# Patient Record
Sex: FEMALE | Race: BLACK OR AFRICAN AMERICAN | NOT HISPANIC OR LATINO | Employment: FULL TIME | URBAN - METROPOLITAN AREA
[De-identification: names, ages, dates, MRNs, and addresses within clinical notes are randomized per-mention and may not be internally consistent; named-entity substitution may affect disease eponyms.]

---

## 2024-03-03 ENCOUNTER — APPOINTMENT (OUTPATIENT)
Dept: RADIOLOGY | Facility: HOSPITAL | Age: 34
End: 2024-03-03
Payer: MEDICAID

## 2024-03-03 ENCOUNTER — HOSPITAL ENCOUNTER (INPATIENT)
Facility: HOSPITAL | Age: 34
LOS: 7 days | Discharge: HOME | End: 2024-03-11
Attending: EMERGENCY MEDICINE | Admitting: STUDENT IN AN ORGANIZED HEALTH CARE EDUCATION/TRAINING PROGRAM
Payer: MEDICAID

## 2024-03-03 DIAGNOSIS — K62.89 PROCTITIS: ICD-10-CM

## 2024-03-03 DIAGNOSIS — K44.0 HIATAL HERNIA WITH OBSTRUCTION BUT NO GANGRENE: Primary | ICD-10-CM

## 2024-03-03 LAB
ALBUMIN SERPL BCP-MCNC: 4.8 G/DL (ref 3.4–5)
ALP SERPL-CCNC: 66 U/L (ref 33–110)
ALT SERPL W P-5'-P-CCNC: 16 U/L (ref 7–45)
ANION GAP BLDV CALCULATED.4IONS-SCNC: 16 MMOL/L (ref 10–25)
ANION GAP SERPL CALC-SCNC: 22 MMOL/L (ref 10–20)
AST SERPL W P-5'-P-CCNC: 28 U/L (ref 9–39)
BASE EXCESS BLDV CALC-SCNC: -5.6 MMOL/L (ref -2–3)
BASOPHILS # BLD AUTO: 0.02 X10*3/UL (ref 0–0.1)
BASOPHILS NFR BLD AUTO: 0.2 %
BILIRUB SERPL-MCNC: 0.4 MG/DL (ref 0–1.2)
BODY TEMPERATURE: 37 DEGREES CELSIUS
BUN SERPL-MCNC: 8 MG/DL (ref 6–23)
CA-I BLDV-SCNC: 1.17 MMOL/L (ref 1.1–1.33)
CALCIUM SERPL-MCNC: 9.7 MG/DL (ref 8.6–10.6)
CHLORIDE BLDV-SCNC: 104 MMOL/L (ref 98–107)
CHLORIDE SERPL-SCNC: 103 MMOL/L (ref 98–107)
CO2 SERPL-SCNC: 16 MMOL/L (ref 21–32)
CREAT SERPL-MCNC: 0.75 MG/DL (ref 0.5–1.05)
EGFRCR SERPLBLD CKD-EPI 2021: >90 ML/MIN/1.73M*2
EOSINOPHIL # BLD AUTO: 0 X10*3/UL (ref 0–0.7)
EOSINOPHIL NFR BLD AUTO: 0 %
ERYTHROCYTE [DISTWIDTH] IN BLOOD BY AUTOMATED COUNT: 14.5 % (ref 11.5–14.5)
GLUCOSE BLDV-MCNC: 153 MG/DL (ref 74–99)
GLUCOSE SERPL-MCNC: 140 MG/DL (ref 74–99)
HCO3 BLDV-SCNC: 19.3 MMOL/L (ref 22–26)
HCT VFR BLD AUTO: 37.8 % (ref 36–46)
HCT VFR BLD EST: 39 % (ref 36–46)
HGB BLD-MCNC: 12.5 G/DL (ref 12–16)
HGB BLDV-MCNC: 13 G/DL (ref 12–16)
IMM GRANULOCYTES # BLD AUTO: 0.04 X10*3/UL (ref 0–0.7)
IMM GRANULOCYTES NFR BLD AUTO: 0.4 % (ref 0–0.9)
LACTATE BLDV-SCNC: 3.8 MMOL/L (ref 0.4–2)
LIPASE SERPL-CCNC: 58 U/L (ref 9–82)
LYMPHOCYTES # BLD AUTO: 0.88 X10*3/UL (ref 1.2–4.8)
LYMPHOCYTES NFR BLD AUTO: 8.1 %
MCH RBC QN AUTO: 27.1 PG (ref 26–34)
MCHC RBC AUTO-ENTMCNC: 33.1 G/DL (ref 32–36)
MCV RBC AUTO: 82 FL (ref 80–100)
MONOCYTES # BLD AUTO: 0.31 X10*3/UL (ref 0.1–1)
MONOCYTES NFR BLD AUTO: 2.8 %
NEUTROPHILS # BLD AUTO: 9.67 X10*3/UL (ref 1.2–7.7)
NEUTROPHILS NFR BLD AUTO: 88.5 %
NRBC BLD-RTO: 0 /100 WBCS (ref 0–0)
OXYHGB MFR BLDV: 74 % (ref 45–75)
PCO2 BLDV: 35 MM HG (ref 41–51)
PH BLDV: 7.35 PH (ref 7.33–7.43)
PLATELET # BLD AUTO: 310 X10*3/UL (ref 150–450)
PO2 BLDV: 49 MM HG (ref 35–45)
POTASSIUM BLDV-SCNC: 3.4 MMOL/L (ref 3.5–5.3)
POTASSIUM SERPL-SCNC: 3.6 MMOL/L (ref 3.5–5.3)
PROT SERPL-MCNC: 8.5 G/DL (ref 6.4–8.2)
RBC # BLD AUTO: 4.62 X10*6/UL (ref 4–5.2)
SAO2 % BLDV: 75 % (ref 45–75)
SODIUM BLDV-SCNC: 136 MMOL/L (ref 136–145)
SODIUM SERPL-SCNC: 137 MMOL/L (ref 136–145)
WBC # BLD AUTO: 10.9 X10*3/UL (ref 4.4–11.3)

## 2024-03-03 PROCEDURE — 74177 CT ABD & PELVIS W/CONTRAST: CPT

## 2024-03-03 PROCEDURE — 84132 ASSAY OF SERUM POTASSIUM: CPT

## 2024-03-03 PROCEDURE — 36415 COLL VENOUS BLD VENIPUNCTURE: CPT

## 2024-03-03 PROCEDURE — 99285 EMERGENCY DEPT VISIT HI MDM: CPT | Performed by: EMERGENCY MEDICINE

## 2024-03-03 PROCEDURE — 74022 RADEX COMPL AQT ABD SERIES: CPT

## 2024-03-03 PROCEDURE — 71260 CT THORAX DX C+: CPT | Mod: FOREIGN READ | Performed by: RADIOLOGY

## 2024-03-03 PROCEDURE — 96375 TX/PRO/DX INJ NEW DRUG ADDON: CPT

## 2024-03-03 PROCEDURE — 83690 ASSAY OF LIPASE: CPT

## 2024-03-03 PROCEDURE — 74177 CT ABD & PELVIS W/CONTRAST: CPT | Mod: FOREIGN READ | Performed by: RADIOLOGY

## 2024-03-03 PROCEDURE — 96374 THER/PROPH/DIAG INJ IV PUSH: CPT

## 2024-03-03 PROCEDURE — 93010 ELECTROCARDIOGRAM REPORT: CPT

## 2024-03-03 PROCEDURE — 96361 HYDRATE IV INFUSION ADD-ON: CPT

## 2024-03-03 PROCEDURE — 99285 EMERGENCY DEPT VISIT HI MDM: CPT | Mod: 25

## 2024-03-03 PROCEDURE — 96376 TX/PRO/DX INJ SAME DRUG ADON: CPT

## 2024-03-03 PROCEDURE — 2500000004 HC RX 250 GENERAL PHARMACY W/ HCPCS (ALT 636 FOR OP/ED): Mod: SE

## 2024-03-03 PROCEDURE — 85025 COMPLETE CBC W/AUTO DIFF WBC: CPT

## 2024-03-03 PROCEDURE — 74022 RADEX COMPL AQT ABD SERIES: CPT | Mod: FOREIGN READ | Performed by: RADIOLOGY

## 2024-03-03 PROCEDURE — 80053 COMPREHEN METABOLIC PANEL: CPT

## 2024-03-03 PROCEDURE — 2550000001 HC RX 255 CONTRASTS: Mod: SE | Performed by: EMERGENCY MEDICINE

## 2024-03-03 RX ORDER — ONDANSETRON HYDROCHLORIDE 2 MG/ML
4 INJECTION, SOLUTION INTRAVENOUS ONCE
Status: COMPLETED | OUTPATIENT
Start: 2024-03-03 | End: 2024-03-03

## 2024-03-03 RX ORDER — HYDROMORPHONE HYDROCHLORIDE 1 MG/ML
1 INJECTION, SOLUTION INTRAMUSCULAR; INTRAVENOUS; SUBCUTANEOUS ONCE
Status: COMPLETED | OUTPATIENT
Start: 2024-03-03 | End: 2024-03-03

## 2024-03-03 RX ORDER — MORPHINE SULFATE 4 MG/ML
2 INJECTION INTRAVENOUS ONCE
Status: COMPLETED | OUTPATIENT
Start: 2024-03-03 | End: 2024-03-03

## 2024-03-03 RX ADMIN — HYDROMORPHONE HYDROCHLORIDE 1 MG: 1 INJECTION, SOLUTION INTRAMUSCULAR; INTRAVENOUS; SUBCUTANEOUS at 21:20

## 2024-03-03 RX ADMIN — MORPHINE SULFATE 2 MG: 4 INJECTION, SOLUTION INTRAMUSCULAR; INTRAVENOUS at 19:02

## 2024-03-03 RX ADMIN — IOHEXOL 75 ML: 350 INJECTION, SOLUTION INTRAVENOUS at 22:19

## 2024-03-03 RX ADMIN — MORPHINE SULFATE 2 MG: 4 INJECTION, SOLUTION INTRAMUSCULAR; INTRAVENOUS at 20:14

## 2024-03-03 RX ADMIN — ONDANSETRON 4 MG: 2 INJECTION INTRAMUSCULAR; INTRAVENOUS at 19:02

## 2024-03-03 RX ADMIN — HYDROMORPHONE HYDROCHLORIDE 1 MG: 1 INJECTION, SOLUTION INTRAMUSCULAR; INTRAVENOUS; SUBCUTANEOUS at 22:50

## 2024-03-03 RX ADMIN — SODIUM CHLORIDE, POTASSIUM CHLORIDE, SODIUM LACTATE AND CALCIUM CHLORIDE 1000 ML: 600; 310; 30; 20 INJECTION, SOLUTION INTRAVENOUS at 18:56

## 2024-03-03 RX ADMIN — ONDANSETRON 4 MG: 2 INJECTION INTRAMUSCULAR; INTRAVENOUS at 21:20

## 2024-03-03 ASSESSMENT — COLUMBIA-SUICIDE SEVERITY RATING SCALE - C-SSRS
5. HAVE YOU STARTED TO WORK OUT OR WORKED OUT THE DETAILS OF HOW TO KILL YOURSELF? DO YOU INTEND TO CARRY OUT THIS PLAN?: NO
2. HAVE YOU ACTUALLY HAD ANY THOUGHTS OF KILLING YOURSELF?: YES
6. HAVE YOU EVER DONE ANYTHING, STARTED TO DO ANYTHING, OR PREPARED TO DO ANYTHING TO END YOUR LIFE?: NO
1. IN THE PAST MONTH, HAVE YOU WISHED YOU WERE DEAD OR WISHED YOU COULD GO TO SLEEP AND NOT WAKE UP?: NO
4. HAVE YOU HAD THESE THOUGHTS AND HAD SOME INTENTION OF ACTING ON THEM?: NO
6. HAVE YOU EVER DONE ANYTHING, STARTED TO DO ANYTHING, OR PREPARED TO DO ANYTHING TO END YOUR LIFE?: YES

## 2024-03-03 ASSESSMENT — PAIN SCALES - GENERAL
PAINLEVEL_OUTOF10: 10 - WORST POSSIBLE PAIN
PAINLEVEL_OUTOF10: 10 - WORST POSSIBLE PAIN

## 2024-03-03 ASSESSMENT — PAIN - FUNCTIONAL ASSESSMENT
PAIN_FUNCTIONAL_ASSESSMENT: 0-10
PAIN_FUNCTIONAL_ASSESSMENT: 0-10

## 2024-03-03 NOTE — PROGRESS NOTES
I received Jewel Rodriguez in signout from Dr. Mendes.  Please see the previous note for all HPI, PE and MDM up to the time of signout at 1900.  This is in addition to the primary documentation.    In brief Jewel Rodriguez is an 34 y.o. female presenting for abdominal pain.  The patient's symptoms are most consistent with small bowel obstruction which has been a concern in the past.  The patient does have a previous medical history of achalasia with an esophagectomy completed with Dr. Sexton in 2017.  At the time of signout we are awaiting labs, imaging and final disposition.    ED Course as of 03/04/24 0528   Sun Mar 03, 2024   1900 Blood Gas Venous Full Panel Unsolicited(!)  VBG reviewed showing lactic acidosis of 3.8 with no other significant abnormalities.  Repeat lactic ordered and is pending [RS]   2000 Patient with increasing pain.  2 mg morphine ordered [RS]   2055 Lipase  Lipase within normal limits [RS]   2055 CBC and Auto Differential(!)  CBC with no significant abnormalities [RS]   2055 Comprehensive metabolic panel(!)  CMP with bicarb of 16, no other significant abnormalities. [RS]   2056 XR abdomen 2 views w chest 1 view  Acute abdomen series personally interpreted by me showing significantly elevated left hemidiaphragm with large gastric bubble.  No other significant abnormalities appreciated. [RS]   2233 CT chest abdomen pelvis w IV contrast  CT chest abdomen and pelvis personally interpreted by me showing concern for possible diaphragmatic disruption with dilated small bowel loops with air-fluid level present in the left chest.  Compressive atelectasis of the left lower lobe with no obvious pneumonia. [RS]   2327 CT chest abdomen pelvis w IV contrast  Received a call from the radiologist with the CT showing high-grade small bowel obstruction secondary to large hiatal hernia.  Also evidence of rectal wall thickening consistent with proctitis. [RS]   2354 Thoracic surgery consulted and will come  evaluate the patient here in the emergency department. [RS]      ED Course User Index  [RS] Jameel Travis DO         Diagnoses as of 03/04/24 0528   Hiatal hernia with obstruction but no gangrene   Proctitis        MDM:  Jewel Rodriguez is a 34-year-old female with past medical history of achalasia s/p esophagectomy in 2017 that presents to the emergency department today with abdominal pain.  Symptoms are most consistent with small bowel obstruction.  The patient is in mild distress but nontoxic-appearing on exam.  Mild suprapubic tenderness to palpation otherwise no significant findings.  The patient was treated with multiple doses of IV pain medications and antiemetics throughout the ED course.  Labs, acute abdominal series x-rays and CT chest/abdomen/pelvis were ordered, see ED course above for personal interpretation of this.  X-ray series showed dilated bowel overlying the left lower chest and follow-up CT scan showed high-grade bowel obstruction secondary to large hiatal hernia as well as evidence of proctitis.  Thoracic surgery was called and planned to take the patient to the operating room.  Preop labs and EKG were obtained.  Thoracic surgery also recommended consulting ACS as there is possibility that the patient requires bowel resection.  This plan was discussed with the patient and she was agreeable with this and taken to the OR in stable condition.    Assessment and plan discussed with Dr. Arturo Ro and Grady Joyner    Pt Disposition: OR    Jameel Travis DO   Emergency Medicine, PGY-1     Procedures

## 2024-03-03 NOTE — ED TRIAGE NOTES
Pt presents to ED with chief complaint of abdominal pain, N/V. Pt has a Hx of akalashia and had surgery for this aprox 7 years ago. Pt hasn't had a bowel movement in 1 week, pt is actively vomiting upon arrival. Pt is A&O x3/4, daughter at the bedside.

## 2024-03-04 ENCOUNTER — PREP FOR PROCEDURE (OUTPATIENT)
Dept: SURGERY | Facility: HOSPITAL | Age: 34
End: 2024-03-04

## 2024-03-04 ENCOUNTER — HOSPITAL ENCOUNTER (OUTPATIENT)
Facility: HOSPITAL | Age: 34
Setting detail: SURGERY ADMIT
End: 2024-03-04
Attending: STUDENT IN AN ORGANIZED HEALTH CARE EDUCATION/TRAINING PROGRAM | Admitting: STUDENT IN AN ORGANIZED HEALTH CARE EDUCATION/TRAINING PROGRAM
Payer: MEDICAID

## 2024-03-04 ENCOUNTER — ANESTHESIA (OUTPATIENT)
Dept: OPERATING ROOM | Facility: HOSPITAL | Age: 34
End: 2024-03-04
Payer: MEDICAID

## 2024-03-04 ENCOUNTER — ANESTHESIA EVENT (OUTPATIENT)
Dept: OPERATING ROOM | Facility: HOSPITAL | Age: 34
End: 2024-03-04
Payer: MEDICAID

## 2024-03-04 ENCOUNTER — APPOINTMENT (OUTPATIENT)
Dept: RADIOLOGY | Facility: HOSPITAL | Age: 34
End: 2024-03-04
Payer: MEDICAID

## 2024-03-04 ENCOUNTER — CLINICAL SUPPORT (OUTPATIENT)
Dept: EMERGENCY MEDICINE | Facility: HOSPITAL | Age: 34
End: 2024-03-04
Payer: MEDICAID

## 2024-03-04 PROBLEM — K44.0 HIATAL HERNIA WITH OBSTRUCTION BUT NO GANGRENE: Status: ACTIVE | Noted: 2024-03-03

## 2024-03-04 LAB
ABO GROUP (TYPE) IN BLOOD: NORMAL
ANION GAP SERPL CALC-SCNC: 14 MMOL/L (ref 10–20)
ANTIBODY SCREEN: NORMAL
BUN SERPL-MCNC: 9 MG/DL (ref 6–23)
CALCIUM SERPL-MCNC: 8.5 MG/DL (ref 8.6–10.6)
CHLORIDE SERPL-SCNC: 103 MMOL/L (ref 98–107)
CO2 SERPL-SCNC: 24 MMOL/L (ref 21–32)
CREAT SERPL-MCNC: 0.66 MG/DL (ref 0.5–1.05)
EGFRCR SERPLBLD CKD-EPI 2021: >90 ML/MIN/1.73M*2
ERYTHROCYTE [DISTWIDTH] IN BLOOD BY AUTOMATED COUNT: 14.8 % (ref 11.5–14.5)
GLUCOSE SERPL-MCNC: 170 MG/DL (ref 74–99)
HCT VFR BLD AUTO: 37.9 % (ref 36–46)
HGB BLD-MCNC: 12.4 G/DL (ref 12–16)
MAGNESIUM SERPL-MCNC: 1.86 MG/DL (ref 1.6–2.4)
MCH RBC QN AUTO: 27.4 PG (ref 26–34)
MCHC RBC AUTO-ENTMCNC: 32.7 G/DL (ref 32–36)
MCV RBC AUTO: 84 FL (ref 80–100)
NRBC BLD-RTO: 0 /100 WBCS (ref 0–0)
PLATELET # BLD AUTO: 209 X10*3/UL (ref 150–450)
POTASSIUM SERPL-SCNC: 4.4 MMOL/L (ref 3.5–5.3)
RBC # BLD AUTO: 4.52 X10*6/UL (ref 4–5.2)
RH FACTOR (ANTIGEN D): NORMAL
SODIUM SERPL-SCNC: 137 MMOL/L (ref 136–145)
WBC # BLD AUTO: 19.8 X10*3/UL (ref 4.4–11.3)

## 2024-03-04 PROCEDURE — 43235 EGD DIAGNOSTIC BRUSH WASH: CPT | Performed by: STUDENT IN AN ORGANIZED HEALTH CARE EDUCATION/TRAINING PROGRAM

## 2024-03-04 PROCEDURE — 88302 TISSUE EXAM BY PATHOLOGIST: CPT | Performed by: PATHOLOGY

## 2024-03-04 PROCEDURE — 2500000005 HC RX 250 GENERAL PHARMACY W/O HCPCS: Mod: SE | Performed by: STUDENT IN AN ORGANIZED HEALTH CARE EDUCATION/TRAINING PROGRAM

## 2024-03-04 PROCEDURE — 36415 COLL VENOUS BLD VENIPUNCTURE: CPT | Performed by: STUDENT IN AN ORGANIZED HEALTH CARE EDUCATION/TRAINING PROGRAM

## 2024-03-04 PROCEDURE — 99221 1ST HOSP IP/OBS SF/LOW 40: CPT | Performed by: SURGERY

## 2024-03-04 PROCEDURE — 2780000003 HC OR 278 NO HCPCS: Performed by: STUDENT IN AN ORGANIZED HEALTH CARE EDUCATION/TRAINING PROGRAM

## 2024-03-04 PROCEDURE — A4217 STERILE WATER/SALINE, 500 ML: HCPCS | Mod: SE | Performed by: STUDENT IN AN ORGANIZED HEALTH CARE EDUCATION/TRAINING PROGRAM

## 2024-03-04 PROCEDURE — 2500000004 HC RX 250 GENERAL PHARMACY W/ HCPCS (ALT 636 FOR OP/ED): Mod: SE | Performed by: STUDENT IN AN ORGANIZED HEALTH CARE EDUCATION/TRAINING PROGRAM

## 2024-03-04 PROCEDURE — 0751T DGTZ GLS MCRSCP SLD LEVEL II: CPT | Mod: TC,SUR | Performed by: STUDENT IN AN ORGANIZED HEALTH CARE EDUCATION/TRAINING PROGRAM

## 2024-03-04 PROCEDURE — 2500000004 HC RX 250 GENERAL PHARMACY W/ HCPCS (ALT 636 FOR OP/ED): Performed by: STUDENT IN AN ORGANIZED HEALTH CARE EDUCATION/TRAINING PROGRAM

## 2024-03-04 PROCEDURE — C9113 INJ PANTOPRAZOLE SODIUM, VIA: HCPCS | Performed by: NURSE PRACTITIONER

## 2024-03-04 PROCEDURE — A43281 PR LAP, REPAIR PARAESOPHAGEAL HERNIA, INCL FUNDOPLASTY W/O MESH: Performed by: ANESTHESIOLOGY

## 2024-03-04 PROCEDURE — 81001 URINALYSIS AUTO W/SCOPE: CPT

## 2024-03-04 PROCEDURE — 86901 BLOOD TYPING SEROLOGIC RH(D): CPT

## 2024-03-04 PROCEDURE — 99140 ANES COMP EMERGENCY COND: CPT | Performed by: ANESTHESIOLOGY

## 2024-03-04 PROCEDURE — 3700000001 HC GENERAL ANESTHESIA TIME - INITIAL BASE CHARGE: Performed by: STUDENT IN AN ORGANIZED HEALTH CARE EDUCATION/TRAINING PROGRAM

## 2024-03-04 PROCEDURE — 3600000009 HC OR TIME - EACH INCREMENTAL 1 MINUTE - PROCEDURE LEVEL FOUR: Performed by: STUDENT IN AN ORGANIZED HEALTH CARE EDUCATION/TRAINING PROGRAM

## 2024-03-04 PROCEDURE — 3600000004 HC OR TIME - INITIAL BASE CHARGE - PROCEDURE LEVEL FOUR: Performed by: STUDENT IN AN ORGANIZED HEALTH CARE EDUCATION/TRAINING PROGRAM

## 2024-03-04 PROCEDURE — 85027 COMPLETE CBC AUTOMATED: CPT | Performed by: STUDENT IN AN ORGANIZED HEALTH CARE EDUCATION/TRAINING PROGRAM

## 2024-03-04 PROCEDURE — 1200000002 HC GENERAL ROOM WITH TELEMETRY DAILY

## 2024-03-04 PROCEDURE — 83735 ASSAY OF MAGNESIUM: CPT | Performed by: STUDENT IN AN ORGANIZED HEALTH CARE EDUCATION/TRAINING PROGRAM

## 2024-03-04 PROCEDURE — 3700000002 HC GENERAL ANESTHESIA TIME - EACH INCREMENTAL 1 MINUTE: Performed by: STUDENT IN AN ORGANIZED HEALTH CARE EDUCATION/TRAINING PROGRAM

## 2024-03-04 PROCEDURE — 7100000002 HC RECOVERY ROOM TIME - EACH INCREMENTAL 1 MINUTE: Performed by: STUDENT IN AN ORGANIZED HEALTH CARE EDUCATION/TRAINING PROGRAM

## 2024-03-04 PROCEDURE — 2720000007 HC OR 272 NO HCPCS: Performed by: STUDENT IN AN ORGANIZED HEALTH CARE EDUCATION/TRAINING PROGRAM

## 2024-03-04 PROCEDURE — 43332 TRANSAB ESOPH HIAT HERN RPR: CPT | Performed by: STUDENT IN AN ORGANIZED HEALTH CARE EDUCATION/TRAINING PROGRAM

## 2024-03-04 PROCEDURE — 99222 1ST HOSP IP/OBS MODERATE 55: CPT | Performed by: NURSE PRACTITIONER

## 2024-03-04 PROCEDURE — 51702 INSERT TEMP BLADDER CATH: CPT

## 2024-03-04 PROCEDURE — 7100000001 HC RECOVERY ROOM TIME - INITIAL BASE CHARGE: Performed by: STUDENT IN AN ORGANIZED HEALTH CARE EDUCATION/TRAINING PROGRAM

## 2024-03-04 PROCEDURE — 71045 X-RAY EXAM CHEST 1 VIEW: CPT | Performed by: RADIOLOGY

## 2024-03-04 PROCEDURE — 2500000004 HC RX 250 GENERAL PHARMACY W/ HCPCS (ALT 636 FOR OP/ED): Performed by: NURSE PRACTITIONER

## 2024-03-04 PROCEDURE — C1768 GRAFT, VASCULAR: HCPCS | Performed by: STUDENT IN AN ORGANIZED HEALTH CARE EDUCATION/TRAINING PROGRAM

## 2024-03-04 PROCEDURE — 2500000004 HC RX 250 GENERAL PHARMACY W/ HCPCS (ALT 636 FOR OP/ED): Mod: SE

## 2024-03-04 PROCEDURE — P9045 ALBUMIN (HUMAN), 5%, 250 ML: HCPCS | Mod: JZ,SE | Performed by: STUDENT IN AN ORGANIZED HEALTH CARE EDUCATION/TRAINING PROGRAM

## 2024-03-04 PROCEDURE — 80048 BASIC METABOLIC PNL TOTAL CA: CPT | Performed by: STUDENT IN AN ORGANIZED HEALTH CARE EDUCATION/TRAINING PROGRAM

## 2024-03-04 PROCEDURE — 71045 X-RAY EXAM CHEST 1 VIEW: CPT

## 2024-03-04 PROCEDURE — 93005 ELECTROCARDIOGRAM TRACING: CPT

## 2024-03-04 DEVICE — PATCH, FELT, 1 X 6 IN, EPTFE: Type: IMPLANTABLE DEVICE | Site: DIAPHRAGM | Status: FUNCTIONAL

## 2024-03-04 RX ORDER — MAGNESIUM SULFATE HEPTAHYDRATE 40 MG/ML
2 INJECTION, SOLUTION INTRAVENOUS ONCE
Status: COMPLETED | OUTPATIENT
Start: 2024-03-04 | End: 2024-03-04

## 2024-03-04 RX ORDER — PANTOPRAZOLE SODIUM 40 MG/1
40 TABLET, DELAYED RELEASE ORAL
Status: DISCONTINUED | OUTPATIENT
Start: 2024-03-04 | End: 2024-03-04

## 2024-03-04 RX ORDER — KETOROLAC TROMETHAMINE 15 MG/ML
15 INJECTION, SOLUTION INTRAMUSCULAR; INTRAVENOUS EVERY 6 HOURS PRN
Status: DISCONTINUED | OUTPATIENT
Start: 2024-03-04 | End: 2024-03-05

## 2024-03-04 RX ORDER — PHENYLEPHRINE HCL IN 0.9% NACL 0.4MG/10ML
SYRINGE (ML) INTRAVENOUS AS NEEDED
Status: DISCONTINUED | OUTPATIENT
Start: 2024-03-04 | End: 2024-03-04

## 2024-03-04 RX ORDER — ONDANSETRON HYDROCHLORIDE 2 MG/ML
4 INJECTION, SOLUTION INTRAVENOUS EVERY 8 HOURS PRN
Status: DISCONTINUED | OUTPATIENT
Start: 2024-03-04 | End: 2024-03-11 | Stop reason: HOSPADM

## 2024-03-04 RX ORDER — HYDROMORPHONE HYDROCHLORIDE 1 MG/ML
0.2 INJECTION, SOLUTION INTRAMUSCULAR; INTRAVENOUS; SUBCUTANEOUS EVERY 5 MIN PRN
Status: DISCONTINUED | OUTPATIENT
Start: 2024-03-04 | End: 2024-03-04 | Stop reason: HOSPADM

## 2024-03-04 RX ORDER — PANTOPRAZOLE SODIUM 40 MG/10ML
40 INJECTION, POWDER, LYOPHILIZED, FOR SOLUTION INTRAVENOUS DAILY
Status: DISCONTINUED | OUTPATIENT
Start: 2024-03-04 | End: 2024-03-11 | Stop reason: HOSPADM

## 2024-03-04 RX ORDER — ACETAMINOPHEN 10 MG/ML
1000 INJECTION, SOLUTION INTRAVENOUS EVERY 6 HOURS SCHEDULED
Status: DISCONTINUED | OUTPATIENT
Start: 2024-03-04 | End: 2024-03-04

## 2024-03-04 RX ORDER — HYDROMORPHONE HYDROCHLORIDE 1 MG/ML
0.5 INJECTION, SOLUTION INTRAMUSCULAR; INTRAVENOUS; SUBCUTANEOUS EVERY 5 MIN PRN
Status: DISCONTINUED | OUTPATIENT
Start: 2024-03-04 | End: 2024-03-04 | Stop reason: HOSPADM

## 2024-03-04 RX ORDER — HYDROMORPHONE HYDROCHLORIDE 1 MG/ML
1 INJECTION, SOLUTION INTRAMUSCULAR; INTRAVENOUS; SUBCUTANEOUS ONCE
Status: COMPLETED | OUTPATIENT
Start: 2024-03-04 | End: 2024-03-04

## 2024-03-04 RX ORDER — SODIUM CHLORIDE 0.9 G/100ML
IRRIGANT IRRIGATION AS NEEDED
Status: DISCONTINUED | OUTPATIENT
Start: 2024-03-04 | End: 2024-03-04 | Stop reason: HOSPADM

## 2024-03-04 RX ORDER — ONDANSETRON HYDROCHLORIDE 2 MG/ML
INJECTION, SOLUTION INTRAVENOUS AS NEEDED
Status: DISCONTINUED | OUTPATIENT
Start: 2024-03-04 | End: 2024-03-04

## 2024-03-04 RX ORDER — NALOXONE HYDROCHLORIDE 0.4 MG/ML
0.2 INJECTION, SOLUTION INTRAMUSCULAR; INTRAVENOUS; SUBCUTANEOUS AS NEEDED
Status: DISCONTINUED | OUTPATIENT
Start: 2024-03-04 | End: 2024-03-10

## 2024-03-04 RX ORDER — METRONIDAZOLE 500 MG/100ML
INJECTION, SOLUTION INTRAVENOUS AS NEEDED
Status: DISCONTINUED | OUTPATIENT
Start: 2024-03-04 | End: 2024-03-04

## 2024-03-04 RX ORDER — CEFAZOLIN 1 G/1
INJECTION, POWDER, FOR SOLUTION INTRAVENOUS AS NEEDED
Status: DISCONTINUED | OUTPATIENT
Start: 2024-03-04 | End: 2024-03-04

## 2024-03-04 RX ORDER — PROPOFOL 10 MG/ML
INJECTION, EMULSION INTRAVENOUS AS NEEDED
Status: DISCONTINUED | OUTPATIENT
Start: 2024-03-04 | End: 2024-03-04

## 2024-03-04 RX ORDER — FENTANYL CITRATE 50 UG/ML
INJECTION, SOLUTION INTRAMUSCULAR; INTRAVENOUS AS NEEDED
Status: DISCONTINUED | OUTPATIENT
Start: 2024-03-04 | End: 2024-03-04

## 2024-03-04 RX ORDER — ALBUMIN HUMAN 50 G/1000ML
SOLUTION INTRAVENOUS AS NEEDED
Status: DISCONTINUED | OUTPATIENT
Start: 2024-03-04 | End: 2024-03-04

## 2024-03-04 RX ORDER — ROCURONIUM BROMIDE 10 MG/ML
INJECTION, SOLUTION INTRAVENOUS AS NEEDED
Status: DISCONTINUED | OUTPATIENT
Start: 2024-03-04 | End: 2024-03-04

## 2024-03-04 RX ORDER — DROPERIDOL 2.5 MG/ML
0.62 INJECTION, SOLUTION INTRAMUSCULAR; INTRAVENOUS ONCE AS NEEDED
Status: DISCONTINUED | OUTPATIENT
Start: 2024-03-04 | End: 2024-03-04 | Stop reason: HOSPADM

## 2024-03-04 RX ORDER — ALBUTEROL SULFATE 0.83 MG/ML
2.5 SOLUTION RESPIRATORY (INHALATION) EVERY 6 HOURS PRN
Status: DISCONTINUED | OUTPATIENT
Start: 2024-03-04 | End: 2024-03-11 | Stop reason: HOSPADM

## 2024-03-04 RX ORDER — LIDOCAINE HYDROCHLORIDE 10 MG/ML
0.1 INJECTION INFILTRATION; PERINEURAL ONCE
Status: DISCONTINUED | OUTPATIENT
Start: 2024-03-04 | End: 2024-03-04 | Stop reason: HOSPADM

## 2024-03-04 RX ORDER — HEPARIN SODIUM 5000 [USP'U]/ML
5000 INJECTION, SOLUTION INTRAVENOUS; SUBCUTANEOUS EVERY 8 HOURS
Status: DISCONTINUED | OUTPATIENT
Start: 2024-03-04 | End: 2024-03-11 | Stop reason: HOSPADM

## 2024-03-04 RX ORDER — SUCCINYLCHOLINE CHLORIDE 100 MG/5ML
SYRINGE (ML) INTRAVENOUS AS NEEDED
Status: DISCONTINUED | OUTPATIENT
Start: 2024-03-04 | End: 2024-03-04

## 2024-03-04 RX ORDER — ONDANSETRON 4 MG/1
4 TABLET, ORALLY DISINTEGRATING ORAL EVERY 8 HOURS PRN
Status: DISCONTINUED | OUTPATIENT
Start: 2024-03-04 | End: 2024-03-11 | Stop reason: HOSPADM

## 2024-03-04 RX ORDER — SODIUM CHLORIDE, SODIUM LACTATE, POTASSIUM CHLORIDE, CALCIUM CHLORIDE 600; 310; 30; 20 MG/100ML; MG/100ML; MG/100ML; MG/100ML
INJECTION, SOLUTION INTRAVENOUS CONTINUOUS PRN
Status: DISCONTINUED | OUTPATIENT
Start: 2024-03-04 | End: 2024-03-04

## 2024-03-04 RX ORDER — DEXAMETHASONE SODIUM PHOSPHATE 4 MG/ML
INJECTION, SOLUTION INTRA-ARTICULAR; INTRALESIONAL; INTRAMUSCULAR; INTRAVENOUS; SOFT TISSUE AS NEEDED
Status: DISCONTINUED | OUTPATIENT
Start: 2024-03-04 | End: 2024-03-04

## 2024-03-04 RX ORDER — NALOXONE HYDROCHLORIDE 0.4 MG/ML
0.2 INJECTION, SOLUTION INTRAMUSCULAR; INTRAVENOUS; SUBCUTANEOUS EVERY 5 MIN PRN
Status: DISCONTINUED | OUTPATIENT
Start: 2024-03-04 | End: 2024-03-11 | Stop reason: HOSPADM

## 2024-03-04 RX ORDER — SODIUM CHLORIDE, SODIUM LACTATE, POTASSIUM CHLORIDE, CALCIUM CHLORIDE 600; 310; 30; 20 MG/100ML; MG/100ML; MG/100ML; MG/100ML
100 INJECTION, SOLUTION INTRAVENOUS CONTINUOUS
Status: DISCONTINUED | OUTPATIENT
Start: 2024-03-04 | End: 2024-03-04 | Stop reason: HOSPADM

## 2024-03-04 RX ORDER — HYDROMORPHONE HYDROCHLORIDE 1 MG/ML
INJECTION, SOLUTION INTRAMUSCULAR; INTRAVENOUS; SUBCUTANEOUS CONTINUOUS PRN
Status: DISCONTINUED | OUTPATIENT
Start: 2024-03-04 | End: 2024-03-04

## 2024-03-04 RX ORDER — HEPARIN SODIUM 5000 [USP'U]/ML
INJECTION, SOLUTION INTRAVENOUS; SUBCUTANEOUS AS NEEDED
Status: DISCONTINUED | OUTPATIENT
Start: 2024-03-04 | End: 2024-03-04

## 2024-03-04 RX ORDER — SODIUM CHLORIDE, SODIUM LACTATE, POTASSIUM CHLORIDE, CALCIUM CHLORIDE 600; 310; 30; 20 MG/100ML; MG/100ML; MG/100ML; MG/100ML
100 INJECTION, SOLUTION INTRAVENOUS CONTINUOUS
Status: DISCONTINUED | OUTPATIENT
Start: 2024-03-04 | End: 2024-03-05

## 2024-03-04 RX ORDER — LIDOCAINE HYDROCHLORIDE 20 MG/ML
INJECTION, SOLUTION INFILTRATION; PERINEURAL AS NEEDED
Status: DISCONTINUED | OUTPATIENT
Start: 2024-03-04 | End: 2024-03-04

## 2024-03-04 RX ORDER — ACETAMINOPHEN 10 MG/ML
1000 INJECTION, SOLUTION INTRAVENOUS EVERY 6 HOURS SCHEDULED
Status: DISCONTINUED | OUTPATIENT
Start: 2024-03-04 | End: 2024-03-05

## 2024-03-04 RX ORDER — OXYCODONE HYDROCHLORIDE 5 MG/1
5 TABLET ORAL EVERY 4 HOURS PRN
Status: DISCONTINUED | OUTPATIENT
Start: 2024-03-04 | End: 2024-03-04 | Stop reason: HOSPADM

## 2024-03-04 RX ORDER — ESMOLOL HYDROCHLORIDE 10 MG/ML
INJECTION INTRAVENOUS AS NEEDED
Status: DISCONTINUED | OUTPATIENT
Start: 2024-03-04 | End: 2024-03-04

## 2024-03-04 RX ORDER — MIDAZOLAM HYDROCHLORIDE 1 MG/ML
INJECTION, SOLUTION INTRAMUSCULAR; INTRAVENOUS AS NEEDED
Status: DISCONTINUED | OUTPATIENT
Start: 2024-03-04 | End: 2024-03-04

## 2024-03-04 RX ORDER — HYDROMORPHONE HCL/0.9% NACL/PF 15 MG/30ML
PATIENT CONTROLLED ANALGESIA SYRINGE INTRAVENOUS CONTINUOUS
Status: DISCONTINUED | OUTPATIENT
Start: 2024-03-04 | End: 2024-03-10

## 2024-03-04 RX ADMIN — DEXAMETHASONE SODIUM PHOSPHATE 10 MG: 4 INJECTION, SOLUTION INTRAMUSCULAR; INTRAVENOUS at 02:00

## 2024-03-04 RX ADMIN — ESMOLOL HYDROCHLORIDE 30 MG: 10 INJECTION, SOLUTION INTRAVENOUS at 02:46

## 2024-03-04 RX ADMIN — SODIUM CHLORIDE, POTASSIUM CHLORIDE, SODIUM LACTATE AND CALCIUM CHLORIDE: 600; 310; 30; 20 INJECTION, SOLUTION INTRAVENOUS at 01:43

## 2024-03-04 RX ADMIN — PANTOPRAZOLE SODIUM 40 MG: 40 INJECTION, POWDER, FOR SOLUTION INTRAVENOUS at 10:44

## 2024-03-04 RX ADMIN — ESMOLOL HYDROCHLORIDE 40 MG: 10 INJECTION, SOLUTION INTRAVENOUS at 02:04

## 2024-03-04 RX ADMIN — KETOROLAC TROMETHAMINE 15 MG: 15 INJECTION, SOLUTION INTRAMUSCULAR; INTRAVENOUS at 09:10

## 2024-03-04 RX ADMIN — ESMOLOL HYDROCHLORIDE 30 MG: 10 INJECTION, SOLUTION INTRAVENOUS at 01:55

## 2024-03-04 RX ADMIN — ALBUMIN (HUMAN) 250 ML: 12.5 SOLUTION INTRAVENOUS at 02:45

## 2024-03-04 RX ADMIN — Medication: at 06:04

## 2024-03-04 RX ADMIN — ONDANSETRON 4 MG: 2 INJECTION, SOLUTION INTRAMUSCULAR; INTRAVENOUS at 05:04

## 2024-03-04 RX ADMIN — HEPARIN SODIUM 5000 UNITS: 5000 INJECTION INTRAVENOUS; SUBCUTANEOUS at 17:08

## 2024-03-04 RX ADMIN — Medication 6 L/MIN: at 05:29

## 2024-03-04 RX ADMIN — HYDROMORPHONE HYDROCHLORIDE 1 MG: 1 INJECTION, SOLUTION INTRAMUSCULAR; INTRAVENOUS; SUBCUTANEOUS at 00:50

## 2024-03-04 RX ADMIN — ESMOLOL HYDROCHLORIDE 40 MG: 10 INJECTION, SOLUTION INTRAVENOUS at 02:35

## 2024-03-04 RX ADMIN — SODIUM CHLORIDE, POTASSIUM CHLORIDE, SODIUM LACTATE AND CALCIUM CHLORIDE 100 ML/HR: 600; 310; 30; 20 INJECTION, SOLUTION INTRAVENOUS at 09:11

## 2024-03-04 RX ADMIN — TAZOBACTAM SODIUM AND PIPERACILLIN SODIUM 3.38 G: 375; 3 INJECTION, SOLUTION INTRAVENOUS at 03:22

## 2024-03-04 RX ADMIN — CEFAZOLIN 2 G: 330 INJECTION, POWDER, FOR SOLUTION INTRAMUSCULAR; INTRAVENOUS at 01:51

## 2024-03-04 RX ADMIN — MIDAZOLAM 2 MG: 1 INJECTION INTRAMUSCULAR; INTRAVENOUS at 01:41

## 2024-03-04 RX ADMIN — MAGNESIUM SULFATE HEPTAHYDRATE 2 G: 40 INJECTION, SOLUTION INTRAVENOUS at 12:10

## 2024-03-04 RX ADMIN — ACETAMINOPHEN 1000 MG: 10 INJECTION INTRAVENOUS at 11:15

## 2024-03-04 RX ADMIN — HEPARIN SODIUM 5000 UNITS: 5000 INJECTION INTRAVENOUS; SUBCUTANEOUS at 01:51

## 2024-03-04 RX ADMIN — ONDANSETRON 4 MG: 2 INJECTION, SOLUTION INTRAMUSCULAR; INTRAVENOUS at 01:40

## 2024-03-04 RX ADMIN — HYDROMORPHONE HYDROCHLORIDE 0.5 MG: 1 INJECTION, SOLUTION INTRAMUSCULAR; INTRAVENOUS; SUBCUTANEOUS at 05:43

## 2024-03-04 RX ADMIN — KETOROLAC TROMETHAMINE 15 MG: 15 INJECTION, SOLUTION INTRAMUSCULAR; INTRAVENOUS at 14:34

## 2024-03-04 RX ADMIN — FENTANYL CITRATE 50 MCG: 50 INJECTION, SOLUTION INTRAMUSCULAR; INTRAVENOUS at 02:00

## 2024-03-04 RX ADMIN — ROCURONIUM BROMIDE 40 MG: 10 INJECTION, SOLUTION INTRAVENOUS at 01:55

## 2024-03-04 RX ADMIN — FENTANYL CITRATE 50 MCG: 50 INJECTION, SOLUTION INTRAMUSCULAR; INTRAVENOUS at 01:43

## 2024-03-04 RX ADMIN — Medication 120 MCG: at 01:49

## 2024-03-04 RX ADMIN — SUGAMMADEX 200 MG: 100 INJECTION, SOLUTION INTRAVENOUS at 05:07

## 2024-03-04 RX ADMIN — HYDROMORPHONE HYDROCHLORIDE 0.5 MG: 1 INJECTION, SOLUTION INTRAMUSCULAR; INTRAVENOUS; SUBCUTANEOUS at 06:14

## 2024-03-04 RX ADMIN — Medication 200 MG: at 01:43

## 2024-03-04 RX ADMIN — ESMOLOL HYDROCHLORIDE 30 MG: 10 INJECTION, SOLUTION INTRAVENOUS at 02:12

## 2024-03-04 RX ADMIN — ALBUMIN (HUMAN) 250 ML: 12.5 SOLUTION INTRAVENOUS at 02:03

## 2024-03-04 RX ADMIN — SODIUM CHLORIDE, SODIUM LACTATE, POTASSIUM CHLORIDE, CALCIUM CHLORIDE: 600; 310; 30; 20 INJECTION, SOLUTION INTRAVENOUS at 01:34

## 2024-03-04 RX ADMIN — ESMOLOL HYDROCHLORIDE 30 MG: 10 INJECTION, SOLUTION INTRAVENOUS at 01:58

## 2024-03-04 RX ADMIN — ONDANSETRON 4 MG: 2 INJECTION INTRAMUSCULAR; INTRAVENOUS at 12:10

## 2024-03-04 RX ADMIN — ESMOLOL HYDROCHLORIDE 30 MG: 10 INJECTION, SOLUTION INTRAVENOUS at 02:25

## 2024-03-04 RX ADMIN — ESMOLOL HYDROCHLORIDE 30 MG: 10 INJECTION, SOLUTION INTRAVENOUS at 03:05

## 2024-03-04 RX ADMIN — PROPOFOL 150 MG: 10 INJECTION, EMULSION INTRAVENOUS at 01:43

## 2024-03-04 RX ADMIN — PROPOFOL 50 MG: 10 INJECTION, EMULSION INTRAVENOUS at 04:41

## 2024-03-04 RX ADMIN — HEPARIN SODIUM 5000 UNITS: 5000 INJECTION INTRAVENOUS; SUBCUTANEOUS at 09:10

## 2024-03-04 RX ADMIN — ACETAMINOPHEN 1000 MG: 10 INJECTION INTRAVENOUS at 17:09

## 2024-03-04 RX ADMIN — KETOROLAC TROMETHAMINE 15 MG: 15 INJECTION, SOLUTION INTRAMUSCULAR; INTRAVENOUS at 21:40

## 2024-03-04 RX ADMIN — PIPERACILLIN SODIUM AND TAZOBACTAM SODIUM 3.38 G: 3; .375 INJECTION, SOLUTION INTRAVENOUS at 09:10

## 2024-03-04 RX ADMIN — PIPERACILLIN SODIUM AND TAZOBACTAM SODIUM 3.38 G: 3; .375 INJECTION, SOLUTION INTRAVENOUS at 17:08

## 2024-03-04 RX ADMIN — ROCURONIUM BROMIDE 20 MG: 10 INJECTION, SOLUTION INTRAVENOUS at 03:02

## 2024-03-04 RX ADMIN — LIDOCAINE HYDROCHLORIDE 60 MG: 20 INJECTION, SOLUTION INFILTRATION; PERINEURAL at 01:43

## 2024-03-04 RX ADMIN — METRONIDAZOLE 500 MG: 500 INJECTION, SOLUTION INTRAVENOUS at 03:23

## 2024-03-04 RX ADMIN — PIPERACILLIN SODIUM AND TAZOBACTAM SODIUM 3.38 G: 3; .375 INJECTION, SOLUTION INTRAVENOUS at 21:40

## 2024-03-04 SDOH — SOCIAL STABILITY: SOCIAL INSECURITY: ARE YOU OR HAVE YOU BEEN THREATENED OR ABUSED PHYSICALLY, EMOTIONALLY, OR SEXUALLY BY ANYONE?: NO

## 2024-03-04 SDOH — SOCIAL STABILITY: SOCIAL INSECURITY: ABUSE: ADULT

## 2024-03-04 SDOH — SOCIAL STABILITY: SOCIAL INSECURITY: DOES ANYONE TRY TO KEEP YOU FROM HAVING/CONTACTING OTHER FRIENDS OR DOING THINGS OUTSIDE YOUR HOME?: NO

## 2024-03-04 SDOH — SOCIAL STABILITY: SOCIAL INSECURITY: WERE YOU ABLE TO COMPLETE ALL THE BEHAVIORAL HEALTH SCREENINGS?: YES

## 2024-03-04 SDOH — SOCIAL STABILITY: SOCIAL INSECURITY: ARE THERE ANY APPARENT SIGNS OF INJURIES/BEHAVIORS THAT COULD BE RELATED TO ABUSE/NEGLECT?: NO

## 2024-03-04 SDOH — SOCIAL STABILITY: SOCIAL INSECURITY: DO YOU FEEL UNSAFE GOING BACK TO THE PLACE WHERE YOU ARE LIVING?: NO

## 2024-03-04 SDOH — SOCIAL STABILITY: SOCIAL INSECURITY: HAVE YOU HAD THOUGHTS OF HARMING ANYONE ELSE?: NO

## 2024-03-04 SDOH — SOCIAL STABILITY: SOCIAL INSECURITY: DO YOU FEEL ANYONE HAS EXPLOITED OR TAKEN ADVANTAGE OF YOU FINANCIALLY OR OF YOUR PERSONAL PROPERTY?: NO

## 2024-03-04 SDOH — SOCIAL STABILITY: SOCIAL INSECURITY: HAS ANYONE EVER THREATENED TO HURT YOUR FAMILY OR YOUR PETS?: NO

## 2024-03-04 ASSESSMENT — COLUMBIA-SUICIDE SEVERITY RATING SCALE - C-SSRS
4. HAVE YOU HAD THESE THOUGHTS AND HAD SOME INTENTION OF ACTING ON THEM?: NO
5. HAVE YOU STARTED TO WORK OUT OR WORKED OUT THE DETAILS OF HOW TO KILL YOURSELF? DO YOU INTEND TO CARRY OUT THIS PLAN?: NO
6. HAVE YOU EVER DONE ANYTHING, STARTED TO DO ANYTHING, OR PREPARED TO DO ANYTHING TO END YOUR LIFE?: NO
2. HAVE YOU ACTUALLY HAD ANY THOUGHTS OF KILLING YOURSELF?: NO
6. HAVE YOU EVER DONE ANYTHING, STARTED TO DO ANYTHING, OR PREPARED TO DO ANYTHING TO END YOUR LIFE?: NO
1. IN THE PAST MONTH, HAVE YOU WISHED YOU WERE DEAD OR WISHED YOU COULD GO TO SLEEP AND NOT WAKE UP?: NO

## 2024-03-04 ASSESSMENT — PAIN DESCRIPTION - LOCATION
LOCATION: ABDOMEN
LOCATION: ABDOMEN

## 2024-03-04 ASSESSMENT — COGNITIVE AND FUNCTIONAL STATUS - GENERAL
DAILY ACTIVITIY SCORE: 24
DAILY ACTIVITIY SCORE: 24
MOBILITY SCORE: 24
MOBILITY SCORE: 24
PATIENT BASELINE BEDBOUND: NO

## 2024-03-04 ASSESSMENT — PATIENT HEALTH QUESTIONNAIRE - PHQ9
1. LITTLE INTEREST OR PLEASURE IN DOING THINGS: NOT AT ALL
2. FEELING DOWN, DEPRESSED OR HOPELESS: NOT AT ALL
SUM OF ALL RESPONSES TO PHQ9 QUESTIONS 1 & 2: 0

## 2024-03-04 ASSESSMENT — PAIN SCALES - GENERAL
PAINLEVEL_OUTOF10: 7
PAINLEVEL_OUTOF10: 10 - WORST POSSIBLE PAIN
PAINLEVEL_OUTOF10: 8

## 2024-03-04 ASSESSMENT — ACTIVITIES OF DAILY LIVING (ADL)
PATIENT'S MEMORY ADEQUATE TO SAFELY COMPLETE DAILY ACTIVITIES?: YES
BATHING: INDEPENDENT
FEEDING YOURSELF: INDEPENDENT
WALKS IN HOME: INDEPENDENT
GROOMING: INDEPENDENT
JUDGMENT_ADEQUATE_SAFELY_COMPLETE_DAILY_ACTIVITIES: YES
TOILETING: INDEPENDENT
ADEQUATE_TO_COMPLETE_ADL: YES
DRESSING YOURSELF: INDEPENDENT
HEARING - RIGHT EAR: FUNCTIONAL
HEARING - LEFT EAR: FUNCTIONAL

## 2024-03-04 ASSESSMENT — PAIN DESCRIPTION - DESCRIPTORS: DESCRIPTORS: ACHING;DISCOMFORT

## 2024-03-04 ASSESSMENT — PAIN - FUNCTIONAL ASSESSMENT: PAIN_FUNCTIONAL_ASSESSMENT: 0-10

## 2024-03-04 NOTE — PROGRESS NOTES
"Jewel Rodriguez is a 34 y.o. female on day 0 of admission presenting with Hiatal hernia with obstruction but no gangrene.  Met with patient to introduce myself,r omar and discuss discharge planning.  Patient lives with 4 minor children.  Independent in all adl's.  Requires no assist devices for mobility.  Patient denies active  or home care needs.  Denies any issues with making follow up appointments or getting her medications.,   Patient expressed no questions and no social work needs.    PCP:  ?  Pharmacy:  [facility]  Home Care:  N/A  DME: N/.A        Physical Exam    Last Recorded Vitals  Blood pressure 137/87, pulse 91, temperature 36.2 °C (97.2 °F), temperature source Temporal, resp. rate 16, height 1.676 m (5' 6\"), weight 61.2 kg (135 lb), SpO2 95 %.  Intake/Output last 3 Shifts:  I/O last 3 completed shifts:  In: 2000 (32.7 mL/kg) [I.V.:2000 (32.7 mL/kg)]  Out: 1025 (16.7 mL/kg) [Urine:500 (0.2 mL/kg/hr); Emesis/NG output:300; Drains:85; Blood:50; Chest Tube:90]  Weight: 61.2 kg                Assessment/Plan   Principal Problem:    Hiatal hernia with obstruction but no gangrene            Delia Segura RN      "

## 2024-03-04 NOTE — ANESTHESIA POSTPROCEDURE EVALUATION
Patient: Jewel Rodriguez    Procedure Summary       Date: 03/04/24 Room / Location: [facility] OR 20 / Virtual [facility]    Anesthesia Start: 0134 Anesthesia Stop: 0539    Procedures:       Transabdominal Repair of Diaphragmatic Hernia (Abdomen)      Esophagogastroduodenoscopy (Esophagus) Diagnosis:       Hiatal hernia with obstruction but no gangrene      (Hiatal hernia with obstruction but no gangrene [K44.0])    Surgeons: David Bansal MD Responsible Provider: Lenny Davis DO    Anesthesia Type: general ASA Status: 3 - Emergent            Anesthesia Type: general    Vitals Value Taken Time   /85 03/04/24 0529   Temp 36.1 03/04/24 0545   Pulse 77 03/04/24 0542   Resp 15 03/04/24 0542   SpO2 100 % 03/04/24 0542   Vitals shown include unvalidated device data.    Anesthesia Post Evaluation    Patient participation: complete - patient participated  Level of consciousness: awake and sedated  Pain management: adequate  Airway patency: patent  Cardiovascular status: acceptable  Respiratory status: acceptable  Hydration status: acceptable  Postoperative Nausea and Vomiting: none        No notable events documented.

## 2024-03-04 NOTE — ANESTHESIA PROCEDURE NOTES
Peripheral IV  Date/Time: 3/4/2024 1:48 AM      Placement  Needle size: 18 G  Laterality: left  Location: hand  Local anesthetic: none  Site prep: chlorhexidine  Technique: anatomical landmarks  Attempts: 1

## 2024-03-04 NOTE — OP NOTE
Date: 3/3/2024 - 3/4/2024  OR Location: [facility] OR    Name: Jewel Rodriguez, : 1990, Age: 34 y.o., MRN: [id number], Sex: female    Preop Diagnosis: Para-Conduit hiatal hernia with close loop obstruction   Postop Diagnosis: Para-Conduit hiatal hernia with close loop obstruction    Procedures:  1 Para-Conduit hiatal hernia repair without mesh (modifier 22)  2 Redo laparotomy and lysis of adhesions   3 EGD    Surgeons:  David Bansal MD    Resident/Fellow/Other Assistant:  Surgeon(s) and Role:     * Bailey Hassan MD - Fellow    Anesthesia: General  ASA: III  Anesthesia Staff: Anesthesiologist: Lenny Davis DO  Anesthesia Resident: Casie Hair MD  Estimated Blood Loss: 200mL  Intra-op Medications:   Administrations occurring from 0115 to 0435 on 24:   Medication Name Total Dose   sodium chloride 0.9 % irrigation solution 2,000 mL            Anesthesia Record               Intraprocedure I/O Totals          Intake    LR infusion 2000.00 mL    Total Intake 2000 mL          Specimen:   ID Type Source Tests Collected by Time   1 : hernia sac Tissue HERNIA SAC SURGICAL PATHOLOGY EXAM David Bansal MD 3/4/2024 0336        Staff:   Circulator: Teofilo Gilliam RN  Scrub Person: Bridgette Gregory; Renetta Massey RN    Findings: Para conduit hernia with transverse colon and small bowel in the left chest with closed-loop obstruction.  Small bowel and colon reduced back into the abdomen without requiring bowel resection.  More than 90 minutes of lysis of adhesions was performed due to prior surgery.  Conduit was healthy in end of the case.  Due to complexity and emergency nature of this case, modifier 22 was added to the case.    Indication:  This is a 34-year-old female with history of end-stage achalasia requiring esophagectomy in 2017 who presented with a 1 day history of nausea vomiting and abdominal pain.  Workup showed large pericardial hernia with small bowel and colon in the left chest.  The  bowel was dilated in the chest concerning for close loop obstruction and ischemia.  She is hemodynamic stable without overt signs of sepsis.  Therefore I decided to bring the patient to the OR emergently.  I talked to my general surgery colleague Dr. Gomez for the possibility of bowel resection if I find dead bowel in the chest.  I consented the patient for redo laparotomy repair hiatal hernia and possible bowel resection.  There is no alternative given the emergent nature of the case.  Patient present to proceed with surgery.    Operation Details:  Patient was brought to operation room. A time-out was performed. Patient name, MRN and procedure were confirmed and all staff were in agreement. Patient received subcutaneous heparin and SCDs were placed and working. Ancef was given prior to incision. Patient was induced and intubated without issue. Then a arenas was placed with clean urine.  Nasogastric tube was placed with bilious output.  The abdomen was prepped and drapped in standard fashion. A pre-incision timeout was performed. All staff were in agreement to proceed.     I used the patient's prior upper midline incision for redo laparotomy. I dissect down to the anterior fascia.  I incised the fascia sharply and encountered adhesions as expected.  I meticulously took down adhesions and entered the abdomen safely.  Patient had prior jejunostomy tube.  The bowel proximal to that was herniated into the chest along with transverse colon.  I took down the jejunostomy site sharply.  I closed the site with imbricating 3-0 Vicryl.  I continued my dissection towards the hiatus.  There were many adhesions which I took with cautery and Metzenbaum scissor.  The conduit was identified under the left lobe of the liver.  The right gastroepiploic artery was identified and protected.  The reached the hiatal hernia.  I palpated the hiatus defect which is actually very small.  The colon and the small bowel were tightly stuck in the  left chest and did not reduce back to the abdomen with simple traction.  Therefore, I decided to enlarge the hiatus defect.  I used a right angle clamp to hook under the left crura of the diaphragm.  I opened up the left crura laterally to enlarge the defect.  I opened this defect about 2 cm.  As expected, the left pleura was entered.  There was a gushing of gray fluid draining out from the left chest.  Then I was able to reduce the small bowel and colon back into the abdomen.  I examined the small bowel.  The herniated small bowel starting from the ligament of Treitz down to the prior jejunostomy site.  There were multiple hemorrhagic spots and the bowel was edematous.  There was no valeria perforation or necrosis.  The transverse colon was pretty healthy and decompressed.  I packed the bowel back into the pelvis and plan to reexamine them.  I continued dissecting on hiatus to resect as much hernia sac as possible.  I decided to leave a left chest tube since I have entered the left pleural.    I turned my attention back to the defect.  I used interrupted 0 Ethibond with pledgets to close the defect up to the conduit.  I placed 3 separate 0 Ethibond sutures and closed the diaphragm and the hiatal defect.  I was able to put 1 finger between the dipika-hiatus alongside the conduit.  I left a AAKASH drain at the hiatus to continue to drain.    At this point I have my general surgery colleague Dr. Gomez come to the room.  She examined the small bowel and colon.  The small bowel has pinked up after reduced into the abdomen.  She thought the small bowel and colon healthy and did not require resection.  And then I performed EGD using adult gastroscope.  The conduit was healthy with bilious content.  I sucked out the bilious content examining the mucosa of the conduit.  The mucosa was healthy and pink.  I left a NG tube to continue decompress conduit.    Then I washed out the abdomen with warm saline.  I ensured hemostasis.  I  closed the midline laparotomy with looped 0 PDS sutures and the skin was closed with staplers.  All count was correct.  The patient was allowed to wake up with anesthesia and brought to the recovery room in stable addition.    I was present for the entire procedure.    David Bansal MD  Thoracic Surgeon  [facility]  [facility]  Office phone: [phone]  Fax: [phone]  Pager: [phone]

## 2024-03-04 NOTE — CONSULTS
"Thoracic Surgery   Reason For Consult  Hx esophagectomy, presents with N&V, SBO with     History Of Present Illness  Jewel Rodriguez is a 34 y.o. female with a history of esophagectomy in 2017 by Dr. Sexton presenting with nausea and vomiting and pelvic discomfort. Her symptoms started at 2:00am last evening. She has not been able to keep anything down since that time.  CT imaging performed this evening demonstrating expected post op changes following esophagectomy and high-grade closely loop small bowel obstruction secondary to a large hiatal hernia involving a significant loop of small bowel which is markedly dilated. There is also evidence of mild concentric wall thickening and inflammation of the rectum, correlate with clinical presentation for possibility of proctitis. Thoracic Surgery has been consulted for evaluation and possible management.      Past Medical History  malignant neoplasm of vagina  Mild hyperemesis gravidarum  Personal history of other infectious and parasitic diseases  Personal history of other mental and behavioral disorders    Surgical History  Esophagectomy for achalasia (08/10/2017)  Colposcopy (04/15/2014)   section, classic (04/15/2014)     Social History  ETOH use yesterday     Family History  No family history on file.     Allergies  Patient has no known allergies.    Review of Systems  + nausea, + vomiting, +hiccuping, + pelvic pain  10 pt ROS otherwise negative.      Physical Exam  Well-nourished, well-developed, ill-appearing young female in [facility] [place]   Blood pressure 141/87, pulse 93, temperature 37 °C (98.6 °F), temperature source Tympanic, resp. rate 14, height 1.676 m (5' 6\"), weight 61.2 kg (135 lb), SpO2 97 %.  Breathing comfortably on room air.   HRR. NSR 80s per bedside tele  Abdomen soft, non-distended. + tender over pelvic area. Healed midline.  WILLETT x 4, No LE edema  Neuro grossly intact    Relevant Results  CT chest/abdomen " 3/3/2024:  High-grade closely loop small bowel obstruction secondary to a large  hiatal hernia involving a significant loop of small bowel which is  markedly dilated.  Postoperative changes of esophagectomy and gastric pull-through.  Mild concentric wall thickening and inflammation of the rectum,  correlate with clinical presentation for possibility of proctitis.     Assessment/Plan   34 year old female with a history of esophagectomy in 2017 for achalasia presents with n/v and pelvic pain with radiographic findings of High-grade closely loop small bowel obstruction secondary to a large  hiatal hernia involving a significant loop of small bowel which is  markedly dilated and mild concentric wall thickening and inflammation of the rectum, concerning for possible of proctitis.    H&P and imaging presented to Dr. Bansal     ~Emergent OR  ~Type and screen, pregnancy test now  ~OR request placed, OR notified, Dr. Davis/anesthesia aware  ~consult to ACS for OR support  ~informed consent obtained by ACS chief.     Barbie Tapia, APRN-CNP

## 2024-03-04 NOTE — ANESTHESIA PROCEDURE NOTES
Airway  Date/Time: 3/4/2024 1:46 AM  Urgency: elective    Airway not difficult    Staffing  Performed: resident   Authorized by: La Nena Wolf MD    Performed by: Casie Hair MD  Patient location during procedure: OR    Indications and Patient Condition  Indications for airway management: anesthesia  Sedation level: deep  Preoxygenated: yes  Patient position: sniffing  Mask difficulty assessment: 0 - not attempted    Final Airway Details  Final airway type: endotracheal airway      Successful airway: ETT  Cuffed: yes   Successful intubation technique: direct laryngoscopy  Facilitating devices/methods: intubating stylet  Endotracheal tube insertion site: oral  Blade: Rfay  Blade size: #3  ETT size (mm): 7.0  Cormack-Lehane Classification: grade I - full view of glottis  Placement verified by: chest auscultation and capnometry   Measured from: gums  ETT to gums (cm): 21  Number of attempts at approach: 1

## 2024-03-04 NOTE — ANESTHESIA PREPROCEDURE EVALUATION
Patient: Jewel Rodriguez    Procedure Information       Date/Time: 03/04/24 0115    Procedure: Repair Diaphragmatic Hernia Laparoscopy, possible open (Abdomen)    Location: [facility] OR 20 / Virtual [facility] OR    Surgeons: David Bansal MD            Relevant Problems   GI   (+) Hiatal hernia with obstruction but no gangrene       Clinical information reviewed:   Tobacco  Allergies    Med Hx  Surg Hx   Fam Hx  Soc Hx        NPO Detail:  No data recorded     Physical Exam    Airway  Mallampati: II  TM distance: >3 FB  Neck ROM: full     Cardiovascular - normal exam  Rhythm: regular  Rate: normal     Dental    Pulmonary - normal exam  Breath sounds clear to auscultation     Abdominal            Anesthesia Plan    History of general anesthesia?: yes  History of complications of general anesthesia?: no    ASA 3 - emergent     general     Patient did not smoke on day of procedure.    intravenous induction   Postoperative administration of opioids is intended.  Anesthetic plan and risks discussed with patient.  Use of blood products discussed with patient who consented to blood products.    Plan discussed with attending.

## 2024-03-04 NOTE — H&P (VIEW-ONLY)
"Thoracic Surgery   Reason For Consult  Hx esophagectomy, presents with N&V, SBO with     History Of Present Illness  Jewel Rodriguez is a 34 y.o. female with a history of esophagectomy in 2017 by Dr. Sexton presenting with nausea and vomiting and pelvic discomfort. Her symptoms started at 2:00am last evening. She has not been able to keep anything down since that time.  CT imaging performed this evening demonstrating expected post op changes following esophagectomy and high-grade closely loop small bowel obstruction secondary to a large hiatal hernia involving a significant loop of small bowel which is markedly dilated. There is also evidence of mild concentric wall thickening and inflammation of the rectum, correlate with clinical presentation for possibility of proctitis. Thoracic Surgery has been consulted for evaluation and possible management.      Past Medical History  malignant neoplasm of vagina  Mild hyperemesis gravidarum  Personal history of other infectious and parasitic diseases  Personal history of other mental and behavioral disorders  Breast CA   Lung nodules     Surgical History  Esophagectomy for achalasia (08/10/2017)  Colposcopy (04/15/2014)   section, classic (04/15/2014)     Social History  ETOH use yesterday     Family History  No family history on file.     Allergies  Patient has no known allergies.    Review of Systems  + nausea, + vomiting, +hiccuping, + pelvic pain  10 pt ROS otherwise negative.      Physical Exam  Well-nourished, well-developed, ill-appearing young female in [facility] seen [place].   Blood pressure 141/87, pulse 93, temperature 37 °C (98.6 °F), temperature source Tympanic, resp. rate 14, height 1.676 m (5' 6\"), weight 61.2 kg (135 lb), SpO2 97 %.  Breathing comfortably on room air.   HRR. NSR 80s per bedside tele  Abdomen soft, non-distended. + tender over pelvic area. Healed midline.  WILLETT x 4, No LE edema  Neuro grossly intact    Relevant Results  CT " chest/abdomen 3/3/2024:  High-grade closely loop small bowel obstruction secondary to a large  hiatal hernia involving a significant loop of small bowel which is  markedly dilated.  Postoperative changes of esophagectomy and gastric pull-through.  Mild concentric wall thickening and inflammation of the rectum,  correlate with clinical presentation for possibility of proctitis.     Assessment/Plan   34 year old female with a history of esophagectomy in 2017 for achalasia presents with n/v and pelvic pain with radiographic findings of High-grade closely loop small bowel obstruction secondary to a large  hiatal hernia involving a significant loop of small bowel which is  markedly dilated and mild concentric wall thickening and inflammation of the rectum, concerning for possible of proctitis.    H&P and imaging presented to Dr. Bansal     ~Emergent OR  ~Type and screen, pregnancy test now  ~OR request placed, OR notified, Dr. Davis/anesthesia aware  ~consult to ACS for OR support  ~informed consent obtained by ACS chief.     Barbie Tapia, APRN-CNP

## 2024-03-04 NOTE — ED PROVIDER NOTES
CC: Abdominal Pain     HPI:  Patient is a 34-year-old female with a past medical history of achalasia s/p surgery 7 years ago, who presents emergency department today with sharp lower abdominal pain in addition to nausea and vomiting that started around 2 AM this morning.  Patient states she was drinking significant amounts of alcohol prior to the onset of pain. Reports she's had similar pain in the past with dehydration. Also endorse constipation. Last BM was 1 week ago. Took a suppository this AM with very loose stools. Patient denies any fever, chills, shortness of breath or chest pain. Denies any concern for pregnancy. LNMP was early February. No abnormal vaginal discharge, no urinary symptoms. No other associated signs or symptoms reported at this time.    Limitations to History: none  Additional History provided by:  N/A    External Records Reviewed:  Recent available ED and inpatient notes reviewed in EMR.  Reviewed records from 02/15/2021     PMHx/PSHx:  Per HPI.   - has a past medical history of Encounter for screening for malignant neoplasm of vagina, Mild hyperemesis gravidarum, Other conditions influencing health status, Other conditions influencing health status, Other specified noninflammatory disorders of vagina, Other specified noninflammatory disorders of vagina, Other specified noninflammatory disorders of vagina, Personal history of other diseases of the female genital tract, Personal history of other diseases of the female genital tract, Personal history of other infectious and parasitic diseases, Personal history of other mental and behavioral disorders, Personal history of other specified conditions, and Unspecified abdominal pain (2013).  - has a past surgical history that includes Other surgical history (08/10/2017); Other surgical history (2017); Colposcopy (04/15/2014);  section, classic (04/15/2014); and Other surgical history (04/15/2014).    Medications:  Reviewed in  EMR. See EMR for complete list of medications and doses.    Allergies:  Patient has no allergy information on record.    Social History:  - Tobacco:  reports that she has never smoked. She has never used smokeless tobacco.   - Alcohol:  has no history on file for alcohol use. Drank alcohol heavily last night into this morning prior to onset of symptoms.  - Illicit Drugs:  reports current drug use. Drug: Marijuana.     ROS:  Per HPI.     ???????????????????????????????????????????????????????????????  Triage Vitals:  T 37 °C (98.6 °F)  HR 88  /80  RR 18  O2 98 %      Physical Exam  Constitutional:       Appearance: Normal appearance. She is not toxic-appearing.   HENT:      Head: Normocephalic and atraumatic.      Mouth/Throat:      Mouth: Mucous membranes are moist.   Eyes:      General: No scleral icterus.     Conjunctiva/sclera: Conjunctivae normal.   Cardiovascular:      Rate and Rhythm: Normal rate and regular rhythm.      Pulses: Normal pulses.   Pulmonary:      Effort: Pulmonary effort is normal.      Breath sounds: Normal breath sounds.   Abdominal:      General: Bowel sounds are normal. There is no distension.      Palpations: Abdomen is soft.      Tenderness: There is abdominal tenderness in the right lower quadrant, suprapubic area and left lower quadrant. There is no right CVA tenderness or left CVA tenderness.      Hernia: No hernia is present.      Comments: Patient initially in fetal position due to pain, able to lay supine for examination. No involuntary or voluntary guarding   Genitourinary:     Rectum: No tenderness.      Comments: Chaperoned rectal exam performed without any stool  Musculoskeletal:         General: Normal range of motion.      Cervical back: Normal range of motion and neck supple.   Skin:     General: Skin is warm and dry.   Neurological:      General: No focal deficit present.      Mental Status: She is alert and oriented to person, place, and time.  "      ???????????????????????????????????????????????????????????????  ED Course:       EKG & Images:  Independently reviewed, See ED Course      MDM:  -Patient is a 34-year-old female with a past medical history of achalasia status post surgery approximately 7 years ago who presents emergency department today with nausea, vomiting, constipation and abdominal pain.  Onset of symptoms with cough approximately 2 AM this morning, after the patient had a significant amount of alcohol.  Upon reviewing the patient's records from February 2021 seems that the patient presented to an outside emergency department for similar symptoms with nausea, vomiting, lower abdominal pain after she had some heavy alcohol with tequila.  Due to the the patient's presenting symptoms and tenderness on examination an acute abdominal series was ordered.  In addition to this CMP, CBC, UA, UA pregnancy, lipase, VBG.  On examination the patient has suprapubic/pelvic pain diffusely right greater than left.  At time of signout to oncoming provider follow-up on labs, imaging and further workup and disposition pending.    Final diagnoses:   None         Social Determinants Limiting Care:  None identified    Disposition:  Handoff    Umm \"Justin\" Nena  Emergency Medicine Resident, PGY1  [facility]   This patient was seen and staffed with Dr. Ro    Disclaimer: This note was dictated by speech recognition. Minor errors in transcription may be present    Procedures ? MOGs last updated 3/3/2024 7:57 PM        Umm Barrett,   Resident  03/03/24 2009       Umm Barrett,   Resident  03/03/24 2014    "

## 2024-03-04 NOTE — BRIEF OP NOTE
Date: 3/3/2024 - 3/4/2024  OR Location: [facility] OR    Name: Jewel Rodriguez, : 1990, Age: 34 y.o., MRN: [id number], Sex: female    Diagnosis  Pre-op Diagnosis     * Hiatal hernia with obstruction but no gangrene [K44.0] Post-op Diagnosis     * Hiatal hernia with obstruction but no gangrene [K44.0]     Procedures  Transabdominal Repair of Diaphragmatic Hernia  00247 - CT LAPS RPR PARAESPHGL HRNA INCL FUNDPLSTY W/O MESH    Esophagogastroduodenoscopy  82924 - CT ESOPHAGOGASTRODUODENOSCOPY TRANSORAL DIAGNOSTIC      Surgeons      * David Bansal - Primary    Resident/Fellow/Other Assistant:  Surgeon(s) and Role:     * Bailey Hassan MD - Fellow    Procedure Summary  Anesthesia: General  ASA: III  Anesthesia Staff: Anesthesiologist: Lenny Davis DO  Anesthesia Resident: Casie Hair MD  Estimated Blood Loss: 50 mL  Intra-op Medications:   Administrations occurring from 0115 to 0435 on 24:   Medication Name Total Dose   sodium chloride 0.9 % irrigation solution 2,000 mL              Anesthesia Record               Intraprocedure I/O Totals          Intake    LR infusion 2000.00 mL    Total Intake 2000 mL       Output    Urine 275 mL    Est. Blood Loss 50 mL    Total Output 325 mL       Net    Net Volume 1675 mL          Specimen:   ID Type Source Tests Collected by Time   1 : hernia sac Tissue HERNIA SAC SURGICAL PATHOLOGY EXAM David Bansal MD 3/4/2024 0336        Staff:   Circulator: Teofilo Gilliam RN  Scrub Person: Bridgette Gregory; Renetta Massey RN          Findings: Paraesophageal hernia with incarcerated small bowel and transverse colon, confirmed viable bowel along with ACS intra-op consult, reapproximated crura, EGD end of case conduit viable.  Chest tube left pleura and Blank drain in abdomen placed near hiatus    IVF crystalloid 2L, albumin 500 ml, NG tube 300 ml bilious,  ml.  3.5 hour case    Complications:  None; patient tolerated the procedure well.     Disposition: PACU  - hemodynamically stable.  Condition: stable  Specimens Collected:   ID Type Source Tests Collected by Time   1 : hernia sac Tissue HERNIA SAC SURGICAL PATHOLOGY EXAM David Bansal MD 3/4/2024 3670     Attending Attestation: I was present and scrubbed for the entire procedure.    David Bansal  Phone Number: [phone]

## 2024-03-04 NOTE — INTERVAL H&P NOTE
This is a 34-year-old female with history of achalasia status post three-field esophagectomy by Dr Sexton in 2017 who presented with 24 hours nausea vomiting and abdominal pain.   She had history of constipation but this time pain does not improve.  Patient is hemodynamic stable and abdominal exam is benign.  Chest x-ray and CAT scan personally reviewed showed para-conduit hernia with loop of small bowel in the left chest concerning for high-grade closed-loop small bowel obstruction.  Given this finding, I decided to bring the patient to the operating room emergently to repair the hernia and reduce the bowel.  I will examine the bowel carefully in the operating room to ensure no ischemia or necrotic change.  If that is the case, I will have my general surgery colleague Dr. Gomez to perform bowel resection.    I have a candid discussion with the patient.  I outlined the treatment plan and the emergency of the operation.  The risk of surgery includes bleeding, infection, injury to nearby organs and postop pain.  Patient consent to proceed with surgery.    David Bansal MD  Thoracic Surgeon  [facility]   of Medicine  [facility]  Office phone: [phone]  Fax: [phone]  Pager: [phone]

## 2024-03-04 NOTE — CONSULTS
[facility]  ACUTE CARE SURGERY - HISTORY AND PHYSICAL    Patient Name: Jewel Rodriguez MRN: [id number]  Admit Date: 303  : 1990  AGE: 34 y.o.   GENDER: female    ==============================================================================  ASSESSMENT AND PLAN:  33 yo F with achalasia s/p esophagectomy in 2017 presenting with one day of abdominal pain, nausea, vomiting, and chest discomfort, found on CT to have a hiatal hernia containing a closed loop small bowel obstruction. Taken to OR with Thoracic Surgery for emergent reduction.    - ACS available for assistance intra-operatively  - Additional recs depending on findings in OR    D/w Dr. Patricia Oates MD  General Surgery PGY4  ACS [id number]    ==============================================================================    CHIEF COMPLAINT:  Hiatal hernia with closed loop small bowel obstruction    HISTORY OF PRESENT ILLNESS:  33 yo F with achalasia s/p esophagectomy in 2017 presents with one day of abdominal discomfort, nausea, and vomiting, and a few hours of chest discomfort. Here in ED found on CT C/A/P to have a hiatal hernia with a closed loop small bowel obstruction with mild degree of bowel wall thickening. Gastric conduit markedly dilated. Thoracic Surgery taking patient to OR emergently for reduction and repair. ACS consulted for possible bowel resection.    PAST MEDICAL HISTORY:  Past Medical History:   Diagnosis Date    Encounter for screening for malignant neoplasm of vagina     Vaginal Pap smear    Mild hyperemesis gravidarum     Hyperemesis gravidarum    Other conditions influencing health status     Menstruation    Other conditions influencing health status     H/O pregnancy    Other specified noninflammatory disorders of vagina     Vaginal odor    Other specified noninflammatory disorders of vagina     Vaginal irritation    Other specified noninflammatory disorders of vagina     Vaginal  itching    Personal history of other diseases of the female genital tract     Personal history of amenorrhea    Personal history of other diseases of the female genital tract     History of vaginal discharge    Personal history of other infectious and parasitic diseases     History of gonorrhea    Personal history of other mental and behavioral disorders     History of depression    Personal history of other specified conditions     History of abnormal Pap smear    Unspecified abdominal pain 2013    Abdominal pain       PAST SURGICAL HISTORY:  Past Surgical History:   Procedure Laterality Date     SECTION, CLASSIC  04/15/2014     Section    COLPOSCOPY  04/15/2014    Colposcopy    OTHER SURGICAL HISTORY  08/10/2017    Esophagectomy    OTHER SURGICAL HISTORY  2017    Esophageal Surgery Heller Myotomy    OTHER SURGICAL HISTORY  04/15/2014    Upper Gastrointestinal Endoscopy (Therapeutic)       PAST SOCIAL HISTORY:  Denies tobacco use  + EtOH use  + Marijuana  Denies use of illicit or recreational drugs    PAST FAMILY HISTORY:  No family history on file.    HOME MEDICATIONS:  Prior to Admission medications    Not on File     ALLERGIES  No Known Allergies    REVIEW OF SYSTEMS:  14-point ROS performed; negative unless otherwise indicated in HPI.    ==============================================================================    VITALS:  Vitals:    24 0005   BP: 141/87   Pulse: 93   Resp: 14   Temp:    SpO2: 97%       PHYSICAL EXAM:  Gen:  NAD, non-toxic appearing  HEENT:  Atraumatic, normocephalic  Eyes:  No scleral icterus  Card:  RRR  Resp:  Non-labored breathing on RA  Abd:  Soft, moderately tender to palpation in low abdomen; non-distended; well healed prior surgical incision  Ext:  WWP, no swelling of BLE  MSK:  WILLETT  Neuro:  AOx3, no gross neurological deficits  Psych:  Appropriate mood and affect    IMAGING SUMMARY:  CT C/A/P 3/3/24: hiatal hernia with a closed loop small bowel  obstruction with mild degree of bowel wall thickening; gastric conduit markedly dilated    LABS:  Results for orders placed or performed during the hospital encounter of 03/03/24 (from the past 24 hour(s))   Blood Gas Venous Full Panel Unsolicited   Result Value Ref Range    POCT pH, Venous 7.35 7.33 - 7.43 pH    POCT pCO2, Venous 35 (L) 41 - 51 mm Hg    POCT pO2, Venous 49 (H) 35 - 45 mm Hg    POCT SO2, Venous 75 45 - 75 %    POCT Oxy Hemoglobin, Venous 74.0 45.0 - 75.0 %    POCT Hematocrit Calculated, Venous 39.0 36.0 - 46.0 %    POCT Sodium, Venous 136 136 - 145 mmol/L    POCT Potassium, Venous 3.4 (L) 3.5 - 5.3 mmol/L    POCT Chloride, Venous 104 98 - 107 mmol/L    POCT Ionized Calicum, Venous 1.17 1.10 - 1.33 mmol/L    POCT Glucose, Venous 153 (H) 74 - 99 mg/dL    POCT Lactate, Venous 3.8 (H) 0.4 - 2.0 mmol/L    POCT Base Excess, Venous -5.6 (L) -2.0 - 3.0 mmol/L    POCT HCO3 Calculated, Venous 19.3 (L) 22.0 - 26.0 mmol/L    POCT Hemoglobin, Venous 13.0 12.0 - 16.0 g/dL    POCT Anion Gap, Venous 16.0 10.0 - 25.0 mmol/L    Patient Temperature 37.0 degrees Celsius   CBC and Auto Differential   Result Value Ref Range    WBC 10.9 4.4 - 11.3 x10*3/uL    nRBC 0.0 0.0 - 0.0 /100 WBCs    RBC 4.62 4.00 - 5.20 x10*6/uL    Hemoglobin 12.5 12.0 - 16.0 g/dL    Hematocrit 37.8 36.0 - 46.0 %    MCV 82 80 - 100 fL    MCH 27.1 26.0 - 34.0 pg    MCHC 33.1 32.0 - 36.0 g/dL    RDW 14.5 11.5 - 14.5 %    Platelets 310 150 - 450 x10*3/uL    Neutrophils % 88.5 40.0 - 80.0 %    Immature Granulocytes %, Automated 0.4 0.0 - 0.9 %    Lymphocytes % 8.1 13.0 - 44.0 %    Monocytes % 2.8 2.0 - 10.0 %    Eosinophils % 0.0 0.0 - 6.0 %    Basophils % 0.2 0.0 - 2.0 %    Neutrophils Absolute 9.67 (H) 1.20 - 7.70 x10*3/uL    Immature Granulocytes Absolute, Automated 0.04 0.00 - 0.70 x10*3/uL    Lymphocytes Absolute 0.88 (L) 1.20 - 4.80 x10*3/uL    Monocytes Absolute 0.31 0.10 - 1.00 x10*3/uL    Eosinophils Absolute 0.00 0.00 - 0.70 x10*3/uL     Basophils Absolute 0.02 0.00 - 0.10 x10*3/uL   Comprehensive metabolic panel   Result Value Ref Range    Glucose 140 (H) 74 - 99 mg/dL    Sodium 137 136 - 145 mmol/L    Potassium 3.6 3.5 - 5.3 mmol/L    Chloride 103 98 - 107 mmol/L    Bicarbonate 16 (L) 21 - 32 mmol/L    Anion Gap 22 (H) 10 - 20 mmol/L    Urea Nitrogen 8 6 - 23 mg/dL    Creatinine 0.75 0.50 - 1.05 mg/dL    eGFR >90 >60 mL/min/1.73m*2    Calcium 9.7 8.6 - 10.6 mg/dL    Albumin 4.8 3.4 - 5.0 g/dL    Alkaline Phosphatase 66 33 - 110 U/L    Total Protein 8.5 (H) 6.4 - 8.2 g/dL    AST 28 9 - 39 U/L    Bilirubin, Total 0.4 0.0 - 1.2 mg/dL    ALT 16 7 - 45 U/L   Lipase   Result Value Ref Range    Lipase 58 9 - 82 U/L       I have reviewed all laboratory and imaging results ordered/pertinent for this encounter.

## 2024-03-04 NOTE — PROGRESS NOTES
"[facility]  ACUTE CARE SURGERY - PROGRESS NOTE    Patient Name: Jewel Rodriguez  MRN: [id number]  Admit Date: 303  : 1990  AGE: 34 y.o.   GENDER: female  ==============================================================================  TODAY'S ASSESSMENT AND PLAN OF CARE:  35 yo F with achalasia s/p esophagectomy in 2017 presenting with one day of abdominal pain, nausea, vomiting, and chest discomfort, found on CT to have a hiatal hernia containing a closed loop small bowel obstruction. Taken to OR with Thoracic Surgery for emergent reduction.  ACS consulted intra-op to examine small bowel and colon for which appeared healthy and no resection required.     Recs:  - no indication for general surgery intervention  - Continue care per Thoracic team  - Please call ACS with questions or concerns.     [facility]   [facility] Surgery  Pager [phone]    ==============================================================================  CHIEF COMPLAINT / EVENTS LAST 24HRS / HPI:  No acute events since surgery. Patient endorsing abdominal pain/discomfort. Denies nausea. NGT in place with no output     MEDICAL HISTORY / ROS:   Admission history and ROS reviewed. Pertinent changes as follows:  No new imaging     PHYSICAL EXAM:  Heart Rate:  []   Temp:  [35.8 °C (96.4 °F)-37 °C (98.6 °F)]   Resp:  [10-20]   BP: (117-164)/()   Height:  [167.6 cm (5' 6\")]   Weight:  [61.2 kg (135 lb)]   SpO2:  [93 %-100 %]   Physical Exam  HENT:      Nose:      Comments: NGT in place to LIWS no output  Eyes:      Extraocular Movements: Extraocular movements intact.   Pulmonary:      Comments: Non labored, good chest expansion, Left sided chest tube x2 in place with serous sang output  Abdominal:      Palpations: Abdomen is soft.      Comments: Non distended appropriately tender to palpitation. Midline incision with original dressing in " place   Genitourinary:     Comments: Pleitez in place  Musculoskeletal:         General: Normal range of motion.   Skin:     General: Skin is warm and dry.   Neurological:      Mental Status: She is alert and oriented to person, place, and time.   Psychiatric:         Behavior: Behavior normal.       LABS:  Results for orders placed or performed during the hospital encounter of 03/03/24 (from the past 24 hour(s))   Blood Gas Venous Full Panel Unsolicited   Result Value Ref Range    POCT pH, Venous 7.35 7.33 - 7.43 pH    POCT pCO2, Venous 35 (L) 41 - 51 mm Hg    POCT pO2, Venous 49 (H) 35 - 45 mm Hg    POCT SO2, Venous 75 45 - 75 %    POCT Oxy Hemoglobin, Venous 74.0 45.0 - 75.0 %    POCT Hematocrit Calculated, Venous 39.0 36.0 - 46.0 %    POCT Sodium, Venous 136 136 - 145 mmol/L    POCT Potassium, Venous 3.4 (L) 3.5 - 5.3 mmol/L    POCT Chloride, Venous 104 98 - 107 mmol/L    POCT Ionized Calicum, Venous 1.17 1.10 - 1.33 mmol/L    POCT Glucose, Venous 153 (H) 74 - 99 mg/dL    POCT Lactate, Venous 3.8 (H) 0.4 - 2.0 mmol/L    POCT Base Excess, Venous -5.6 (L) -2.0 - 3.0 mmol/L    POCT HCO3 Calculated, Venous 19.3 (L) 22.0 - 26.0 mmol/L    POCT Hemoglobin, Venous 13.0 12.0 - 16.0 g/dL    POCT Anion Gap, Venous 16.0 10.0 - 25.0 mmol/L    Patient Temperature 37.0 degrees Celsius   CBC and Auto Differential   Result Value Ref Range    WBC 10.9 4.4 - 11.3 x10*3/uL    nRBC 0.0 0.0 - 0.0 /100 WBCs    RBC 4.62 4.00 - 5.20 x10*6/uL    Hemoglobin 12.5 12.0 - 16.0 g/dL    Hematocrit 37.8 36.0 - 46.0 %    MCV 82 80 - 100 fL    MCH 27.1 26.0 - 34.0 pg    MCHC 33.1 32.0 - 36.0 g/dL    RDW 14.5 11.5 - 14.5 %    Platelets 310 150 - 450 x10*3/uL    Neutrophils % 88.5 40.0 - 80.0 %    Immature Granulocytes %, Automated 0.4 0.0 - 0.9 %    Lymphocytes % 8.1 13.0 - 44.0 %    Monocytes % 2.8 2.0 - 10.0 %    Eosinophils % 0.0 0.0 - 6.0 %    Basophils % 0.2 0.0 - 2.0 %    Neutrophils Absolute 9.67 (H) 1.20 - 7.70 x10*3/uL    Immature  Granulocytes Absolute, Automated 0.04 0.00 - 0.70 x10*3/uL    Lymphocytes Absolute 0.88 (L) 1.20 - 4.80 x10*3/uL    Monocytes Absolute 0.31 0.10 - 1.00 x10*3/uL    Eosinophils Absolute 0.00 0.00 - 0.70 x10*3/uL    Basophils Absolute 0.02 0.00 - 0.10 x10*3/uL   Comprehensive metabolic panel   Result Value Ref Range    Glucose 140 (H) 74 - 99 mg/dL    Sodium 137 136 - 145 mmol/L    Potassium 3.6 3.5 - 5.3 mmol/L    Chloride 103 98 - 107 mmol/L    Bicarbonate 16 (L) 21 - 32 mmol/L    Anion Gap 22 (H) 10 - 20 mmol/L    Urea Nitrogen 8 6 - 23 mg/dL    Creatinine 0.75 0.50 - 1.05 mg/dL    eGFR >90 >60 mL/min/1.73m*2    Calcium 9.7 8.6 - 10.6 mg/dL    Albumin 4.8 3.4 - 5.0 g/dL    Alkaline Phosphatase 66 33 - 110 U/L    Total Protein 8.5 (H) 6.4 - 8.2 g/dL    AST 28 9 - 39 U/L    Bilirubin, Total 0.4 0.0 - 1.2 mg/dL    ALT 16 7 - 45 U/L   Lipase   Result Value Ref Range    Lipase 58 9 - 82 U/L   Type and Screen   Result Value Ref Range    ABO TYPE O     Rh TYPE POS     ANTIBODY SCREEN NEG    CBC   Result Value Ref Range    WBC 19.8 (H) 4.4 - 11.3 x10*3/uL    nRBC 0.0 0.0 - 0.0 /100 WBCs    RBC 4.52 4.00 - 5.20 x10*6/uL    Hemoglobin 12.4 12.0 - 16.0 g/dL    Hematocrit 37.9 36.0 - 46.0 %    MCV 84 80 - 100 fL    MCH 27.4 26.0 - 34.0 pg    MCHC 32.7 32.0 - 36.0 g/dL    RDW 14.8 (H) 11.5 - 14.5 %    Platelets 209 150 - 450 x10*3/uL   Basic metabolic panel   Result Value Ref Range    Glucose 170 (H) 74 - 99 mg/dL    Sodium 137 136 - 145 mmol/L    Potassium 4.4 3.5 - 5.3 mmol/L    Chloride 103 98 - 107 mmol/L    Bicarbonate 24 21 - 32 mmol/L    Anion Gap 14 10 - 20 mmol/L    Urea Nitrogen 9 6 - 23 mg/dL    Creatinine 0.66 0.50 - 1.05 mg/dL    eGFR >90 >60 mL/min/1.73m*2    Calcium 8.5 (L) 8.6 - 10.6 mg/dL   Magnesium   Result Value Ref Range    Magnesium 1.86 1.60 - 2.40 mg/dL     MEDICATIONS:  Current Facility-Administered Medications   Medication Dose Route Frequency Provider Last Rate Last Admin    acetaminophen (Ofirmev)  injection 1,000 mg  1,000 mg intravenous q6h [facility] SHERICE Meyers-CNP   1,000 mg at 03/04/24 1115    albuterol 2.5 mg /3 mL (0.083 %) nebulizer solution 2.5 mg  2.5 mg nebulization q6h PRN Bailey Hassan MD        heparin (porcine) injection 5,000 Units  5,000 Units subcutaneous q8h Bailey Hassan MD   5,000 Units at 03/04/24 0910    hydromorphone PCA 0.5 mg/mL in NS opioid naive   intravenous Continuous Bailey Hassan MD   New Syringe/Cartridge at 03/04/24 0604    ketorolac (Toradol) injection 15 mg  15 mg intravenous q6h PRN Bailey Hassan MD   15 mg at 03/04/24 1434    lactated Ringer's infusion  100 mL/hr intravenous Continuous Bailey Hassan  mL/hr at 03/04/24 0911 100 mL/hr at 03/04/24 0911    naloxone (Narcan) injection 0.2 mg  0.2 mg intravenous q5 min PRN Bailey Hassan MD        naloxone (Narcan) injection 0.2 mg  0.2 mg intravenous PRN Bailey Hassan MD        ondansetron ODT (Zofran-ODT) disintegrating tablet 4 mg  4 mg oral q8h PRN Bailey Hassan MD        Or    ondansetron (Zofran) injection 4 mg  4 mg intravenous q8h PRN Bailey Hassan MD   4 mg at 03/04/24 1210    oxygen (O2) therapy   inhalation Continuous - 02/gases Bailey Hassan MD        pantoprazole (ProtoNix) injection 40 mg  40 mg intravenous Daily SHERICE Meyers-CNP   40 mg at 03/04/24 1044    piperacillin-tazobactam-dextrose (Zosyn) IV 3.375 g  3.375 g intravenous q6h Bailey Hassan MD   Stopped at 03/04/24 0940       IMAGING SUMMARY:  (summary of new imaging findings, not a copy of dictation)  No new imaging     I have reviewed all laboratory and imaging results ordered/pertinent for today's encounter.

## 2024-03-05 ENCOUNTER — APPOINTMENT (OUTPATIENT)
Dept: RADIOLOGY | Facility: HOSPITAL | Age: 34
End: 2024-03-05
Payer: MEDICAID

## 2024-03-05 LAB
ANION GAP SERPL CALC-SCNC: 15 MMOL/L (ref 10–20)
APPEARANCE UR: CLEAR
BILIRUB UR STRIP.AUTO-MCNC: NEGATIVE MG/DL
BUN SERPL-MCNC: 8 MG/DL (ref 6–23)
CALCIUM SERPL-MCNC: 8.2 MG/DL (ref 8.6–10.6)
CHLORIDE SERPL-SCNC: 101 MMOL/L (ref 98–107)
CO2 SERPL-SCNC: 25 MMOL/L (ref 21–32)
COLOR UR: ABNORMAL
CREAT SERPL-MCNC: 0.75 MG/DL (ref 0.5–1.05)
EGFRCR SERPLBLD CKD-EPI 2021: >90 ML/MIN/1.73M*2
ERYTHROCYTE [DISTWIDTH] IN BLOOD BY AUTOMATED COUNT: 15.1 % (ref 11.5–14.5)
GLUCOSE SERPL-MCNC: 124 MG/DL (ref 74–99)
GLUCOSE UR STRIP.AUTO-MCNC: NORMAL MG/DL
HCT VFR BLD AUTO: 33 % (ref 36–46)
HGB BLD-MCNC: 10.8 G/DL (ref 12–16)
HOLD SPECIMEN: NORMAL
KETONES UR STRIP.AUTO-MCNC: ABNORMAL MG/DL
LEUKOCYTE ESTERASE UR QL STRIP.AUTO: NEGATIVE
MAGNESIUM SERPL-MCNC: 2.3 MG/DL (ref 1.6–2.4)
MCH RBC QN AUTO: 28.2 PG (ref 26–34)
MCHC RBC AUTO-ENTMCNC: 32.7 G/DL (ref 32–36)
MCV RBC AUTO: 86 FL (ref 80–100)
MUCOUS THREADS #/AREA URNS AUTO: ABNORMAL /LPF
NITRITE UR QL STRIP.AUTO: NEGATIVE
NRBC BLD-RTO: 0 /100 WBCS (ref 0–0)
PH UR STRIP.AUTO: 8 [PH]
PLATELET # BLD AUTO: 335 X10*3/UL (ref 150–450)
POTASSIUM SERPL-SCNC: 3.6 MMOL/L (ref 3.5–5.3)
PROT UR STRIP.AUTO-MCNC: ABNORMAL MG/DL
RBC # BLD AUTO: 3.83 X10*6/UL (ref 4–5.2)
RBC # UR STRIP.AUTO: ABNORMAL /UL
RBC #/AREA URNS AUTO: ABNORMAL /HPF
SODIUM SERPL-SCNC: 137 MMOL/L (ref 136–145)
SP GR UR STRIP.AUTO: 1.03
UROBILINOGEN UR STRIP.AUTO-MCNC: NORMAL MG/DL
WBC # BLD AUTO: 13 X10*3/UL (ref 4.4–11.3)
WBC #/AREA URNS AUTO: ABNORMAL /HPF

## 2024-03-05 PROCEDURE — 2500000004 HC RX 250 GENERAL PHARMACY W/ HCPCS (ALT 636 FOR OP/ED): Performed by: NURSE PRACTITIONER

## 2024-03-05 PROCEDURE — 85027 COMPLETE CBC AUTOMATED: CPT | Performed by: STUDENT IN AN ORGANIZED HEALTH CARE EDUCATION/TRAINING PROGRAM

## 2024-03-05 PROCEDURE — 71045 X-RAY EXAM CHEST 1 VIEW: CPT

## 2024-03-05 PROCEDURE — 2500000004 HC RX 250 GENERAL PHARMACY W/ HCPCS (ALT 636 FOR OP/ED): Performed by: STUDENT IN AN ORGANIZED HEALTH CARE EDUCATION/TRAINING PROGRAM

## 2024-03-05 PROCEDURE — 2500000004 HC RX 250 GENERAL PHARMACY W/ HCPCS (ALT 636 FOR OP/ED): Performed by: PHYSICIAN ASSISTANT

## 2024-03-05 PROCEDURE — 83735 ASSAY OF MAGNESIUM: CPT | Performed by: STUDENT IN AN ORGANIZED HEALTH CARE EDUCATION/TRAINING PROGRAM

## 2024-03-05 PROCEDURE — 80048 BASIC METABOLIC PNL TOTAL CA: CPT | Performed by: STUDENT IN AN ORGANIZED HEALTH CARE EDUCATION/TRAINING PROGRAM

## 2024-03-05 PROCEDURE — 2500000001 HC RX 250 WO HCPCS SELF ADMINISTERED DRUGS (ALT 637 FOR MEDICARE OP): Performed by: STUDENT IN AN ORGANIZED HEALTH CARE EDUCATION/TRAINING PROGRAM

## 2024-03-05 PROCEDURE — C9113 INJ PANTOPRAZOLE SODIUM, VIA: HCPCS | Performed by: NURSE PRACTITIONER

## 2024-03-05 PROCEDURE — 1200000002 HC GENERAL ROOM WITH TELEMETRY DAILY

## 2024-03-05 PROCEDURE — 36415 COLL VENOUS BLD VENIPUNCTURE: CPT | Performed by: STUDENT IN AN ORGANIZED HEALTH CARE EDUCATION/TRAINING PROGRAM

## 2024-03-05 PROCEDURE — 71045 X-RAY EXAM CHEST 1 VIEW: CPT | Performed by: RADIOLOGY

## 2024-03-05 RX ORDER — METHOCARBAMOL 100 MG/ML
1000 INJECTION, SOLUTION INTRAMUSCULAR; INTRAVENOUS EVERY 8 HOURS PRN
Status: DISCONTINUED | OUTPATIENT
Start: 2024-03-05 | End: 2024-03-11 | Stop reason: HOSPADM

## 2024-03-05 RX ORDER — ACETAMINOPHEN 10 MG/ML
1000 INJECTION, SOLUTION INTRAVENOUS EVERY 6 HOURS SCHEDULED
Status: COMPLETED | OUTPATIENT
Start: 2024-03-05 | End: 2024-03-07

## 2024-03-05 RX ORDER — KETOROLAC TROMETHAMINE 15 MG/ML
15 INJECTION, SOLUTION INTRAMUSCULAR; INTRAVENOUS EVERY 6 HOURS SCHEDULED
Status: COMPLETED | OUTPATIENT
Start: 2024-03-05 | End: 2024-03-08

## 2024-03-05 RX ORDER — DEXTROSE MONOHYDRATE, SODIUM CHLORIDE, AND POTASSIUM CHLORIDE 50; 1.49; 4.5 G/1000ML; G/1000ML; G/1000ML
50 INJECTION, SOLUTION INTRAVENOUS CONTINUOUS
Status: DISCONTINUED | OUTPATIENT
Start: 2024-03-05 | End: 2024-03-10

## 2024-03-05 RX ORDER — METHOCARBAMOL 100 MG/ML
1000 INJECTION, SOLUTION INTRAMUSCULAR; INTRAVENOUS EVERY 6 HOURS PRN
OUTPATIENT
Start: 2024-03-05

## 2024-03-05 RX ORDER — ACETAMINOPHEN 10 MG/ML
1000 INJECTION, SOLUTION INTRAVENOUS EVERY 6 HOURS SCHEDULED
Status: DISCONTINUED | OUTPATIENT
Start: 2024-03-05 | End: 2024-03-05

## 2024-03-05 RX ADMIN — KETOROLAC TROMETHAMINE 15 MG: 15 INJECTION, SOLUTION INTRAMUSCULAR; INTRAVENOUS at 07:46

## 2024-03-05 RX ADMIN — HEPARIN SODIUM 5000 UNITS: 5000 INJECTION INTRAVENOUS; SUBCUTANEOUS at 01:12

## 2024-03-05 RX ADMIN — KETOROLAC TROMETHAMINE 15 MG: 15 INJECTION, SOLUTION INTRAMUSCULAR; INTRAVENOUS at 12:37

## 2024-03-05 RX ADMIN — ACETAMINOPHEN 1000 MG: 10 INJECTION INTRAVENOUS at 22:12

## 2024-03-05 RX ADMIN — ACETAMINOPHEN 1000 MG: 10 INJECTION INTRAVENOUS at 08:44

## 2024-03-05 RX ADMIN — Medication 1 SPRAY: at 15:11

## 2024-03-05 RX ADMIN — PIPERACILLIN SODIUM AND TAZOBACTAM SODIUM 3.38 G: 3; .375 INJECTION, SOLUTION INTRAVENOUS at 03:12

## 2024-03-05 RX ADMIN — PANTOPRAZOLE SODIUM 40 MG: 40 INJECTION, POWDER, FOR SOLUTION INTRAVENOUS at 08:46

## 2024-03-05 RX ADMIN — ACETAMINOPHEN 1000 MG: 10 INJECTION INTRAVENOUS at 15:07

## 2024-03-05 RX ADMIN — POTASSIUM CHLORIDE, DEXTROSE MONOHYDRATE AND SODIUM CHLORIDE 100 ML/HR: 150; 5; 450 INJECTION, SOLUTION INTRAVENOUS at 07:00

## 2024-03-05 RX ADMIN — HEPARIN SODIUM 5000 UNITS: 5000 INJECTION INTRAVENOUS; SUBCUTANEOUS at 08:47

## 2024-03-05 RX ADMIN — KETOROLAC TROMETHAMINE 15 MG: 15 INJECTION, SOLUTION INTRAMUSCULAR; INTRAVENOUS at 18:27

## 2024-03-05 RX ADMIN — ONDANSETRON 4 MG: 2 INJECTION INTRAMUSCULAR; INTRAVENOUS at 19:43

## 2024-03-05 RX ADMIN — POTASSIUM CHLORIDE, DEXTROSE MONOHYDRATE AND SODIUM CHLORIDE 100 ML/HR: 150; 5; 450 INJECTION, SOLUTION INTRAVENOUS at 20:00

## 2024-03-05 RX ADMIN — POTASSIUM CHLORIDE, DEXTROSE MONOHYDRATE AND SODIUM CHLORIDE 100 ML/HR: 150; 5; 450 INJECTION, SOLUTION INTRAVENOUS at 07:47

## 2024-03-05 RX ADMIN — PIPERACILLIN SODIUM AND TAZOBACTAM SODIUM 3.38 G: 3; .375 INJECTION, SOLUTION INTRAVENOUS at 22:46

## 2024-03-05 RX ADMIN — PIPERACILLIN SODIUM AND TAZOBACTAM SODIUM 3.38 G: 3; .375 INJECTION, SOLUTION INTRAVENOUS at 17:19

## 2024-03-05 RX ADMIN — HEPARIN SODIUM 5000 UNITS: 5000 INJECTION INTRAVENOUS; SUBCUTANEOUS at 17:19

## 2024-03-05 RX ADMIN — PIPERACILLIN SODIUM AND TAZOBACTAM SODIUM 3.38 G: 3; .375 INJECTION, SOLUTION INTRAVENOUS at 10:34

## 2024-03-05 RX ADMIN — METHOCARBAMOL 1000 MG: 100 INJECTION, SOLUTION INTRAMUSCULAR; INTRAVENOUS at 03:12

## 2024-03-05 RX ADMIN — ACETAMINOPHEN 1000 MG: 10 INJECTION INTRAVENOUS at 01:12

## 2024-03-05 RX ADMIN — METHOCARBAMOL 1000 MG: 100 INJECTION, SOLUTION INTRAMUSCULAR; INTRAVENOUS at 12:41

## 2024-03-05 ASSESSMENT — PAIN - FUNCTIONAL ASSESSMENT
PAIN_FUNCTIONAL_ASSESSMENT: 0-10

## 2024-03-05 ASSESSMENT — COGNITIVE AND FUNCTIONAL STATUS - GENERAL
MOBILITY SCORE: 24
DAILY ACTIVITIY SCORE: 24

## 2024-03-05 ASSESSMENT — PAIN SCALES - GENERAL
PAINLEVEL_OUTOF10: 6
PAINLEVEL_OUTOF10: 3
PAINLEVEL_OUTOF10: 5 - MODERATE PAIN
PAINLEVEL_OUTOF10: 4
PAINLEVEL_OUTOF10: 3
PAINLEVEL_OUTOF10: 6
PAINLEVEL_OUTOF10: 8
PAINLEVEL_OUTOF10: 6

## 2024-03-05 ASSESSMENT — PAIN DESCRIPTION - DESCRIPTORS
DESCRIPTORS: ACHING;DISCOMFORT
DESCRIPTORS: ACHING;DISCOMFORT
DESCRIPTORS: CRAMPING;SPASM

## 2024-03-05 NOTE — PROGRESS NOTES
"Thoracic Surgery Progress Note  3/5/2024    Jewel Rodriguez is a 34 y.o. female with history of VATs esophagectomy for achalasia who presented acutely with high grade closed loop small bowel obstruction secondary to large hiatal hernia now 1 Day Post-Op status post exploratory laparotomy, lysis of adhesions, para-conduit hiatal hernia repair without mesh, and EGD.    Overnight issues: No acute events overnight. Had some reports of increased pain that has improved this AM. Has not been out of bed yet. Denies nausea. Has not passed flatus.     Physical Exam:  General: She is a pleasant female currently in no distress.  /70 (BP Location: Left arm, Patient Position: Lying)   Pulse 81   Temp 36.5 °C (97.7 °F) (Temporal)   Resp 17   Ht 1.676 m (5' 6\")   Wt 66.5 kg (146 lb 9.7 oz)   SpO2 96%   BMI 23.66 kg/m²    Body mass index is 23.66 kg/m².   HEENT: Normocephalic and atraumatic.   NECK: Supple. Trach midline. No JVD.   CHEST: Breathing comfortably on room air, Lungs clear bilaterally, Left Chest tube intact, not kinked, with serosanguinous output  HEART: Regular rate and rhythm.   ABDOMEN: Soft, flat, nontender. Midline dressing intact with scant saturation. Abdominal drain intact with serosanguinous output  : arenas in place with clear yellow urine  NEUROLOGIC: Alert and oriented. Grossly intact.   EXTREMITIES: Moves all extremities equally.  Pedal pulses are palpable. No lower extremity edema. No calf tenderness.       Diagnostics:   Visit Vitals  /70 (BP Location: Left arm, Patient Position: Lying)   Pulse 81   Temp 36.5 °C (97.7 °F) (Temporal)   Resp 17   Ht 1.676 m (5' 6\")   Wt 66.5 kg (146 lb 9.7 oz)   SpO2 96%   BMI 23.66 kg/m²   Smoking Status Never   BSA 1.76 m²       Intake/Output Summary (Last 24 hours) at 3/5/2024 0651  Last data filed at 3/5/2024 0600  Gross per 24 hour   Intake 1100 ml   Output 775 ml   Net 325 ml     @  Results from last 7 days   Lab Units 03/04/24  0647 " 03/03/24  1852   WBC AUTO x10*3/uL 19.8* 10.9   HEMOGLOBIN g/dL 12.4 12.5   HEMATOCRIT % 37.9 37.8   PLATELETS AUTO x10*3/uL 209 310     Results from last 7 days   Lab Units 03/04/24  0705 03/03/24  1852   SODIUM mmol/L 137 137   POTASSIUM mmol/L 4.4 3.6   CHLORIDE mmol/L 103 103   CO2 mmol/L 24 16*   BUN mg/dL 9 8   CREATININE mg/dL 0.66 0.75   GLUCOSE mg/dL 170* 140*   CALCIUM mg/dL 8.5* 9.7     Results from last 7 days   Lab Units 03/04/24  0705 03/03/24  1852   SODIUM mmol/L 137 137   POTASSIUM mmol/L 4.4 3.6   CHLORIDE mmol/L 103 103   CO2 mmol/L 24 16*   BUN mg/dL 9 8   CREATININE mg/dL 0.66 0.75   GLUCOSE mg/dL 170* 140*   CALCIUM mg/dL 8.5* 9.7     Scheduled medications  acetaminophen, 1,000 mg, intravenous, q6h ROSE MARIE  heparin, 5,000 Units, subcutaneous, q8h  oxygen, , inhalation, Continuous - 02/gases  pantoprazole, 40 mg, intravenous, Daily  piperacillin-tazobactam, 3.375 g, intravenous, q6h      Continuous medications  potassium vyrxvsy-O2-0.45%NaCl, 100 mL/hr  HYDROmorphone,       PRN medications  PRN medications: albuterol, ketorolac, methocarbamol, naloxone, naloxone, ondansetron ODT **OR** ondansetron, phenoL    Imaging:   AM CXR reviewed. Expected post op changes and chest tube placement. No clinically significant pneumothorax. No formal report at this time.       Assessment:  Jewel Rodriguez is a 34 y.o. female status post exploratory laparotomy, lysis of adhesions, para-conduit hiatal hernia repair without mesh, and EGD on 3/4/24    Plan:  Neurology: post operative pain  -Continue PCA pump  -Continue oral regimen of pain control with oxycodone and Tylenol   -Out of bed to the chair throughout the day  -Encourage ambulation as tolerated    Cardiovascular:   -Continue telemetry  -Vital signs every four hours  -Replace electrolytes as needed for K >4, Mg >2    Pulmonology: chest tube management  -Encourage incentive spirometer use every hour  -Continue pulmonary hygiene  -Place chest tube to  waterseal  -Obtain daily CXR  -Monitor and record chest tube drainage every shift    Gastrointestinal:   - NPO with NGT in place  - Monitor NG output  - Zofran available for nausea  - Await return of bowel function  - Continue abdominal drain, monitor output    Genitourinary:   -Remove arenas  -Await spontaneous void trial  -Continue to monitor daily electrolytes with routine BMPs   -Adequate urine output    Infectious Disease:   -Continue to trend daily temperatures and WBC count to monitor for signs of post-operative infection   -Monitor surgical incisions for signs of infection   -Continue IV Zosyn    Hematology:   -Monitor for signs of acute blood loss  -Trend CBCs     Endocrine:   - Blood glucose stable, no changes at this time    DVT Prophylaxis:   -Continue subcutaneous heparin and SCDs, early ambulation    Disposition:  -Plan for discharge to home when medically ready  -Likely no home-going needs    Patient seen and examined by this provider. Plan of care discussed with Dr. Rolf Horn, DO - PGY3  General Surgery

## 2024-03-06 ENCOUNTER — APPOINTMENT (OUTPATIENT)
Dept: RADIOLOGY | Facility: HOSPITAL | Age: 34
End: 2024-03-06
Payer: MEDICAID

## 2024-03-06 LAB
ANION GAP SERPL CALC-SCNC: 12 MMOL/L (ref 10–20)
ATRIAL RATE: 84 BPM
BUN SERPL-MCNC: 7 MG/DL (ref 6–23)
CALCIUM SERPL-MCNC: 7.9 MG/DL (ref 8.6–10.6)
CHLORIDE SERPL-SCNC: 101 MMOL/L (ref 98–107)
CO2 SERPL-SCNC: 27 MMOL/L (ref 21–32)
CREAT SERPL-MCNC: 0.66 MG/DL (ref 0.5–1.05)
EGFRCR SERPLBLD CKD-EPI 2021: >90 ML/MIN/1.73M*2
ERYTHROCYTE [DISTWIDTH] IN BLOOD BY AUTOMATED COUNT: 14.6 % (ref 11.5–14.5)
GLUCOSE SERPL-MCNC: 103 MG/DL (ref 74–99)
HCT VFR BLD AUTO: 30.9 % (ref 36–46)
HGB BLD-MCNC: 9.3 G/DL (ref 12–16)
MAGNESIUM SERPL-MCNC: 1.99 MG/DL (ref 1.6–2.4)
MCH RBC QN AUTO: 26.8 PG (ref 26–34)
MCHC RBC AUTO-ENTMCNC: 30.1 G/DL (ref 32–36)
MCV RBC AUTO: 89 FL (ref 80–100)
NRBC BLD-RTO: 0 /100 WBCS (ref 0–0)
P AXIS: 67 DEGREES
P OFFSET: 208 MS
P ONSET: 155 MS
PLATELET # BLD AUTO: 256 X10*3/UL (ref 150–450)
POTASSIUM SERPL-SCNC: 3.3 MMOL/L (ref 3.5–5.3)
PR INTERVAL: 134 MS
Q ONSET: 222 MS
QRS COUNT: 14 BEATS
QRS DURATION: 82 MS
QT INTERVAL: 384 MS
QTC CALCULATION(BAZETT): 453 MS
QTC FREDERICIA: 429 MS
R AXIS: 29 DEGREES
RBC # BLD AUTO: 3.47 X10*6/UL (ref 4–5.2)
SODIUM SERPL-SCNC: 137 MMOL/L (ref 136–145)
T AXIS: 36 DEGREES
T OFFSET: 414 MS
VENTRICULAR RATE: 84 BPM
WBC # BLD AUTO: 9.5 X10*3/UL (ref 4.4–11.3)

## 2024-03-06 PROCEDURE — 85027 COMPLETE CBC AUTOMATED: CPT | Performed by: STUDENT IN AN ORGANIZED HEALTH CARE EDUCATION/TRAINING PROGRAM

## 2024-03-06 PROCEDURE — 2500000004 HC RX 250 GENERAL PHARMACY W/ HCPCS (ALT 636 FOR OP/ED): Performed by: PHYSICIAN ASSISTANT

## 2024-03-06 PROCEDURE — 71045 X-RAY EXAM CHEST 1 VIEW: CPT

## 2024-03-06 PROCEDURE — 99232 SBSQ HOSP IP/OBS MODERATE 35: CPT | Performed by: PHYSICIAN ASSISTANT

## 2024-03-06 PROCEDURE — 36415 COLL VENOUS BLD VENIPUNCTURE: CPT | Performed by: STUDENT IN AN ORGANIZED HEALTH CARE EDUCATION/TRAINING PROGRAM

## 2024-03-06 PROCEDURE — 71045 X-RAY EXAM CHEST 1 VIEW: CPT | Performed by: RADIOLOGY

## 2024-03-06 PROCEDURE — 2500000004 HC RX 250 GENERAL PHARMACY W/ HCPCS (ALT 636 FOR OP/ED): Performed by: STUDENT IN AN ORGANIZED HEALTH CARE EDUCATION/TRAINING PROGRAM

## 2024-03-06 PROCEDURE — 82374 ASSAY BLOOD CARBON DIOXIDE: CPT | Performed by: STUDENT IN AN ORGANIZED HEALTH CARE EDUCATION/TRAINING PROGRAM

## 2024-03-06 PROCEDURE — 83735 ASSAY OF MAGNESIUM: CPT | Performed by: STUDENT IN AN ORGANIZED HEALTH CARE EDUCATION/TRAINING PROGRAM

## 2024-03-06 PROCEDURE — 1200000002 HC GENERAL ROOM WITH TELEMETRY DAILY

## 2024-03-06 PROCEDURE — 2500000004 HC RX 250 GENERAL PHARMACY W/ HCPCS (ALT 636 FOR OP/ED): Performed by: NURSE PRACTITIONER

## 2024-03-06 PROCEDURE — C9113 INJ PANTOPRAZOLE SODIUM, VIA: HCPCS | Performed by: NURSE PRACTITIONER

## 2024-03-06 RX ORDER — POTASSIUM CHLORIDE 14.9 MG/ML
20 INJECTION INTRAVENOUS ONCE
Status: COMPLETED | OUTPATIENT
Start: 2024-03-06 | End: 2024-03-06

## 2024-03-06 RX ADMIN — PIPERACILLIN SODIUM AND TAZOBACTAM SODIUM 3.38 G: 3; .375 INJECTION, SOLUTION INTRAVENOUS at 12:04

## 2024-03-06 RX ADMIN — ONDANSETRON 4 MG: 2 INJECTION INTRAMUSCULAR; INTRAVENOUS at 17:57

## 2024-03-06 RX ADMIN — METHOCARBAMOL 1000 MG: 100 INJECTION, SOLUTION INTRAMUSCULAR; INTRAVENOUS at 11:16

## 2024-03-06 RX ADMIN — HEPARIN SODIUM 5000 UNITS: 5000 INJECTION INTRAVENOUS; SUBCUTANEOUS at 00:54

## 2024-03-06 RX ADMIN — KETOROLAC TROMETHAMINE 15 MG: 15 INJECTION, SOLUTION INTRAMUSCULAR; INTRAVENOUS at 12:05

## 2024-03-06 RX ADMIN — PANTOPRAZOLE SODIUM 40 MG: 40 INJECTION, POWDER, FOR SOLUTION INTRAVENOUS at 11:16

## 2024-03-06 RX ADMIN — ACETAMINOPHEN 1000 MG: 10 INJECTION INTRAVENOUS at 17:51

## 2024-03-06 RX ADMIN — PIPERACILLIN SODIUM AND TAZOBACTAM SODIUM 3.38 G: 3; .375 INJECTION, SOLUTION INTRAVENOUS at 18:47

## 2024-03-06 RX ADMIN — KETOROLAC TROMETHAMINE 15 MG: 15 INJECTION, SOLUTION INTRAMUSCULAR; INTRAVENOUS at 17:58

## 2024-03-06 RX ADMIN — POTASSIUM CHLORIDE 20 MEQ: 14.9 INJECTION, SOLUTION INTRAVENOUS at 13:52

## 2024-03-06 RX ADMIN — HEPARIN SODIUM 5000 UNITS: 5000 INJECTION INTRAVENOUS; SUBCUTANEOUS at 17:58

## 2024-03-06 RX ADMIN — Medication: at 09:16

## 2024-03-06 RX ADMIN — HEPARIN SODIUM 5000 UNITS: 5000 INJECTION INTRAVENOUS; SUBCUTANEOUS at 10:00

## 2024-03-06 RX ADMIN — POTASSIUM CHLORIDE, DEXTROSE MONOHYDRATE AND SODIUM CHLORIDE 100 ML/HR: 150; 5; 450 INJECTION, SOLUTION INTRAVENOUS at 17:46

## 2024-03-06 RX ADMIN — KETOROLAC TROMETHAMINE 15 MG: 15 INJECTION, SOLUTION INTRAMUSCULAR; INTRAVENOUS at 00:56

## 2024-03-06 RX ADMIN — KETOROLAC TROMETHAMINE 15 MG: 15 INJECTION, SOLUTION INTRAMUSCULAR; INTRAVENOUS at 06:44

## 2024-03-06 RX ADMIN — PIPERACILLIN SODIUM AND TAZOBACTAM SODIUM 3.38 G: 3; .375 INJECTION, SOLUTION INTRAVENOUS at 04:35

## 2024-03-06 RX ADMIN — ACETAMINOPHEN 1000 MG: 10 INJECTION INTRAVENOUS at 23:50

## 2024-03-06 RX ADMIN — ACETAMINOPHEN 1000 MG: 10 INJECTION INTRAVENOUS at 03:55

## 2024-03-06 RX ADMIN — ACETAMINOPHEN 1000 MG: 10 INJECTION INTRAVENOUS at 10:00

## 2024-03-06 ASSESSMENT — COGNITIVE AND FUNCTIONAL STATUS - GENERAL
DAILY ACTIVITIY SCORE: 24
MOBILITY SCORE: 24
DAILY ACTIVITIY SCORE: 24
MOBILITY SCORE: 24

## 2024-03-06 ASSESSMENT — PAIN - FUNCTIONAL ASSESSMENT
PAIN_FUNCTIONAL_ASSESSMENT: 0-10

## 2024-03-06 ASSESSMENT — ACTIVITIES OF DAILY LIVING (ADL): LACK_OF_TRANSPORTATION: NO

## 2024-03-06 ASSESSMENT — PAIN SCALES - GENERAL
PAINLEVEL_OUTOF10: 5 - MODERATE PAIN
PAINLEVEL_OUTOF10: 6
PAINLEVEL_OUTOF10: 5 - MODERATE PAIN

## 2024-03-06 NOTE — PROGRESS NOTES
"Thoracic Surgery Progress Note  3/6/2024    Jewel Rodriguez is a 34 y.o. female with history of VATs esophagectomy for achalasia who presented acutely with high grade closed loop small bowel obstruction secondary to large hiatal hernia now 2 Days Post-Op status post exploratory laparotomy, lysis of adhesions, para-conduit hiatal hernia repair without mesh, and EGD.    Overnight issues: No acute events overnight. Had some nausea yesterday which has resolved. Has been ambulatory. Pain is minimal and mostly localized to surrounding CT.     Physical Exam:  General: She is a pleasant female currently in no distress.  /70 (BP Location: Right arm, Patient Position: Lying)   Pulse 71   Temp 36.6 °C (97.9 °F) (Temporal)   Resp 18   Ht 1.676 m (5' 6\")   Wt 66.7 kg (147 lb)   SpO2 98%   BMI 23.73 kg/m²    Body mass index is 23.73 kg/m².   HEENT: Normocephalic and atraumatic.   NECK: Supple. Trach midline. No JVD.   CHEST: Breathing comfortably on room air, Lungs clear bilaterally, Left Chest tube with serosanguinous output, no air leak  HEART: Regular rate and rhythm.   ABDOMEN: Soft, flat, nontender. Midline well approximated with staples, no surrounding erythema or drainage. Rufus drain with serosanguinous output  : arenas in place with clear yellow urine  NEUROLOGIC: Alert and oriented. Grossly intact.   EXTREMITIES: Moves all extremities equally.  Pedal pulses are palpable. No lower extremity edema. No calf tenderness.       Diagnostics:   Visit Vitals  /70 (BP Location: Right arm, Patient Position: Lying)   Pulse 71   Temp 36.6 °C (97.9 °F) (Temporal)   Resp 18   Ht 1.676 m (5' 6\")   Wt 66.7 kg (147 lb)   SpO2 98%   BMI 23.73 kg/m²   Smoking Status Never   BSA 1.76 m²       Intake/Output Summary (Last 24 hours) at 3/6/2024 0926  Last data filed at 3/6/2024 0648  Gross per 24 hour   Intake 2380 ml   Output 790 ml   Net 1590 ml       @  Results from last 7 days   Lab Units 03/05/24  0814 " 03/04/24  0647 03/03/24  1852   WBC AUTO x10*3/uL 13.0* 19.8* 10.9   HEMOGLOBIN g/dL 10.8* 12.4 12.5   HEMATOCRIT % 33.0* 37.9 37.8   PLATELETS AUTO x10*3/uL 335 209 310       Results from last 7 days   Lab Units 03/05/24  0814 03/04/24  0705 03/03/24  1852   SODIUM mmol/L 137 137 137   POTASSIUM mmol/L 3.6 4.4 3.6   CHLORIDE mmol/L 101 103 103   CO2 mmol/L 25 24 16*   BUN mg/dL 8 9 8   CREATININE mg/dL 0.75 0.66 0.75   GLUCOSE mg/dL 124* 170* 140*   CALCIUM mg/dL 8.2* 8.5* 9.7       Results from last 7 days   Lab Units 03/05/24  0814 03/04/24  0705 03/03/24  1852   SODIUM mmol/L 137 137 137   POTASSIUM mmol/L 3.6 4.4 3.6   CHLORIDE mmol/L 101 103 103   CO2 mmol/L 25 24 16*   BUN mg/dL 8 9 8   CREATININE mg/dL 0.75 0.66 0.75   GLUCOSE mg/dL 124* 170* 140*   CALCIUM mg/dL 8.2* 8.5* 9.7       Scheduled medications  acetaminophen, 1,000 mg, intravenous, q6h ROSE MARIE  heparin, 5,000 Units, subcutaneous, q8h  ketorolac, 15 mg, intravenous, q6h [facility]  oxygen, , inhalation, Continuous - 02/gases  pantoprazole, 40 mg, intravenous, Daily  piperacillin-tazobactam, 3.375 g, intravenous, q6h  potassium chloride, 20 mEq, intravenous, Once      Continuous medications  potassium dvetqxy-K2-1.45%NaCl, 100 mL/hr, Last Rate: 100 mL/hr (03/06/24 0648)  HYDROmorphone,       PRN medications  PRN medications: albuterol, methocarbamol, naloxone, naloxone, ondansetron ODT **OR** ondansetron, phenoL    Imaging:   AM CXR reviewed. Expected post op changes and chest tube placement. No clinically significant pneumothorax. No formal report at this time.       Assessment:  Jewel Rodriguez is a 34 y.o. female status post exploratory laparotomy, lysis of adhesions, para-conduit hiatal hernia repair without mesh, and EGD on 3/4/24    Plan:  Neurology: post operative pain  -Continue PCA pump  -Continue IV acetaminophen, IV robaxin   -Out of bed to the chair throughout the day  -Encourage ambulation as tolerated    Cardiovascular:   -Continue  telemetry  -Vital signs every four hours  -Replace electrolytes as needed for K >4, Mg >2, replace potassium today    Pulmonology: chest tube removed 3/6/24  -Encourage incentive spirometer use every hour  -Continue pulmonary hygiene  -Nebs as needed   -Chest tube removed without complication, awaiting post CXR   -Obtain daily CXR    Gastrointestinal: Achalasia s/p remote h/o esophagectomy presented with large herniation of colon and bowel through hiatus s/p repair  - NPO with NGT in place  - Monitor NG output, withdrawn from 55cm to 50cm at the nare 3/6  - Zofran available for nausea  -IVF D5 1/2NS, 20mg k+ at 100ml/hr   - Await return of bowel function, no flatus. No nausea.   - Continue abdominal drain, monitor output    Genitourinary:   -Continue to monitor daily electrolytes with routine BMPs   -Adequate urine output    Infectious Disease:   -Continue to trend daily temperatures and WBC count to monitor for signs of post-operative infection, trending down today   -Monitor surgical incisions for signs of infection   -Continue IV Zosyn     Hematology:   -Monitor for signs of acute blood loss  -Trend CBCs     Endocrine:   - Blood glucose stable, no changes at this time    DVT Prophylaxis:   -Continue subcutaneous heparin and SCDs, early ambulation    Disposition:  -Plan for discharge to home when medically ready  -Likely no home-going needs    Patient seen and examined by this provider. Plan of care discussed with Dr. Bansal

## 2024-03-06 NOTE — CARE PLAN
Problem: Pain  Goal: My pain/discomfort is manageable  Outcome: Progressing     Problem: Daily Care  Goal: Daily care needs are met  Outcome: Progressing     Problem: Psychosocial Needs  Goal: Demonstrates ability to cope with hospitalization/illness  Outcome: Progressing  Goal: Collaborate with me, my family, and caregiver to identify my specific goals  Outcome: Progressing   The patient's goals for the shift include      The clinical goals for the shift include Pt will have pain control within this shift

## 2024-03-06 NOTE — PROGRESS NOTES
"Jewel Rodriguez is a 34 y.o. female on day 2 of admission presenting with Hiatal hernia with obstruction but no gangrene.  Transitional Care Coordination Progress Note:   Patient discussed during interdisciplinary rounds.   Team members present: (TCC, Thoracic-PA   Plan per Medical/Surgical team: (Thoracic surgery intervention- NG- elevated WBC's)   Discharge disposition: (Home no needs)   Status- In patient   Payer- [facility]  Potential Barriers: (NG removal, chest tube removal, resolution of N/A)   ADOD: (1-2 dasy)   .Delia Segura RN [facility] [phone]       Physical Exam    Last Recorded Vitals  Blood pressure 115/76, pulse 75, temperature 36.7 °C (98.1 °F), temperature source Temporal, resp. rate 17, height 1.676 m (5' 6\"), weight 66.7 kg (147 lb), SpO2 98 %.  Intake/Output last 3 Shifts:  I/O last 3 completed shifts:  In: 3380 (50.7 mL/kg) [I.V.:1000 (15 mL/kg); IV Piggyback:2380]  Out: 1800 (27 mL/kg) [Urine:550 (0.2 mL/kg/hr); Emesis/NG output:650; Drains:290; Chest Tube:310]  Weight: 66.7 kg         Assessment/Plan   Principal Problem:    Hiatal hernia with obstruction but no gangrene              Delia Segura RN      "

## 2024-03-07 ENCOUNTER — APPOINTMENT (OUTPATIENT)
Dept: RADIOLOGY | Facility: HOSPITAL | Age: 34
End: 2024-03-07
Payer: MEDICAID

## 2024-03-07 LAB
ANION GAP SERPL CALC-SCNC: 12 MMOL/L (ref 10–20)
BUN SERPL-MCNC: 6 MG/DL (ref 6–23)
CALCIUM SERPL-MCNC: 8.1 MG/DL (ref 8.6–10.6)
CHLORIDE SERPL-SCNC: 104 MMOL/L (ref 98–107)
CO2 SERPL-SCNC: 24 MMOL/L (ref 21–32)
CREAT SERPL-MCNC: 0.61 MG/DL (ref 0.5–1.05)
EGFRCR SERPLBLD CKD-EPI 2021: >90 ML/MIN/1.73M*2
ERYTHROCYTE [DISTWIDTH] IN BLOOD BY AUTOMATED COUNT: 14.2 % (ref 11.5–14.5)
GLUCOSE SERPL-MCNC: 108 MG/DL (ref 74–99)
HCT VFR BLD AUTO: 31.7 % (ref 36–46)
HGB BLD-MCNC: 10.4 G/DL (ref 12–16)
MAGNESIUM SERPL-MCNC: 1.91 MG/DL (ref 1.6–2.4)
MCH RBC QN AUTO: 27.9 PG (ref 26–34)
MCHC RBC AUTO-ENTMCNC: 32.8 G/DL (ref 32–36)
MCV RBC AUTO: 85 FL (ref 80–100)
NRBC BLD-RTO: 0 /100 WBCS (ref 0–0)
PLATELET # BLD AUTO: 289 X10*3/UL (ref 150–450)
POTASSIUM SERPL-SCNC: 3.7 MMOL/L (ref 3.5–5.3)
RBC # BLD AUTO: 3.73 X10*6/UL (ref 4–5.2)
SODIUM SERPL-SCNC: 136 MMOL/L (ref 136–145)
WBC # BLD AUTO: 10.1 X10*3/UL (ref 4.4–11.3)

## 2024-03-07 PROCEDURE — 2500000004 HC RX 250 GENERAL PHARMACY W/ HCPCS (ALT 636 FOR OP/ED): Performed by: STUDENT IN AN ORGANIZED HEALTH CARE EDUCATION/TRAINING PROGRAM

## 2024-03-07 PROCEDURE — 99232 SBSQ HOSP IP/OBS MODERATE 35: CPT | Performed by: NURSE PRACTITIONER

## 2024-03-07 PROCEDURE — 2500000004 HC RX 250 GENERAL PHARMACY W/ HCPCS (ALT 636 FOR OP/ED): Performed by: NURSE PRACTITIONER

## 2024-03-07 PROCEDURE — 36415 COLL VENOUS BLD VENIPUNCTURE: CPT | Performed by: STUDENT IN AN ORGANIZED HEALTH CARE EDUCATION/TRAINING PROGRAM

## 2024-03-07 PROCEDURE — 80048 BASIC METABOLIC PNL TOTAL CA: CPT | Performed by: STUDENT IN AN ORGANIZED HEALTH CARE EDUCATION/TRAINING PROGRAM

## 2024-03-07 PROCEDURE — 71045 X-RAY EXAM CHEST 1 VIEW: CPT | Performed by: RADIOLOGY

## 2024-03-07 PROCEDURE — 1200000002 HC GENERAL ROOM WITH TELEMETRY DAILY

## 2024-03-07 PROCEDURE — 85027 COMPLETE CBC AUTOMATED: CPT | Performed by: STUDENT IN AN ORGANIZED HEALTH CARE EDUCATION/TRAINING PROGRAM

## 2024-03-07 PROCEDURE — 71045 X-RAY EXAM CHEST 1 VIEW: CPT

## 2024-03-07 PROCEDURE — C9113 INJ PANTOPRAZOLE SODIUM, VIA: HCPCS | Performed by: NURSE PRACTITIONER

## 2024-03-07 PROCEDURE — 83735 ASSAY OF MAGNESIUM: CPT | Performed by: STUDENT IN AN ORGANIZED HEALTH CARE EDUCATION/TRAINING PROGRAM

## 2024-03-07 PROCEDURE — 2500000004 HC RX 250 GENERAL PHARMACY W/ HCPCS (ALT 636 FOR OP/ED): Performed by: PHYSICIAN ASSISTANT

## 2024-03-07 RX ORDER — SCOLOPAMINE TRANSDERMAL SYSTEM 1 MG/1
1 PATCH, EXTENDED RELEASE TRANSDERMAL
Status: DISCONTINUED | OUTPATIENT
Start: 2024-03-07 | End: 2024-03-11 | Stop reason: HOSPADM

## 2024-03-07 RX ORDER — POTASSIUM CHLORIDE 14.9 MG/ML
20 INJECTION INTRAVENOUS ONCE
Status: COMPLETED | OUTPATIENT
Start: 2024-03-07 | End: 2024-03-07

## 2024-03-07 RX ORDER — METOCLOPRAMIDE HYDROCHLORIDE 5 MG/ML
10 INJECTION INTRAMUSCULAR; INTRAVENOUS EVERY 6 HOURS SCHEDULED
Status: DISCONTINUED | OUTPATIENT
Start: 2024-03-07 | End: 2024-03-07

## 2024-03-07 RX ORDER — ACETAMINOPHEN 10 MG/ML
1000 INJECTION, SOLUTION INTRAVENOUS EVERY 6 HOURS SCHEDULED
Status: DISCONTINUED | OUTPATIENT
Start: 2024-03-07 | End: 2024-03-09

## 2024-03-07 RX ORDER — MAGNESIUM SULFATE HEPTAHYDRATE 40 MG/ML
2 INJECTION, SOLUTION INTRAVENOUS ONCE
Status: COMPLETED | OUTPATIENT
Start: 2024-03-07 | End: 2024-03-07

## 2024-03-07 RX ADMIN — KETOROLAC TROMETHAMINE 15 MG: 15 INJECTION, SOLUTION INTRAMUSCULAR; INTRAVENOUS at 19:58

## 2024-03-07 RX ADMIN — KETOROLAC TROMETHAMINE 15 MG: 15 INJECTION, SOLUTION INTRAMUSCULAR; INTRAVENOUS at 14:03

## 2024-03-07 RX ADMIN — PANTOPRAZOLE SODIUM 40 MG: 40 INJECTION, POWDER, FOR SOLUTION INTRAVENOUS at 09:52

## 2024-03-07 RX ADMIN — ONDANSETRON 4 MG: 2 INJECTION INTRAMUSCULAR; INTRAVENOUS at 18:27

## 2024-03-07 RX ADMIN — HEPARIN SODIUM 5000 UNITS: 5000 INJECTION INTRAVENOUS; SUBCUTANEOUS at 09:52

## 2024-03-07 RX ADMIN — SCOPOLAMINE 1 PATCH: 1.5 PATCH, EXTENDED RELEASE TRANSDERMAL at 10:00

## 2024-03-07 RX ADMIN — MAGNESIUM SULFATE HEPTAHYDRATE 2 G: 40 INJECTION, SOLUTION INTRAVENOUS at 16:39

## 2024-03-07 RX ADMIN — POTASSIUM CHLORIDE, DEXTROSE MONOHYDRATE AND SODIUM CHLORIDE 100 ML/HR: 150; 5; 450 INJECTION, SOLUTION INTRAVENOUS at 20:57

## 2024-03-07 RX ADMIN — POTASSIUM CHLORIDE, DEXTROSE MONOHYDRATE AND SODIUM CHLORIDE 100 ML/HR: 150; 5; 450 INJECTION, SOLUTION INTRAVENOUS at 03:11

## 2024-03-07 RX ADMIN — PIPERACILLIN SODIUM AND TAZOBACTAM SODIUM 3.38 G: 3; .375 INJECTION, SOLUTION INTRAVENOUS at 20:55

## 2024-03-07 RX ADMIN — PIPERACILLIN SODIUM AND TAZOBACTAM SODIUM 3.38 G: 3; .375 INJECTION, SOLUTION INTRAVENOUS at 14:55

## 2024-03-07 RX ADMIN — ACETAMINOPHEN 1000 MG: 10 INJECTION INTRAVENOUS at 09:52

## 2024-03-07 RX ADMIN — POTASSIUM CHLORIDE, DEXTROSE MONOHYDRATE AND SODIUM CHLORIDE 100 ML/HR: 150; 5; 450 INJECTION, SOLUTION INTRAVENOUS at 13:57

## 2024-03-07 RX ADMIN — ACETAMINOPHEN 1000 MG: 10 INJECTION INTRAVENOUS at 19:58

## 2024-03-07 RX ADMIN — KETOROLAC TROMETHAMINE 15 MG: 15 INJECTION, SOLUTION INTRAMUSCULAR; INTRAVENOUS at 00:44

## 2024-03-07 RX ADMIN — KETOROLAC TROMETHAMINE 15 MG: 15 INJECTION, SOLUTION INTRAMUSCULAR; INTRAVENOUS at 06:43

## 2024-03-07 RX ADMIN — ONDANSETRON 4 MG: 2 INJECTION INTRAMUSCULAR; INTRAVENOUS at 00:18

## 2024-03-07 RX ADMIN — PIPERACILLIN SODIUM AND TAZOBACTAM SODIUM 3.38 G: 3; .375 INJECTION, SOLUTION INTRAVENOUS at 00:14

## 2024-03-07 RX ADMIN — METHOCARBAMOL 1000 MG: 100 INJECTION, SOLUTION INTRAMUSCULAR; INTRAVENOUS at 03:11

## 2024-03-07 RX ADMIN — POTASSIUM CHLORIDE 20 MEQ: 14.9 INJECTION, SOLUTION INTRAVENOUS at 10:26

## 2024-03-07 RX ADMIN — PIPERACILLIN SODIUM AND TAZOBACTAM SODIUM 3.38 G: 3; .375 INJECTION, SOLUTION INTRAVENOUS at 06:13

## 2024-03-07 RX ADMIN — ONDANSETRON 4 MG: 2 INJECTION INTRAMUSCULAR; INTRAVENOUS at 09:51

## 2024-03-07 RX ADMIN — HEPARIN SODIUM 5000 UNITS: 5000 INJECTION INTRAVENOUS; SUBCUTANEOUS at 00:14

## 2024-03-07 RX ADMIN — HEPARIN SODIUM 5000 UNITS: 5000 INJECTION INTRAVENOUS; SUBCUTANEOUS at 18:26

## 2024-03-07 RX ADMIN — ACETAMINOPHEN 1000 MG: 10 INJECTION INTRAVENOUS at 06:13

## 2024-03-07 RX ADMIN — METHOCARBAMOL 1000 MG: 100 INJECTION, SOLUTION INTRAMUSCULAR; INTRAVENOUS at 18:27

## 2024-03-07 ASSESSMENT — COGNITIVE AND FUNCTIONAL STATUS - GENERAL
MOBILITY SCORE: 24
DAILY ACTIVITIY SCORE: 24

## 2024-03-07 ASSESSMENT — PAIN - FUNCTIONAL ASSESSMENT
PAIN_FUNCTIONAL_ASSESSMENT: 0-10

## 2024-03-07 ASSESSMENT — PAIN SCALES - GENERAL
PAINLEVEL_OUTOF10: 6
PAINLEVEL_OUTOF10: 6
PAINLEVEL_OUTOF10: 7
PAINLEVEL_OUTOF10: 6

## 2024-03-07 ASSESSMENT — PAIN DESCRIPTION - LOCATION: LOCATION: ABDOMEN

## 2024-03-07 NOTE — PROGRESS NOTES
"Thoracic Surgery Progress Note  3/7/2024    Jewel Rodriguez is a 34 y.o. female with history of VATs esophagectomy for achalasia who presented acutely with high grade closed loop small bowel obstruction secondary to large hiatal hernia now 3 Days Post-Op status post exploratory laparotomy, lysis of adhesions, para-conduit hiatal hernia repair without mesh, and EGD.    Overnight issues: NGT pulled back to 50cm at the nares. Increased gastric output from NGT, improved conduit dilation on AM CXR.  Patient with c/o NGT discomfort. Endorses some flatus this am. Otherwise satisfactory post op course.     Physical Exam:  General: She is a pleasant female currently in no distress, resting comfortably in bed.   /85 (BP Location: Left arm, Patient Position: Lying)   Pulse 63   Temp 36.7 °C (98.1 °F) (Temporal)   Resp 18   Ht 1.676 m (5' 6\")   Wt 65.7 kg (144 lb 14.4 oz)   SpO2 97%   BMI 23.39 kg/m²    Body mass index is 23.39 kg/m².   HEENT: Normocephalic and atraumatic. NGT secure in nares. Dark brown gastric output in canister.   NECK: Supple. Trach midline. No JVD.   CHEST: Breathing comfortably on room air  HEART: Regular rate and rhythm.   ABDOMEN: Soft, mildly distended, appropriately tender. Midline well approximated with staples, no surrounding erythema or drainage. Rufus drain with serosanguinous output  : voiding per bathroom   NEUROLOGIC: Alert and oriented. Grossly intact.   EXTREMITIES: Moves all extremities equally.  Pedal pulses are palpable. No lower extremity edema. No calf tenderness.     Diagnostics:   Visit Vitals  /85 (BP Location: Left arm, Patient Position: Lying)   Pulse 63   Temp 36.7 °C (98.1 °F) (Temporal)   Resp 18   Ht 1.676 m (5' 6\")   Wt 65.7 kg (144 lb 14.4 oz)   SpO2 97%   BMI 23.39 kg/m²   Smoking Status Never   BSA 1.75 m²       Intake/Output Summary (Last 24 hours) at 3/7/2024 0757  Last data filed at 3/7/2024 0614  Gross per 24 hour   Intake 2393.34 ml   Output 1200 " ml   Net 1193.34 ml       Results from last 7 days   Lab Units 03/06/24  0954 03/05/24  0814 03/04/24  0647   WBC AUTO x10*3/uL 9.5 13.0* 19.8*   HEMOGLOBIN g/dL 9.3* 10.8* 12.4   HEMATOCRIT % 30.9* 33.0* 37.9   PLATELETS AUTO x10*3/uL 256 335 209       Results from last 7 days   Lab Units 03/06/24  0954 03/05/24  0814 03/04/24  0705   SODIUM mmol/L 137 137 137   POTASSIUM mmol/L 3.3* 3.6 4.4   CHLORIDE mmol/L 101 101 103   CO2 mmol/L 27 25 24   BUN mg/dL 7 8 9   CREATININE mg/dL 0.66 0.75 0.66   GLUCOSE mg/dL 103* 124* 170*   CALCIUM mg/dL 7.9* 8.2* 8.5*       Results from last 7 days   Lab Units 03/06/24  0954 03/05/24  0814 03/04/24  0705   SODIUM mmol/L 137 137 137   POTASSIUM mmol/L 3.3* 3.6 4.4   CHLORIDE mmol/L 101 101 103   CO2 mmol/L 27 25 24   BUN mg/dL 7 8 9   CREATININE mg/dL 0.66 0.75 0.66   GLUCOSE mg/dL 103* 124* 170*   CALCIUM mg/dL 7.9* 8.2* 8.5*       Scheduled medications  heparin, 5,000 Units, subcutaneous, q8h  ketorolac, 15 mg, intravenous, q6h [facility]IE  oxygen, , inhalation, Continuous - 02/gases  pantoprazole, 40 mg, intravenous, Daily  piperacillin-tazobactam, 3.375 g, intravenous, q6h      Continuous medications  potassium xbgifno-K9-1.45%NaCl, 100 mL/hr, Last Rate: 100 mL/hr (03/07/24 0614)  HYDROmorphone,       PRN medications  PRN medications: albuterol, methocarbamol, naloxone, naloxone, ondansetron ODT **OR** ondansetron, phenoL    Imaging:   AM CXR reviewed. Expected post op changes and NGT/corinna drain placement. Improved gastric conduit dilation. No clinically significant pneumothorax. No formal report at this time.     Assessment:  Jewel N M Michael is a 34 y.o. female status post exploratory laparotomy, lysis of adhesions, para-conduit hiatal hernia repair without mesh, and EGD on 3/4/24. Satisfactory post op course.     3/6/2024: Chest tube removed. NGT pulled back to 50 cm.   3/7/2024; some ROBF, but still with high NGT outputs     Plan:  Neurology: post operative pain  -Continue PCA  "pump  -Continue IV acetaminophen, IV robaxin   -Out of bed to the chair throughout the day  -Encourage ambulation as tolerated    Cardiovascular:   -Continue telemetry  -Vital signs every four hours  -Replace electrolytes as needed for K >4, Mg >2, replace potassium today    Pulmonology: chest tube removed 3/6/24  -Encourage incentive spirometer use every hour  -Continue pulmonary hygiene  -Nebs as needed   -Chest tube removed 3/6  -Obtain daily CXR    Gastrointestinal: Achalasia s/p remote h/o esophagectomy presented with large herniation of colon and bowel through hiatus s/p repair. Post op ileus, awaiting ROBF  - NPO with NGT in place  - Monitor NG output, withdrawn from 55cm to 50cm at the nare 3/6  - Zofran available for nausea, phenergan added for 2nd line. Scop patch added today.   - continue IVF D5 1/2NS, 20mg k+ at 100ml/hr   - Await return of bowel function, minimal flatus. No nausea.   - Discontinue abdominal drain 3/7    Genitourinary:   -Continue to monitor daily electrolytes with routine BMPs   -Adequate urine output    Infectious Disease:   -Continue to trend daily temperatures and WBC count to monitor for signs of post-operative infection, trending down today   -Monitor surgical incisions for signs of infection   -Continue IV Zosyn --will discuss duration with Dr. Bansal today.     Hematology:   -Monitor for signs of acute blood loss  -Trend CBCs     Endocrine:   - Blood glucose stable, no changes at this time    DVT Prophylaxis:   -Continue subcutaneous heparin and SCDs, early ambulation    Disposition:  -Plan for discharge to home when medically ready, no MARTY at this time.   -Likely no home-going needs    Patient seen and examined by this provider. Plan of care discussed with Dr. Rolf Tapia, APRN-CNP  Thoracic Surgery Pager [phone]    Addendum:  \"Slight pneumoperitoneum\" CXR findings shared with Dr. Bansal. We will continue to observe and await ROBF.  Zosyn duration to be 4 days from time of " surgery. Last dose Friday AM.

## 2024-03-08 ENCOUNTER — APPOINTMENT (OUTPATIENT)
Dept: RADIOLOGY | Facility: HOSPITAL | Age: 34
End: 2024-03-08
Payer: MEDICAID

## 2024-03-08 LAB
ANION GAP SERPL CALC-SCNC: 14 MMOL/L (ref 10–20)
BUN SERPL-MCNC: 5 MG/DL (ref 6–23)
CALCIUM SERPL-MCNC: 8.2 MG/DL (ref 8.6–10.6)
CHLORIDE SERPL-SCNC: 106 MMOL/L (ref 98–107)
CO2 SERPL-SCNC: 19 MMOL/L (ref 21–32)
CREAT SERPL-MCNC: 0.67 MG/DL (ref 0.5–1.05)
EGFRCR SERPLBLD CKD-EPI 2021: >90 ML/MIN/1.73M*2
ERYTHROCYTE [DISTWIDTH] IN BLOOD BY AUTOMATED COUNT: 13.9 % (ref 11.5–14.5)
GLUCOSE SERPL-MCNC: 115 MG/DL (ref 74–99)
HCT VFR BLD AUTO: 28.9 % (ref 36–46)
HGB BLD-MCNC: 9.9 G/DL (ref 12–16)
MAGNESIUM SERPL-MCNC: 2.04 MG/DL (ref 1.6–2.4)
MCH RBC QN AUTO: 27.7 PG (ref 26–34)
MCHC RBC AUTO-ENTMCNC: 34.3 G/DL (ref 32–36)
MCV RBC AUTO: 81 FL (ref 80–100)
NRBC BLD-RTO: 0 /100 WBCS (ref 0–0)
PLATELET # BLD AUTO: 287 X10*3/UL (ref 150–450)
POTASSIUM SERPL-SCNC: 4 MMOL/L (ref 3.5–5.3)
RBC # BLD AUTO: 3.58 X10*6/UL (ref 4–5.2)
SODIUM SERPL-SCNC: 135 MMOL/L (ref 136–145)
WBC # BLD AUTO: 10.4 X10*3/UL (ref 4.4–11.3)

## 2024-03-08 PROCEDURE — 71045 X-RAY EXAM CHEST 1 VIEW: CPT | Performed by: RADIOLOGY

## 2024-03-08 PROCEDURE — 83735 ASSAY OF MAGNESIUM: CPT | Performed by: STUDENT IN AN ORGANIZED HEALTH CARE EDUCATION/TRAINING PROGRAM

## 2024-03-08 PROCEDURE — 71045 X-RAY EXAM CHEST 1 VIEW: CPT

## 2024-03-08 PROCEDURE — 80048 BASIC METABOLIC PNL TOTAL CA: CPT | Performed by: STUDENT IN AN ORGANIZED HEALTH CARE EDUCATION/TRAINING PROGRAM

## 2024-03-08 PROCEDURE — 2500000004 HC RX 250 GENERAL PHARMACY W/ HCPCS (ALT 636 FOR OP/ED): Performed by: NURSE PRACTITIONER

## 2024-03-08 PROCEDURE — C9113 INJ PANTOPRAZOLE SODIUM, VIA: HCPCS | Performed by: NURSE PRACTITIONER

## 2024-03-08 PROCEDURE — 2500000004 HC RX 250 GENERAL PHARMACY W/ HCPCS (ALT 636 FOR OP/ED): Performed by: PHYSICIAN ASSISTANT

## 2024-03-08 PROCEDURE — 0D9670Z DRAINAGE OF STOMACH WITH DRAINAGE DEVICE, VIA NATURAL OR ARTIFICIAL OPENING: ICD-10-PCS | Performed by: STUDENT IN AN ORGANIZED HEALTH CARE EDUCATION/TRAINING PROGRAM

## 2024-03-08 PROCEDURE — 36415 COLL VENOUS BLD VENIPUNCTURE: CPT | Performed by: STUDENT IN AN ORGANIZED HEALTH CARE EDUCATION/TRAINING PROGRAM

## 2024-03-08 PROCEDURE — 2500000004 HC RX 250 GENERAL PHARMACY W/ HCPCS (ALT 636 FOR OP/ED): Performed by: STUDENT IN AN ORGANIZED HEALTH CARE EDUCATION/TRAINING PROGRAM

## 2024-03-08 PROCEDURE — 85027 COMPLETE CBC AUTOMATED: CPT | Performed by: STUDENT IN AN ORGANIZED HEALTH CARE EDUCATION/TRAINING PROGRAM

## 2024-03-08 PROCEDURE — 0DJ08ZZ INSPECTION OF UPPER INTESTINAL TRACT, VIA NATURAL OR ARTIFICIAL OPENING ENDOSCOPIC: ICD-10-PCS | Performed by: STUDENT IN AN ORGANIZED HEALTH CARE EDUCATION/TRAINING PROGRAM

## 2024-03-08 PROCEDURE — 99232 SBSQ HOSP IP/OBS MODERATE 35: CPT | Performed by: NURSE PRACTITIONER

## 2024-03-08 PROCEDURE — 0BQT0ZZ REPAIR DIAPHRAGM, OPEN APPROACH: ICD-10-PCS | Performed by: STUDENT IN AN ORGANIZED HEALTH CARE EDUCATION/TRAINING PROGRAM

## 2024-03-08 PROCEDURE — 1200000002 HC GENERAL ROOM WITH TELEMETRY DAILY

## 2024-03-08 RX ADMIN — PANTOPRAZOLE SODIUM 40 MG: 40 INJECTION, POWDER, FOR SOLUTION INTRAVENOUS at 08:58

## 2024-03-08 RX ADMIN — KETOROLAC TROMETHAMINE 15 MG: 15 INJECTION, SOLUTION INTRAMUSCULAR; INTRAVENOUS at 08:57

## 2024-03-08 RX ADMIN — ACETAMINOPHEN 1000 MG: 10 INJECTION INTRAVENOUS at 10:18

## 2024-03-08 RX ADMIN — METHOCARBAMOL 1000 MG: 100 INJECTION, SOLUTION INTRAMUSCULAR; INTRAVENOUS at 20:45

## 2024-03-08 RX ADMIN — KETOROLAC TROMETHAMINE 15 MG: 15 INJECTION, SOLUTION INTRAMUSCULAR; INTRAVENOUS at 02:04

## 2024-03-08 RX ADMIN — ACETAMINOPHEN 1000 MG: 10 INJECTION INTRAVENOUS at 02:04

## 2024-03-08 RX ADMIN — PIPERACILLIN SODIUM AND TAZOBACTAM SODIUM 3.38 G: 3; .375 INJECTION, SOLUTION INTRAVENOUS at 03:17

## 2024-03-08 RX ADMIN — HEPARIN SODIUM 5000 UNITS: 5000 INJECTION INTRAVENOUS; SUBCUTANEOUS at 18:25

## 2024-03-08 RX ADMIN — POTASSIUM CHLORIDE, DEXTROSE MONOHYDRATE AND SODIUM CHLORIDE 100 ML/HR: 150; 5; 450 INJECTION, SOLUTION INTRAVENOUS at 03:17

## 2024-03-08 RX ADMIN — ONDANSETRON 4 MG: 2 INJECTION INTRAMUSCULAR; INTRAVENOUS at 08:56

## 2024-03-08 RX ADMIN — ACETAMINOPHEN 1000 MG: 10 INJECTION INTRAVENOUS at 17:30

## 2024-03-08 RX ADMIN — PIPERACILLIN SODIUM AND TAZOBACTAM SODIUM 3.38 G: 3; .375 INJECTION, SOLUTION INTRAVENOUS at 09:05

## 2024-03-08 RX ADMIN — HEPARIN SODIUM 5000 UNITS: 5000 INJECTION INTRAVENOUS; SUBCUTANEOUS at 10:18

## 2024-03-08 RX ADMIN — HEPARIN SODIUM 5000 UNITS: 5000 INJECTION INTRAVENOUS; SUBCUTANEOUS at 02:04

## 2024-03-08 ASSESSMENT — PAIN SCALES - GENERAL
PAINLEVEL_OUTOF10: 5 - MODERATE PAIN
PAINLEVEL_OUTOF10: 6
PAINLEVEL_OUTOF10: 7
PAINLEVEL_OUTOF10: 6
PAINLEVEL_OUTOF10: 6

## 2024-03-08 ASSESSMENT — PAIN - FUNCTIONAL ASSESSMENT
PAIN_FUNCTIONAL_ASSESSMENT: 0-10

## 2024-03-08 ASSESSMENT — PAIN DESCRIPTION - ORIENTATION
ORIENTATION: LEFT
ORIENTATION: LEFT

## 2024-03-08 ASSESSMENT — COGNITIVE AND FUNCTIONAL STATUS - GENERAL
MOBILITY SCORE: 24
DAILY ACTIVITIY SCORE: 24

## 2024-03-08 ASSESSMENT — PAIN DESCRIPTION - LOCATION
LOCATION: ABDOMEN
LOCATION: ABDOMEN

## 2024-03-08 NOTE — PROGRESS NOTES
Transitional Care Coordination Progress Note:  PLAN: Rufus drain removed. Continue NGT to gravity. Possible removal today.Monitoring for return of bowel function.     PAYOR: [employer]    DISPO: Home no needs. ADOD Monday.     SUPPORT/CONTACT: Nikole Montelongo, [phone]    Carolina Pelletier RN, TCC

## 2024-03-08 NOTE — PROGRESS NOTES
"Thoracic Surgery Progress Note  3/8/2024    Jewel Rodriguez is a 34 y.o. female with history of VATs esophagectomy for achalasia who presented acutely with high grade closed loop small bowel obstruction secondary to large hiatal hernia now 4 Days Post-Op status post exploratory laparotomy, lysis of adhesions, para-conduit hiatal hernia repair without mesh, and EGD.    Overnight issues: Rufus drain removed 3/7. NGT output ~1.4L over past 24 hours.  Patient with continued c/o NGT discomfort. NGT placed gravity drainage. Endorses + flatus and denies nausea this am. Otherwise satisfactory post op course.     Physical Exam:  General: She is a pleasant female currently in no distress, resting comfortably in bed.  Flat, depressed affect.   /78 (BP Location: Left arm, Patient Position: Lying)   Pulse 69   Temp 36.5 °C (97.7 °F) (Temporal)   Resp 17   Ht 1.676 m (5' 6\")   Wt 65.1 kg (143 lb 9.6 oz)   SpO2 99%   BMI 23.18 kg/m²    Body mass index is 23.18 kg/m².   HEENT: Normocephalic and atraumatic. NGT secure in nares. Dark brown gastric output in canister. Placed to gravity drainage.   NECK: Supple. Trach midline. No JVD.   CHEST: Breathing comfortably on room air  HEART: Regular rate and rhythm.   ABDOMEN: Soft, non-distended, appropriately tender. Midline well approximated with staples, no surrounding erythema or drainage.   : voiding per bathroom   NEUROLOGIC: Alert and oriented. Grossly intact.   EXTREMITIES: Moves all extremities equally.  Pedal pulses are palpable. No lower extremity edema. No calf tenderness.     Diagnostics:   Visit Vitals  /78 (BP Location: Left arm, Patient Position: Lying)   Pulse 69   Temp 36.5 °C (97.7 °F) (Temporal)   Resp 17   Ht 1.676 m (5' 6\")   Wt 65.1 kg (143 lb 9.6 oz)   SpO2 99%   BMI 23.18 kg/m²   Smoking Status Never   BSA 1.74 m²       Intake/Output Summary (Last 24 hours) at 3/8/2024 0826  Last data filed at 3/8/2024 0400  Gross per 24 hour   Intake 3226.67 " ml   Output 1400 ml   Net 1826.67 ml       Results from last 7 days   Lab Units 03/07/24  0656 03/06/24  0954 03/05/24  0814   WBC AUTO x10*3/uL 10.1 9.5 13.0*   HEMOGLOBIN g/dL 10.4* 9.3* 10.8*   HEMATOCRIT % 31.7* 30.9* 33.0*   PLATELETS AUTO x10*3/uL 289 256 335       Results from last 7 days   Lab Units 03/07/24  0656 03/06/24  0954 03/05/24  0814   SODIUM mmol/L 136 137 137   POTASSIUM mmol/L 3.7 3.3* 3.6   CHLORIDE mmol/L 104 101 101   CO2 mmol/L 24 27 25   BUN mg/dL 6 7 8   CREATININE mg/dL 0.61 0.66 0.75   GLUCOSE mg/dL 108* 103* 124*   CALCIUM mg/dL 8.1* 7.9* 8.2*       Results from last 7 days   Lab Units 03/07/24  0656 03/06/24  0954 03/05/24  0814   SODIUM mmol/L 136 137 137   POTASSIUM mmol/L 3.7 3.3* 3.6   CHLORIDE mmol/L 104 101 101   CO2 mmol/L 24 27 25   BUN mg/dL 6 7 8   CREATININE mg/dL 0.61 0.66 0.75   GLUCOSE mg/dL 108* 103* 124*   CALCIUM mg/dL 8.1* 7.9* 8.2*       Scheduled medications  acetaminophen, 1,000 mg, intravenous, q6h [facility]  heparin, 5,000 Units, subcutaneous, q8h  ketorolac, 15 mg, intravenous, q6h [facility]  oxygen, , inhalation, Continuous - 02/gases  pantoprazole, 40 mg, intravenous, Daily  piperacillin-tazobactam, 3.375 g, intravenous, q6h  scopolamine, 1 patch, transdermal, q72h      Continuous medications  potassium sazwwpb-A9-9.45%NaCl, 100 mL/hr, Last Rate: 100 mL/hr (03/08/24 0317)  HYDROmorphone,       PRN medications  PRN medications: albuterol, methocarbamol, naloxone, naloxone, ondansetron ODT **OR** ondansetron, phenoL, promethazine    Imaging:   AM CXR reviewed. Expected post op changes and NGT placement. Improved gastric conduit dilation. No clinically significant pneumothorax. No formal report at this time.     Assessment:  Jewel Rodriguez is a 34 y.o. female status post exploratory laparotomy, lysis of adhesions, para-conduit hiatal hernia repair without mesh, and EGD on 3/4/24. Satisfactory post op course.     3/6/2024: Chest tube removed. NGT pulled back to 50 cm.    3/7/2024; some ROBF, but still with high NGT outputs. Abd corinna drain removed.   3/8/24: NGT output ~1.4L over past 24 hours.  Patient with continued c/o NGT discomfort. NGT placed gravity drainage. Endorses + flatus and denies nausea this am.     Plan:  Neurology: post operative pain  -Continue PCA pump  -Continue IV acetaminophen, IV robaxin   -Out of bed to the chair throughout the day  -Encourage ambulation as tolerated    Cardiovascular:   -Continue telemetry  -Vital signs every four hours  -Replace electrolytes as needed for K >4, Mg >2, replace potassium today    Pulmonology: chest tube removed 3/6/24  -Encourage incentive spirometer use every hour  -Continue pulmonary hygiene  -Nebs as needed   -Chest tube removed 3/6  -Obtain daily CXR    Gastrointestinal: Achalasia s/p remote h/o esophagectomy presented with large herniation of colon and bowel through hiatus s/p repair. Post op ileus, awaiting ROBF  - NPO with NGT in place. NGT to gd. Re-eval in 6 hours for possible removal  - Monitor NG output, withdrawn from 55cm to 50cm at the nare 3/6  - Zofran available for nausea, phenergan added for 2nd line. Scop patch added today.   - continue IVF D5 1/2NS, 20mg k+ at 100ml/hr   - Await return of bowel function, minimal flatus. No nausea.   - Discontinue abdominal drain 3/7    Genitourinary:   -Continue to monitor daily electrolytes with routine BMPs   -Adequate urine output    Infectious Disease:   -Continue to trend daily temperatures and WBC count to monitor for signs of post-operative infection, trending down today   -Monitor surgical incisions for signs of infection   -Disontinue IV Zosyn- completed 4d course today.     Hematology:   -Monitor for signs of acute blood loss  -Trend CBCs     Endocrine:   - Blood glucose stable, no changes at this time    DVT Prophylaxis:   -Continue subcutaneous heparin and SCDs, early ambulation    Disposition:  -Plan for discharge to home when medically ready, no MARTY at this  time.   -Likely no home-going needs    Patient seen and examined by this provider. Plan of care discussed with Dr. Rolf Tapia, APRN-CNP  Thoracic Surgery Pager [phone]

## 2024-03-08 NOTE — CARE PLAN
Problem: Pain  Goal: My pain/discomfort is manageable  Outcome: Progressing     Problem: Daily Care  Goal: Daily care needs are met  Outcome: Progressing     Problem: Psychosocial Needs  Goal: Demonstrates ability to cope with hospitalization/illness  Outcome: Progressing  Goal: Collaborate with me, my family, and caregiver to identify my specific goals  Outcome: Progressing     Problem: Pain - Adult  Goal: Verbalizes/displays adequate comfort level or baseline comfort level  Outcome: Progressing     Problem: Safety - Adult  Goal: Free from fall injury  Outcome: Progressing     Problem: Discharge Planning  Goal: Discharge to home or other facility with appropriate resources  Outcome: Progressing     Problem: Chronic Conditions and Co-morbidities  Goal: Patient's chronic conditions and co-morbidity symptoms are monitored and maintained or improved  Outcome: Progressing     Problem: Skin  Goal: Decreased wound size/increased tissue granulation at next dressing change  Outcome: Progressing  Goal: Participates in plan/prevention/treatment measures  Outcome: Progressing  Goal: Prevent/manage excess moisture  Outcome: Progressing  Goal: Prevent/minimize sheer/friction injuries  Outcome: Progressing  Goal: Promote/optimize nutrition  Outcome: Progressing  Goal: Promote skin healing  Outcome: Progressing     Problem: Pain  Goal: Takes deep breaths with improved pain control throughout the shift  Outcome: Progressing  Goal: Turns in bed with improved pain control throughout the shift  Outcome: Progressing  Goal: Walks with improved pain control throughout the shift  Outcome: Progressing  Goal: Performs ADL's with improved pain control throughout shift  Outcome: Progressing  Goal: Participates in PT with improved pain control throughout the shift  Outcome: Progressing  Goal: Free from opioid side effects throughout the shift  Outcome: Progressing  Goal: Free from acute confusion related to pain meds throughout the  shift  Outcome: Progressing     Problem: Fall/Injury  Goal: Not fall by end of shift  Outcome: Progressing  Goal: Be free from injury by end of the shift  Outcome: Progressing  Goal: Verbalize understanding of personal risk factors for fall in the hospital  Outcome: Progressing  Goal: Verbalize understanding of risk factor reduction measures to prevent injury from fall in the home  Outcome: Progressing  Goal: Use assistive devices by end of the shift  Outcome: Progressing  Goal: Pace activities to prevent fatigue by end of the shift  Outcome: Progressing

## 2024-03-09 ENCOUNTER — APPOINTMENT (OUTPATIENT)
Dept: RADIOLOGY | Facility: HOSPITAL | Age: 34
End: 2024-03-09
Payer: MEDICAID

## 2024-03-09 LAB
ANION GAP SERPL CALC-SCNC: 14 MMOL/L (ref 10–20)
BUN SERPL-MCNC: 5 MG/DL (ref 6–23)
CALCIUM SERPL-MCNC: 8.4 MG/DL (ref 8.6–10.6)
CHLORIDE SERPL-SCNC: 103 MMOL/L (ref 98–107)
CO2 SERPL-SCNC: 21 MMOL/L (ref 21–32)
CREAT SERPL-MCNC: 0.58 MG/DL (ref 0.5–1.05)
EGFRCR SERPLBLD CKD-EPI 2021: >90 ML/MIN/1.73M*2
ERYTHROCYTE [DISTWIDTH] IN BLOOD BY AUTOMATED COUNT: 14.5 % (ref 11.5–14.5)
GLUCOSE SERPL-MCNC: 81 MG/DL (ref 74–99)
HCT VFR BLD AUTO: 31.6 % (ref 36–46)
HGB BLD-MCNC: 10 G/DL (ref 12–16)
MAGNESIUM SERPL-MCNC: 1.88 MG/DL (ref 1.6–2.4)
MCH RBC QN AUTO: 28.1 PG (ref 26–34)
MCHC RBC AUTO-ENTMCNC: 31.6 G/DL (ref 32–36)
MCV RBC AUTO: 89 FL (ref 80–100)
NRBC BLD-RTO: 0 /100 WBCS (ref 0–0)
PLATELET # BLD AUTO: 323 X10*3/UL (ref 150–450)
POTASSIUM SERPL-SCNC: 3.9 MMOL/L (ref 3.5–5.3)
RBC # BLD AUTO: 3.56 X10*6/UL (ref 4–5.2)
SODIUM SERPL-SCNC: 134 MMOL/L (ref 136–145)
WBC # BLD AUTO: 9.9 X10*3/UL (ref 4.4–11.3)

## 2024-03-09 PROCEDURE — 71045 X-RAY EXAM CHEST 1 VIEW: CPT | Performed by: RADIOLOGY

## 2024-03-09 PROCEDURE — 2500000004 HC RX 250 GENERAL PHARMACY W/ HCPCS (ALT 636 FOR OP/ED): Performed by: PHYSICIAN ASSISTANT

## 2024-03-09 PROCEDURE — 80048 BASIC METABOLIC PNL TOTAL CA: CPT | Performed by: NURSE PRACTITIONER

## 2024-03-09 PROCEDURE — C9113 INJ PANTOPRAZOLE SODIUM, VIA: HCPCS | Performed by: NURSE PRACTITIONER

## 2024-03-09 PROCEDURE — 1200000002 HC GENERAL ROOM WITH TELEMETRY DAILY

## 2024-03-09 PROCEDURE — 2500000004 HC RX 250 GENERAL PHARMACY W/ HCPCS (ALT 636 FOR OP/ED): Performed by: NURSE PRACTITIONER

## 2024-03-09 PROCEDURE — 36415 COLL VENOUS BLD VENIPUNCTURE: CPT | Performed by: NURSE PRACTITIONER

## 2024-03-09 PROCEDURE — 2500000004 HC RX 250 GENERAL PHARMACY W/ HCPCS (ALT 636 FOR OP/ED): Performed by: STUDENT IN AN ORGANIZED HEALTH CARE EDUCATION/TRAINING PROGRAM

## 2024-03-09 PROCEDURE — 83735 ASSAY OF MAGNESIUM: CPT | Performed by: NURSE PRACTITIONER

## 2024-03-09 PROCEDURE — 2500000001 HC RX 250 WO HCPCS SELF ADMINISTERED DRUGS (ALT 637 FOR MEDICARE OP): Performed by: STUDENT IN AN ORGANIZED HEALTH CARE EDUCATION/TRAINING PROGRAM

## 2024-03-09 PROCEDURE — 85027 COMPLETE CBC AUTOMATED: CPT | Performed by: NURSE PRACTITIONER

## 2024-03-09 PROCEDURE — 71045 X-RAY EXAM CHEST 1 VIEW: CPT

## 2024-03-09 RX ORDER — SODIUM CHLORIDE 0.9 % (FLUSH) 0.9 %
10 SYRINGE (ML) INJECTION EVERY 12 HOURS
Status: DISCONTINUED | OUTPATIENT
Start: 2024-03-09 | End: 2024-03-11 | Stop reason: HOSPADM

## 2024-03-09 RX ORDER — SODIUM CHLORIDE 0.9 % (FLUSH) 0.9 %
10 SYRINGE (ML) INJECTION AS NEEDED
Status: DISCONTINUED | OUTPATIENT
Start: 2024-03-09 | End: 2024-03-11 | Stop reason: HOSPADM

## 2024-03-09 RX ORDER — ACETAMINOPHEN 10 MG/ML
1000 INJECTION, SOLUTION INTRAVENOUS EVERY 6 HOURS PRN
Status: DISCONTINUED | OUTPATIENT
Start: 2024-03-09 | End: 2024-03-10

## 2024-03-09 RX ORDER — KETOROLAC TROMETHAMINE 15 MG/ML
15 INJECTION, SOLUTION INTRAMUSCULAR; INTRAVENOUS EVERY 6 HOURS PRN
Status: DISPENSED | OUTPATIENT
Start: 2024-03-09 | End: 2024-03-11

## 2024-03-09 RX ORDER — BISACODYL 10 MG/1
10 SUPPOSITORY RECTAL ONCE
Status: COMPLETED | OUTPATIENT
Start: 2024-03-09 | End: 2024-03-09

## 2024-03-09 RX ADMIN — BISACODYL 10 MG: 10 SUPPOSITORY RECTAL at 10:34

## 2024-03-09 RX ADMIN — KETOROLAC TROMETHAMINE 15 MG: 15 INJECTION, SOLUTION INTRAMUSCULAR; INTRAVENOUS at 23:43

## 2024-03-09 RX ADMIN — PANTOPRAZOLE SODIUM 40 MG: 40 INJECTION, POWDER, FOR SOLUTION INTRAVENOUS at 08:34

## 2024-03-09 RX ADMIN — HEPARIN SODIUM 5000 UNITS: 5000 INJECTION INTRAVENOUS; SUBCUTANEOUS at 18:30

## 2024-03-09 RX ADMIN — METHOCARBAMOL 1000 MG: 100 INJECTION, SOLUTION INTRAMUSCULAR; INTRAVENOUS at 17:25

## 2024-03-09 RX ADMIN — Medication: at 09:03

## 2024-03-09 RX ADMIN — HEPARIN SODIUM 5000 UNITS: 5000 INJECTION INTRAVENOUS; SUBCUTANEOUS at 02:57

## 2024-03-09 RX ADMIN — ACETAMINOPHEN 1000 MG: 10 INJECTION INTRAVENOUS at 05:55

## 2024-03-09 RX ADMIN — HEPARIN SODIUM 5000 UNITS: 5000 INJECTION INTRAVENOUS; SUBCUTANEOUS at 10:34

## 2024-03-09 RX ADMIN — ACETAMINOPHEN 1000 MG: 10 INJECTION INTRAVENOUS at 00:14

## 2024-03-09 ASSESSMENT — COGNITIVE AND FUNCTIONAL STATUS - GENERAL
DAILY ACTIVITIY SCORE: 24
MOBILITY SCORE: 24

## 2024-03-09 ASSESSMENT — PAIN SCALES - GENERAL
PAINLEVEL_OUTOF10: 3
PAINLEVEL_OUTOF10: 4

## 2024-03-09 ASSESSMENT — PAIN - FUNCTIONAL ASSESSMENT: PAIN_FUNCTIONAL_ASSESSMENT: 0-10

## 2024-03-09 NOTE — CONSULTS
"Nutrition Initial Assessment:   Nutrition Assessment    Reason for Assessment: TPN recommendations    Patient is a 34 y.o. female presenting with N/V, abdominal pain, & constipation. CT revealed high-grade SBO 2/2 para-conduit hernia.    Pt taken to OR (3/4) now s/p exploratory laparotomy, lysis of adhesions, para-conduit hiatal hernia repair without mesh, and EGD.     NGT removed this AM & pt subsequently advanced to clear liquid diet. Awaiting ROBF.    Nutrition History:  Energy Intake: Poor < 50 % (x7 days)  Food and Nutrient History: Pt resting in bed at time of visit. Reports that prior to Saturday night (3/2), that she was in her usual state of health. Notes she typically consumes 3 meals/day with good appetite & was not having any trouble finishing her meals. Denies use of any oral nutritional supplements at home. RDN briefly discussed parenteral nutrition. Pt reports team told her this AM that plan is to hold off on TPN for now.  Vitamin/Herbal Supplement Use: Denies use  Food Allergies/Intolerances:  None  GI Symptoms: Constipation, Nausea, Vomiting, and Abdominal pain  Oral Problems: None     Anthropometrics:  Height: 167.6 cm (5' 6\")   Weight: 64.2 kg (141 lb 9.6 oz)   BMI (Calculated): 22.87  IBW/kg (Dietitian Calculated): 59.1 kg  Percent of IBW: 109 %     Weight History:   Wt Readings from Last 5 Encounters:   03/09/24 64.2 kg (141 lb 9.6 oz) --> Admit wt   09/23/22 61.7 kg (136 lb)   02/21/22 72 kg (158 lb 11.7 oz)     Weight Change %:  Weight History / % Weight Change: Pt reports UBW of ~65.9 kg indicating a 2.5% weight loss.  Significant Weight Loss: Yes  Interpretation of Weight Loss: >2% in 1 week    Nutrition Focused Physical Exam Findings:  Subcutaneous Fat Loss:   Orbital Fat Pads: Well nourshed (slightly bulging fat pads)  Buccal Fat Pads: Well nourished (full, rounded cheeks)  Triceps: Well nourished (ample fat tissue)  Muscle Wasting:  Temporalis: Well nourished (well-defined " muscle)  Pectoralis (Clavicular Region): Mild-Moderate (some protrusion of clavicle)  Deltoid/Trapezius: Well nourished (rounded appearance at arm, shoulder, neck)  Interosseous: Well nourished (muscle bulges)  Edema:  Edema: none  Physical Findings:  Hair: Negative  Eyes: Negative  Mouth: Negative  Nails: Negative  Skin: Negative (Medial upper abdominal incision)    Nutrition Significant Labs:  BMP Trend:   Results from last 7 days   Lab Units 03/09/24  0623 03/08/24  1015 03/07/24  0656 03/06/24  0954   GLUCOSE mg/dL 81 115* 108* 103*   CALCIUM mg/dL 8.4* 8.2* 8.1* 7.9*   SODIUM mmol/L 134* 135* 136 137   POTASSIUM mmol/L 3.9 4.0 3.7 3.3*   CO2 mmol/L 21 19* 24 27   CHLORIDE mmol/L 103 106 104 101   BUN mg/dL 5* 5* 6 7   CREATININE mg/dL 0.58 0.67 0.61 0.66      Nutrition Specific Medications:  Scheduled medications  bisacodyl, 10 mg, rectal, Once  pantoprazole, 40 mg, intravenous, Daily  scopolamine, 1 patch, transdermal, q72h    Continuous medications  potassium mihkuno-R9-7.45%NaCl, 50 mL/hr, Last Rate: 50 mL/hr (03/08/24 1202)    I/O:   Awaiting post-op ROBF.    Dietary Orders (From admission, onward)       Start     Ordered    03/09/24 0946  Adult diet Clear Liquid  Diet effective now        Question:  Diet type  Answer:  Clear Liquid    03/09/24 0945              Estimated Needs:   Total Energy Estimated Needs (kCal):  (8765-8774)  Method for Estimating Needs: MSJ (REE: 1365) x 1.2-1.3  Total Protein Estimated Needs (g):  (75-85)  Method for Estimating Needs: 1.2-1.3 g/kg x ABW  Total Fluid Estimated Needs (mL):  (0783-5333)  Method for Estimating Needs: 1mL/kcal or per team        Nutrition Diagnosis   Malnutrition Diagnosis  Patient has Malnutrition Diagnosis: Yes  Diagnosis Status: New  Malnutrition Diagnosis: Severe malnutrition related to acute disease or injury  As Evidenced by: <50% EENs for >5 days prompting a significant 2.5% weight loss x 1 week      Nutrition Interventions/Recommendations          Nutrition Prescription:  Continue clear liquid diet & advance, as tolerated  Will provide trial of Ensure Clear daily    Consider IV thiamine x7 days given refeeding risk      If pt unable to tolerate PO intake, recommend proceeding w/ obtaining central access & starting TPN  Prior to starting TPN, check RFP + Mg daily - replete electrolytes as needed.   Recommend gradual 3 day advancement given NPO >5 days    Day 1: Start standard TPN 5% Amino Acids, 15% Dextrose @ 20mL/hr   Day 2: Increase to rate of 45mL/hr   Day 3: Increase to goal rate @ 70mL/hr  Do not advance if major shift in electrolytes or blood sugars occur  Include MVI + trace elements    Provide SMOF lipids @ 20.8mL/hr x 12 hours overnight (250mL total)     TPN monitoring:   Daily weights  RFP + Mg daily; replete lytes PRN  LFTs + TGs weekly  Accuchecks q6h    Provides: 1693 kcals, 84g protein, 252g dextrose, 30% kcal from fat (meeting 100% kcal & protein needs)            Nutrition Interventions:   Interventions: Meals and snacks  Goal: Tolerate clear liquids    Nutrition Monitoring and Evaluation   Food/Nutrient Related History Monitoring  Monitoring and Evaluation Plan: Energy intake  Energy Intake: Estimated energy intake  Criteria: Meet >50% EENs    Time Spent/Follow-up Reminder:   Time Spent (min): 60 minutes  Last Date of Nutrition Visit: 03/09/24  Nutrition Follow-Up Needed?: Dietitian to reassess per policy

## 2024-03-09 NOTE — CARE PLAN
The patient's goals for the shift include      The clinical goals for the shift include pt will ambulate during shift    Over the shift, the patient did not make progress toward the following goals. Barriers to progression include . Recommendations to address these barriers include .

## 2024-03-09 NOTE — PROGRESS NOTES
"Thoracic Surgery Progress Note  3/9/2024    Jewel Rodriguez is a 34 y.o. female with history of VATs esophagectomy for achalasia who presented acutely with high grade closed loop small bowel obstruction secondary to large hiatal hernia now 5 Days Post-Op status post exploratory laparotomy, lysis of adhesions, para-conduit hiatal hernia repair without mesh, and EGD.    Overnight issues: Rufus drain removed 3/7. NGT removed yesterday. Patient reports feeling improved without the NG in place. Denies nausea or vomiting. Is passing flatus. Otherwise satisfactory post op course.     Physical Exam:  General: She is a pleasant female currently in no distress, resting comfortably in bed.  Flat, depressed affect.   /68 (BP Location: Right arm, Patient Position: Lying)   Pulse 72   Temp 37.4 °C (99.3 °F) (Temporal)   Resp 18   Ht 1.676 m (5' 6\")   Wt 64.2 kg (141 lb 9.6 oz)   SpO2 98%   BMI 22.85 kg/m²    Body mass index is 22.85 kg/m².   HEENT: Normocephalic and atraumatic.   NECK: Supple. Trach midline. No JVD.   CHEST: Breathing comfortably on room air  HEART: Regular rate and rhythm.   ABDOMEN: Soft, non-distended, appropriately tender. Midline well approximated with staples, no surrounding erythema or drainage.   : voiding per bathroom   NEUROLOGIC: Alert and oriented. Grossly intact.   EXTREMITIES: Moves all extremities equally.  Pedal pulses are palpable. No lower extremity edema. No calf tenderness.     Diagnostics:   Visit Vitals  /68 (BP Location: Right arm, Patient Position: Lying)   Pulse 72   Temp 37.4 °C (99.3 °F) (Temporal)   Resp 18   Ht 1.676 m (5' 6\")   Wt 64.2 kg (141 lb 9.6 oz)   SpO2 98%   BMI 22.85 kg/m²   Smoking Status Never   BSA 1.73 m²       Intake/Output Summary (Last 24 hours) at 3/9/2024 1013  Last data filed at 3/9/2024 0700  Gross per 24 hour   Intake 2 ml   Output --   Net 2 ml     Results from last 7 days   Lab Units 03/09/24  0623 03/08/24  1015 03/07/24  0656   WBC " AUTO x10*3/uL 9.9 10.4 10.1   HEMOGLOBIN g/dL 10.0* 9.9* 10.4*   HEMATOCRIT % 31.6* 28.9* 31.7*   PLATELETS AUTO x10*3/uL 323 287 289     Results from last 7 days   Lab Units 03/09/24  0623 03/08/24  1015 03/07/24  0656   SODIUM mmol/L 134* 135* 136   POTASSIUM mmol/L 3.9 4.0 3.7   CHLORIDE mmol/L 103 106 104   CO2 mmol/L 21 19* 24   BUN mg/dL 5* 5* 6   CREATININE mg/dL 0.58 0.67 0.61   GLUCOSE mg/dL 81 115* 108*   CALCIUM mg/dL 8.4* 8.2* 8.1*     Results from last 7 days   Lab Units 03/09/24  0623 03/08/24  1015 03/07/24  0656   SODIUM mmol/L 134* 135* 136   POTASSIUM mmol/L 3.9 4.0 3.7   CHLORIDE mmol/L 103 106 104   CO2 mmol/L 21 19* 24   BUN mg/dL 5* 5* 6   CREATININE mg/dL 0.58 0.67 0.61   GLUCOSE mg/dL 81 115* 108*   CALCIUM mg/dL 8.4* 8.2* 8.1*     Scheduled medications  bisacodyl, 10 mg, rectal, Once  heparin, 5,000 Units, subcutaneous, q8h  oxygen, , inhalation, Continuous - 02/gases  pantoprazole, 40 mg, intravenous, Daily  scopolamine, 1 patch, transdermal, q72h  sodium chloride 0.9%, 10 mL, intra-catheter, q12h      Continuous medications  potassium enapucs-F1-3.45%NaCl, 50 mL/hr, Last Rate: 50 mL/hr (03/08/24 1202)  HYDROmorphone,       PRN medications  PRN medications: acetaminophen, albuterol, alteplase, ketorolac, methocarbamol, naloxone, naloxone, ondansetron ODT **OR** ondansetron, phenoL, promethazine, sodium chloride 0.9%    Imaging:   AM CXR reviewed. Expected post op changes. Gastric conduit dilation. No clinically significant pneumothorax. No formal report at this time.     Assessment:  Jewel Rodriguez is a 34 y.o. female status post exploratory laparotomy, lysis of adhesions, para-conduit hiatal hernia repair without mesh, and EGD on 3/4/24. Satisfactory post op course.     3/6/2024: Chest tube removed. NGT pulled back to 50 cm.   3/7/2024; some ROBF, but still with high NGT outputs. Abd corinna drain removed.   3/8/24: NGT output ~1.4L over past 24 hours.  Patient with continued c/o NGT  discomfort. NGT placed gravity drainage. Endorses + flatus and denies nausea this am.   3/9: Denies nausea/vomiting, +flatus    Plan:  Neurology: post operative pain  -Continue PCA pump, will transition to oral meds when tolerating intake  -Continue IV acetaminophen, IV robaxin   -Out of bed to the chair throughout the day  -Encourage ambulation as tolerated    Cardiovascular:   -Continue telemetry  -Vital signs every four hours  -Replace electrolytes as needed for K >4, Mg >2, replace potassium today    Pulmonology: chest tube removed 3/6/24  -Encourage incentive spirometer use every hour  -Continue pulmonary hygiene  -Nebs as needed   -Chest tube removed 3/6  -Obtain daily CXR    Gastrointestinal: Achalasia s/p remote h/o esophagectomy presented with large herniation of colon and bowel through hiatus s/p repair. Post op ileus, having return of bowel function  - Advance to CLD  - Zofran available for nausea, phenergan added for 2nd line. Scop patch added today.   - continue IVF D5 1/2NS, 20mg k+ at 50 ml/hr   - Monitor bowel function and for nausea  - Discontinue abdominal drain 3/7    Genitourinary:   -Continue to monitor daily electrolytes with routine BMPs   -Adequate urine output    Infectious Disease:   -Continue to trend daily temperatures and WBC count to monitor for signs of post-operative infection, trending down today   -Monitor surgical incisions for signs of infection   -Disontinue IV Zosyn- completed 4d course today.     Hematology:   -Monitor for signs of acute blood loss  -Trend CBCs     Endocrine:   - Blood glucose stable, no changes at this time    DVT Prophylaxis:   -Continue subcutaneous heparin and SCDs, early ambulation    Disposition:  -Plan for discharge to home when medically ready, no MARTY at this time.   -Likely no home-going needs    Patient seen and examined by this provider. Plan of care discussed with attending.    Migdalia Horn DO - PGY3  Thoracic Surgery

## 2024-03-10 LAB
ANION GAP SERPL CALC-SCNC: 15 MMOL/L (ref 10–20)
BUN SERPL-MCNC: 3 MG/DL (ref 6–23)
CALCIUM SERPL-MCNC: 8.8 MG/DL (ref 8.6–10.6)
CHLORIDE SERPL-SCNC: 102 MMOL/L (ref 98–107)
CO2 SERPL-SCNC: 21 MMOL/L (ref 21–32)
CREAT SERPL-MCNC: 0.61 MG/DL (ref 0.5–1.05)
EGFRCR SERPLBLD CKD-EPI 2021: >90 ML/MIN/1.73M*2
ERYTHROCYTE [DISTWIDTH] IN BLOOD BY AUTOMATED COUNT: 14.3 % (ref 11.5–14.5)
GLUCOSE SERPL-MCNC: 160 MG/DL (ref 74–99)
HCT VFR BLD AUTO: 31.5 % (ref 36–46)
HGB BLD-MCNC: 10.5 G/DL (ref 12–16)
MAGNESIUM SERPL-MCNC: 1.81 MG/DL (ref 1.6–2.4)
MCH RBC QN AUTO: 28.1 PG (ref 26–34)
MCHC RBC AUTO-ENTMCNC: 33.3 G/DL (ref 32–36)
MCV RBC AUTO: 84 FL (ref 80–100)
NRBC BLD-RTO: 0 /100 WBCS (ref 0–0)
PLATELET # BLD AUTO: 344 X10*3/UL (ref 150–450)
POTASSIUM SERPL-SCNC: 4.4 MMOL/L (ref 3.5–5.3)
RBC # BLD AUTO: 3.74 X10*6/UL (ref 4–5.2)
SODIUM SERPL-SCNC: 134 MMOL/L (ref 136–145)
WBC # BLD AUTO: 9.5 X10*3/UL (ref 4.4–11.3)

## 2024-03-10 PROCEDURE — 2500000004 HC RX 250 GENERAL PHARMACY W/ HCPCS (ALT 636 FOR OP/ED): Performed by: PHYSICIAN ASSISTANT

## 2024-03-10 PROCEDURE — 2500000004 HC RX 250 GENERAL PHARMACY W/ HCPCS (ALT 636 FOR OP/ED): Performed by: STUDENT IN AN ORGANIZED HEALTH CARE EDUCATION/TRAINING PROGRAM

## 2024-03-10 PROCEDURE — 80048 BASIC METABOLIC PNL TOTAL CA: CPT | Performed by: NURSE PRACTITIONER

## 2024-03-10 PROCEDURE — 2500000004 HC RX 250 GENERAL PHARMACY W/ HCPCS (ALT 636 FOR OP/ED): Performed by: NURSE PRACTITIONER

## 2024-03-10 PROCEDURE — 36415 COLL VENOUS BLD VENIPUNCTURE: CPT | Performed by: NURSE PRACTITIONER

## 2024-03-10 PROCEDURE — 2500000001 HC RX 250 WO HCPCS SELF ADMINISTERED DRUGS (ALT 637 FOR MEDICARE OP): Performed by: STUDENT IN AN ORGANIZED HEALTH CARE EDUCATION/TRAINING PROGRAM

## 2024-03-10 PROCEDURE — C9113 INJ PANTOPRAZOLE SODIUM, VIA: HCPCS | Performed by: NURSE PRACTITIONER

## 2024-03-10 PROCEDURE — 83735 ASSAY OF MAGNESIUM: CPT | Performed by: NURSE PRACTITIONER

## 2024-03-10 PROCEDURE — 1200000002 HC GENERAL ROOM WITH TELEMETRY DAILY

## 2024-03-10 PROCEDURE — 85027 COMPLETE CBC AUTOMATED: CPT | Performed by: NURSE PRACTITIONER

## 2024-03-10 RX ORDER — OXYCODONE HYDROCHLORIDE 5 MG/1
10 TABLET ORAL EVERY 4 HOURS PRN
Status: DISCONTINUED | OUTPATIENT
Start: 2024-03-10 | End: 2024-03-11

## 2024-03-10 RX ORDER — OXYCODONE HYDROCHLORIDE 5 MG/1
5 TABLET ORAL EVERY 4 HOURS PRN
Status: DISCONTINUED | OUTPATIENT
Start: 2024-03-10 | End: 2024-03-11

## 2024-03-10 RX ORDER — ACETAMINOPHEN 325 MG/1
650 TABLET ORAL EVERY 6 HOURS
Status: DISCONTINUED | OUTPATIENT
Start: 2024-03-10 | End: 2024-03-11 | Stop reason: HOSPADM

## 2024-03-10 RX ORDER — AMOXICILLIN 250 MG
1 CAPSULE ORAL 2 TIMES DAILY
Status: DISCONTINUED | OUTPATIENT
Start: 2024-03-10 | End: 2024-03-11 | Stop reason: HOSPADM

## 2024-03-10 RX ADMIN — ACETAMINOPHEN 650 MG: 325 TABLET ORAL at 19:42

## 2024-03-10 RX ADMIN — OXYCODONE HYDROCHLORIDE 10 MG: 5 TABLET ORAL at 23:43

## 2024-03-10 RX ADMIN — HEPARIN SODIUM 5000 UNITS: 5000 INJECTION INTRAVENOUS; SUBCUTANEOUS at 09:39

## 2024-03-10 RX ADMIN — OXYCODONE HYDROCHLORIDE 10 MG: 5 TABLET ORAL at 11:20

## 2024-03-10 RX ADMIN — OXYCODONE HYDROCHLORIDE 10 MG: 5 TABLET ORAL at 15:24

## 2024-03-10 RX ADMIN — OXYCODONE HYDROCHLORIDE 10 MG: 5 TABLET ORAL at 19:43

## 2024-03-10 RX ADMIN — SENNOSIDES AND DOCUSATE SODIUM 1 TABLET: 8.6; 5 TABLET ORAL at 21:19

## 2024-03-10 RX ADMIN — SCOPOLAMINE 1 PATCH: 1.5 PATCH, EXTENDED RELEASE TRANSDERMAL at 09:40

## 2024-03-10 RX ADMIN — METHOCARBAMOL 1000 MG: 100 INJECTION, SOLUTION INTRAMUSCULAR; INTRAVENOUS at 03:09

## 2024-03-10 RX ADMIN — KETOROLAC TROMETHAMINE 15 MG: 15 INJECTION, SOLUTION INTRAMUSCULAR; INTRAVENOUS at 23:41

## 2024-03-10 RX ADMIN — POTASSIUM CHLORIDE, DEXTROSE MONOHYDRATE AND SODIUM CHLORIDE 50 ML/HR: 150; 5; 450 INJECTION, SOLUTION INTRAVENOUS at 09:40

## 2024-03-10 RX ADMIN — ACETAMINOPHEN 650 MG: 325 TABLET ORAL at 13:18

## 2024-03-10 RX ADMIN — PANTOPRAZOLE SODIUM 40 MG: 40 INJECTION, POWDER, FOR SOLUTION INTRAVENOUS at 09:39

## 2024-03-10 RX ADMIN — HEPARIN SODIUM 5000 UNITS: 5000 INJECTION INTRAVENOUS; SUBCUTANEOUS at 03:09

## 2024-03-10 RX ADMIN — HEPARIN SODIUM 5000 UNITS: 5000 INJECTION INTRAVENOUS; SUBCUTANEOUS at 17:41

## 2024-03-10 RX ADMIN — METHOCARBAMOL 1000 MG: 100 INJECTION, SOLUTION INTRAMUSCULAR; INTRAVENOUS at 21:19

## 2024-03-10 ASSESSMENT — PAIN DESCRIPTION - DESCRIPTORS
DESCRIPTORS: ACHING;SORE
DESCRIPTORS: ACHING;SORE
DESCRIPTORS: ACHING;PRESSURE
DESCRIPTORS: ACHING;SORE

## 2024-03-10 ASSESSMENT — COGNITIVE AND FUNCTIONAL STATUS - GENERAL
MOBILITY SCORE: 24
DAILY ACTIVITIY SCORE: 24
MOBILITY SCORE: 24
DAILY ACTIVITIY SCORE: 24

## 2024-03-10 ASSESSMENT — PAIN - FUNCTIONAL ASSESSMENT
PAIN_FUNCTIONAL_ASSESSMENT: 0-10

## 2024-03-10 ASSESSMENT — PAIN SCALES - GENERAL
PAINLEVEL_OUTOF10: 0 - NO PAIN
PAINLEVEL_OUTOF10: 4
PAINLEVEL_OUTOF10: 7
PAINLEVEL_OUTOF10: 8
PAINLEVEL_OUTOF10: 9
PAINLEVEL_OUTOF10: 7
PAINLEVEL_OUTOF10: 7
PAINLEVEL_OUTOF10: 5 - MODERATE PAIN
PAINLEVEL_OUTOF10: 3
PAINLEVEL_OUTOF10: 0 - NO PAIN

## 2024-03-10 ASSESSMENT — PAIN DESCRIPTION - ORIENTATION
ORIENTATION: OTHER (COMMENT)
ORIENTATION: LEFT

## 2024-03-10 ASSESSMENT — PAIN DESCRIPTION - LOCATION
LOCATION: ABDOMEN
LOCATION: HEAD

## 2024-03-10 NOTE — PROGRESS NOTES
"Thoracic Surgery Progress Note  3/10/2024    Jewel Rodriguez is a 34 y.o. female with history of VATs esophagectomy for achalasia who presented acutely with high grade closed loop small bowel obstruction secondary to large hiatal hernia now 6 Days Post-Op status post exploratory laparotomy, lysis of adhesions, para-conduit hiatal hernia repair without mesh, and EGD.    Overnight issues: Rufus drain removed 3/7. NGT removed 3/8. Patient has been tolerating CLD well. Passing flatus, had multiple Bms. Does not feel bloated. Pain well-controlled.     Physical Exam:  General: She is a pleasant female currently in no distress, resting comfortably in bed.  Flat, depressed affect.   /77 (BP Location: Right arm, Patient Position: Lying)   Pulse 80   Temp 37.1 °C (98.8 °F) (Temporal)   Resp 18   Ht 1.676 m (5' 6\")   Wt 63 kg (138 lb 12.8 oz)   SpO2 95%   BMI 22.40 kg/m²    Body mass index is 22.4 kg/m².   HEENT: Normocephalic and atraumatic.   NECK: Supple. Trach midline. No JVD.   CHEST: Breathing comfortably on room air  HEART: Regular rate and rhythm.   ABDOMEN: Soft, non-distended, appropriately tender. Midline well approximated with staples, no surrounding erythema or drainage.   : voiding per bathroom   NEUROLOGIC: Alert and oriented. Grossly intact.   EXTREMITIES: Moves all extremities equally.  Pedal pulses are palpable. No lower extremity edema. No calf tenderness.     Diagnostics:   Visit Vitals  /77 (BP Location: Right arm, Patient Position: Lying)   Pulse 80   Temp 37.1 °C (98.8 °F) (Temporal)   Resp 18   Ht 1.676 m (5' 6\")   Wt 63 kg (138 lb 12.8 oz)   SpO2 95%   BMI 22.40 kg/m²   Smoking Status Never   BSA 1.71 m²       Intake/Output Summary (Last 24 hours) at 3/10/2024 1202  Last data filed at 3/10/2024 1000  Gross per 24 hour   Intake 600 ml   Output 0 ml   Net 600 ml       Results from last 7 days   Lab Units 03/10/24  1130 03/09/24  0623 03/08/24  1015   WBC AUTO x10*3/uL 9.5 9.9 10.4 "   HEMOGLOBIN g/dL 10.5* 10.0* 9.9*   HEMATOCRIT % 31.5* 31.6* 28.9*   PLATELETS AUTO x10*3/uL 344 323 287       Results from last 7 days   Lab Units 03/09/24  0623 03/08/24  1015 03/07/24  0656   SODIUM mmol/L 134* 135* 136   POTASSIUM mmol/L 3.9 4.0 3.7   CHLORIDE mmol/L 103 106 104   CO2 mmol/L 21 19* 24   BUN mg/dL 5* 5* 6   CREATININE mg/dL 0.58 0.67 0.61   GLUCOSE mg/dL 81 115* 108*   CALCIUM mg/dL 8.4* 8.2* 8.1*       Results from last 7 days   Lab Units 03/09/24  0623 03/08/24  1015 03/07/24  0656   SODIUM mmol/L 134* 135* 136   POTASSIUM mmol/L 3.9 4.0 3.7   CHLORIDE mmol/L 103 106 104   CO2 mmol/L 21 19* 24   BUN mg/dL 5* 5* 6   CREATININE mg/dL 0.58 0.67 0.61   GLUCOSE mg/dL 81 115* 108*   CALCIUM mg/dL 8.4* 8.2* 8.1*       Scheduled medications  heparin, 5,000 Units, subcutaneous, q8h  oxygen, , inhalation, Continuous - 02/gases  pantoprazole, 40 mg, intravenous, Daily  scopolamine, 1 patch, transdermal, q72h  sodium chloride 0.9%, 10 mL, intra-catheter, q12h      Continuous medications       PRN medications  PRN medications: acetaminophen, albuterol, alteplase, ketorolac, methocarbamol, naloxone, ondansetron ODT **OR** ondansetron, oxyCODONE, oxyCODONE, phenoL, promethazine, sodium chloride 0.9%    Imaging:   No CXR this AM    Assessment:  Jewel Rodriguez is a 34 y.o. female status post exploratory laparotomy, lysis of adhesions, para-conduit hiatal hernia repair without mesh, and EGD on 3/4/24. Satisfactory post op course.     3/6/2024: Chest tube removed. NGT pulled back to 50 cm.   3/7/2024; some ROBF, but still with high NGT outputs. Abd corinna drain removed.   3/8/24: NGT output ~1.4L over past 24 hours.  Patient with continued c/o NGT discomfort. NGT placed gravity drainage. Endorses + flatus and denies nausea this am.   3/9: Denies nausea/vomiting, +flatus, started CLD  3/10: +flatus/BM, advance diet    Plan:  Neurology: post operative pain  -Discontinue PCA pump, transition to oral tylenol and  oxycodone  -Continue IV robaxin   -Out of bed to the chair throughout the day  -Encourage ambulation as tolerated    Cardiovascular:   -Continue telemetry  -Vital signs every four hours  -Replace electrolytes as needed for K >4, Mg >2, replace potassium today    Pulmonology: chest tube removed 3/6/24  -Encourage incentive spirometer use every hour  -Continue pulmonary hygiene  -Nebs as needed   -Chest tube removed 3/6  -Obtain daily CXR    Gastrointestinal: Achalasia s/p remote h/o esophagectomy presented with large herniation of colon and bowel through hiatus s/p repair. Post op ileus, having return of bowel function  - Advance to regular diet  - Zofran available for nausea, phenergan added for 2nd line. Scop patch added today.   - HLIV  - Monitor bowel function and for nausea  - Discontinue abdominal drain 3/7    Genitourinary:   -Continue to monitor daily electrolytes with routine BMPs   -Adequate urine output    Infectious Disease:   -Continue to trend daily temperatures and WBC count to monitor for signs of post-operative infection, trending down today   -Monitor surgical incisions for signs of infection   -Disontinue IV Zosyn- completed 4d course today.     Hematology:   -Monitor for signs of acute blood loss  -Trend CBCs     Endocrine:   - Blood glucose stable, no changes at this time    DVT Prophylaxis:   -Continue subcutaneous heparin and SCDs, early ambulation    Disposition:  -Plan for discharge to home when medically ready, no MARTY at this time.   -Likely no home-going needs    Patient seen and discussed with attending Dr. Sexton, discussed with Dr. Rolf Lerma-Roberta Malloy MD  Thoracic Surgery

## 2024-03-10 NOTE — CARE PLAN
The patient's goals for the shift include      The clinical goals for the shift include pt will be hds    Over the shift, the patient did not make progress toward the following goals. Barriers to progression include . Recommendations to address these barriers include .

## 2024-03-11 ENCOUNTER — PHARMACY VISIT (OUTPATIENT)
Dept: PHARMACY | Facility: CLINIC | Age: 34
End: 2024-03-11
Payer: MEDICAID

## 2024-03-11 ENCOUNTER — APPOINTMENT (OUTPATIENT)
Dept: RADIOLOGY | Facility: HOSPITAL | Age: 34
End: 2024-03-11
Payer: MEDICAID

## 2024-03-11 VITALS
TEMPERATURE: 99 F | OXYGEN SATURATION: 97 % | SYSTOLIC BLOOD PRESSURE: 107 MMHG | HEART RATE: 105 BPM | BODY MASS INDEX: 22.24 KG/M2 | WEIGHT: 138.4 LBS | HEIGHT: 66 IN | RESPIRATION RATE: 18 BRPM | DIASTOLIC BLOOD PRESSURE: 69 MMHG

## 2024-03-11 PROCEDURE — 2500000005 HC RX 250 GENERAL PHARMACY W/O HCPCS: Performed by: STUDENT IN AN ORGANIZED HEALTH CARE EDUCATION/TRAINING PROGRAM

## 2024-03-11 PROCEDURE — 2500000001 HC RX 250 WO HCPCS SELF ADMINISTERED DRUGS (ALT 637 FOR MEDICARE OP): Performed by: NURSE PRACTITIONER

## 2024-03-11 PROCEDURE — 2500000004 HC RX 250 GENERAL PHARMACY W/ HCPCS (ALT 636 FOR OP/ED): Performed by: PHYSICIAN ASSISTANT

## 2024-03-11 PROCEDURE — 2500000005 HC RX 250 GENERAL PHARMACY W/O HCPCS: Performed by: NURSE PRACTITIONER

## 2024-03-11 PROCEDURE — 2500000005 HC RX 250 GENERAL PHARMACY W/O HCPCS: Performed by: PHYSICIAN ASSISTANT

## 2024-03-11 PROCEDURE — C9113 INJ PANTOPRAZOLE SODIUM, VIA: HCPCS | Performed by: NURSE PRACTITIONER

## 2024-03-11 PROCEDURE — 2500000001 HC RX 250 WO HCPCS SELF ADMINISTERED DRUGS (ALT 637 FOR MEDICARE OP): Performed by: STUDENT IN AN ORGANIZED HEALTH CARE EDUCATION/TRAINING PROGRAM

## 2024-03-11 PROCEDURE — 71045 X-RAY EXAM CHEST 1 VIEW: CPT | Performed by: RADIOLOGY

## 2024-03-11 PROCEDURE — RXMED WILLOW AMBULATORY MEDICATION CHARGE

## 2024-03-11 PROCEDURE — 2500000004 HC RX 250 GENERAL PHARMACY W/ HCPCS (ALT 636 FOR OP/ED): Performed by: STUDENT IN AN ORGANIZED HEALTH CARE EDUCATION/TRAINING PROGRAM

## 2024-03-11 PROCEDURE — 71045 X-RAY EXAM CHEST 1 VIEW: CPT

## 2024-03-11 PROCEDURE — 99233 SBSQ HOSP IP/OBS HIGH 50: CPT | Performed by: NURSE PRACTITIONER

## 2024-03-11 PROCEDURE — 2500000004 HC RX 250 GENERAL PHARMACY W/ HCPCS (ALT 636 FOR OP/ED): Performed by: NURSE PRACTITIONER

## 2024-03-11 RX ORDER — KETOROLAC TROMETHAMINE 15 MG/ML
15 INJECTION, SOLUTION INTRAMUSCULAR; INTRAVENOUS EVERY 6 HOURS PRN
Status: DISCONTINUED | OUTPATIENT
Start: 2024-03-11 | End: 2024-03-11 | Stop reason: HOSPADM

## 2024-03-11 RX ORDER — BISACODYL 10 MG/1
20 SUPPOSITORY RECTAL ONCE
Status: DISCONTINUED | OUTPATIENT
Start: 2024-03-11 | End: 2024-03-11 | Stop reason: HOSPADM

## 2024-03-11 RX ORDER — AMOXICILLIN 250 MG
1 CAPSULE ORAL 2 TIMES DAILY
Qty: 30 TABLET | Refills: 0 | Status: SHIPPED | OUTPATIENT
Start: 2024-03-11

## 2024-03-11 RX ORDER — ACETAMINOPHEN 325 MG/1
650 TABLET ORAL EVERY 6 HOURS PRN
Start: 2024-03-11

## 2024-03-11 RX ORDER — LIDOCAINE 560 MG/1
1 PATCH PERCUTANEOUS; TOPICAL; TRANSDERMAL DAILY
Qty: 15 PATCH | Refills: 0 | Status: SHIPPED | OUTPATIENT
Start: 2024-03-11

## 2024-03-11 RX ORDER — OXYCODONE HCL 5 MG/5 ML
5 SOLUTION, ORAL ORAL EVERY 4 HOURS PRN
Status: DISCONTINUED | OUTPATIENT
Start: 2024-03-11 | End: 2024-03-11 | Stop reason: HOSPADM

## 2024-03-11 RX ORDER — LIDOCAINE 560 MG/1
1 PATCH PERCUTANEOUS; TOPICAL; TRANSDERMAL DAILY
Status: DISCONTINUED | OUTPATIENT
Start: 2024-03-11 | End: 2024-03-11 | Stop reason: HOSPADM

## 2024-03-11 RX ORDER — TRIPROLIDINE/PSEUDOEPHEDRINE 2.5MG-60MG
200 TABLET ORAL EVERY 6 HOURS PRN
Qty: 300 ML | Refills: 0 | Status: SHIPPED | OUTPATIENT
Start: 2024-03-11

## 2024-03-11 RX ORDER — OXYCODONE HCL 5 MG/5 ML
10 SOLUTION, ORAL ORAL EVERY 4 HOURS PRN
Status: DISCONTINUED | OUTPATIENT
Start: 2024-03-11 | End: 2024-03-11 | Stop reason: HOSPADM

## 2024-03-11 RX ORDER — OXYCODONE HCL 5 MG/5 ML
5 SOLUTION, ORAL ORAL EVERY 6 HOURS PRN
Qty: 110 ML | Refills: 0 | Status: SHIPPED | OUTPATIENT
Start: 2024-03-11 | End: 2024-03-18

## 2024-03-11 RX ADMIN — ACETAMINOPHEN 650 MG: 325 TABLET ORAL at 08:32

## 2024-03-11 RX ADMIN — LIDOCAINE 1 PATCH: 4 PATCH TOPICAL at 12:08

## 2024-03-11 RX ADMIN — PANTOPRAZOLE SODIUM 40 MG: 40 INJECTION, POWDER, FOR SOLUTION INTRAVENOUS at 08:26

## 2024-03-11 RX ADMIN — HEPARIN SODIUM 5000 UNITS: 5000 INJECTION INTRAVENOUS; SUBCUTANEOUS at 01:38

## 2024-03-11 RX ADMIN — OXYCODONE HYDROCHLORIDE 10 MG: 5 TABLET ORAL at 08:25

## 2024-03-11 RX ADMIN — Medication 0.5 L/MIN: at 08:00

## 2024-03-11 RX ADMIN — Medication 10 ML: at 08:31

## 2024-03-11 RX ADMIN — METHOCARBAMOL 1000 MG: 100 INJECTION, SOLUTION INTRAMUSCULAR; INTRAVENOUS at 14:34

## 2024-03-11 RX ADMIN — HEPARIN SODIUM 5000 UNITS: 5000 INJECTION INTRAVENOUS; SUBCUTANEOUS at 12:08

## 2024-03-11 RX ADMIN — ACETAMINOPHEN 650 MG: 325 TABLET ORAL at 01:38

## 2024-03-11 RX ADMIN — SENNOSIDES AND DOCUSATE SODIUM 1 TABLET: 8.6; 5 TABLET ORAL at 08:26

## 2024-03-11 RX ADMIN — OXYCODONE HYDROCHLORIDE 10 MG: 5 SOLUTION ORAL at 12:09

## 2024-03-11 RX ADMIN — LIDOCAINE 1 PATCH: 4 PATCH TOPICAL at 12:00

## 2024-03-11 ASSESSMENT — PAIN SCALES - GENERAL
PAINLEVEL_OUTOF10: 8
PAINLEVEL_OUTOF10: 5 - MODERATE PAIN
PAINLEVEL_OUTOF10: 7

## 2024-03-11 ASSESSMENT — PAIN DESCRIPTION - DESCRIPTORS
DESCRIPTORS: ACHING;PRESSURE
DESCRIPTORS: SORE;ACHING

## 2024-03-11 ASSESSMENT — PAIN - FUNCTIONAL ASSESSMENT
PAIN_FUNCTIONAL_ASSESSMENT: 0-10
PAIN_FUNCTIONAL_ASSESSMENT: 0-10

## 2024-03-11 NOTE — PROGRESS NOTES
"Thoracic Surgery Progress Note  3/11/2024    Jewel Rodriguez is a 34 y.o. female with history of VATs esophagectomy for achalasia who presented acutely with high grade closed loop small bowel obstruction secondary to large hiatal hernia now 7 Days Post-Op status post exploratory laparotomy, lysis of adhesions, para-conduit hiatal hernia repair without mesh, and EGD.    Overnight issues: Tolerating regular diet, passing flatus, moderate incisional pain    Physical Exam:  General: She is a pleasant female currently in no distress, resting comfortably in bed.  Flat, depressed affect.   /69   Pulse 105   Temp 37.2 °C (99 °F)   Resp 18   Ht 1.676 m (5' 6\")   Wt 62.8 kg (138 lb 6.4 oz)   SpO2 97%   BMI 22.34 kg/m²    Body mass index is 22.34 kg/m².   HEENT: Normocephalic and atraumatic.   NECK: Supple. Trach midline. No JVD.   CHEST: Breathing comfortably on room air  HEART: Regular rate and rhythm.   ABDOMEN: Soft, non-distended, appropriately tender to touch. Midline well approximated with staples, no surrounding erythema or drainage. + flatus, normoactive BS  : voiding per bathroom   NEUROLOGIC: Alert and oriented. Grossly intact.   EXTREMITIES: Moves all extremities equally.  Pedal pulses are palpable. No lower extremity edema. No calf tenderness.     Diagnostics:   Visit Vitals  /69   Pulse 105   Temp 37.2 °C (99 °F)   Resp 18   Ht 1.676 m (5' 6\")   Wt 62.8 kg (138 lb 6.4 oz)   SpO2 97%   BMI 22.34 kg/m²   Smoking Status Never   BSA 1.71 m²       Intake/Output Summary (Last 24 hours) at 3/11/2024 1144  Last data filed at 3/11/2024 0947  Gross per 24 hour   Intake 780 ml   Output 0 ml   Net 780 ml       Scheduled medications  acetaminophen, 650 mg, oral, q6h  bisacodyl, 20 mg, rectal, Once  heparin, 5,000 Units, subcutaneous, q8h  lidocaine, 1 patch, transdermal, Daily  lidocaine, 1 patch, transdermal, Daily  oxygen, , inhalation, Continuous - 02/gases  pantoprazole, 40 mg, intravenous, " Daily  scopolamine, 1 patch, transdermal, q72h  sennosides-docusate sodium, 1 tablet, oral, BID  sodium chloride 0.9%, 10 mL, intra-catheter, q12h      Continuous medications     PRN medications  PRN medications: albuterol, alteplase, ketorolac, methocarbamol, naloxone, ondansetron ODT **OR** ondansetron, oxyCODONE, oxyCODONE, phenoL, promethazine, sodium chloride 0.9%    No results found for this or any previous visit (from the past 24 hour(s)).    XR chest 1 view 03/11/2024    Narrative  Interpreted By:  Jonnie Shen,  STUDY:  XR CHEST 1 VIEW;  3/11/2024 3:29 am    INDICATION:  Signs/Symptoms:s/p esophagectomy 2017, paraconduit hernia repair 3/4.    COMPARISON:  03/09/2024    ACCESSION NUMBER(S):  [id number]    ORDERING CLINICIAN:  SHANNAN COLORADO    FINDINGS:  AP radiograph of the chest was provided.    CARDIOMEDIASTINAL SILHOUETTE:  Cardiomediastinal silhouette is stable in size and configuration.  Right pericardiac density related to patient's prior esophagectomy  and pull-through are again noted    LUNGS:  Basal atelectatic changes on the left side are again noted. No new  parenchymal abnormality has appeared.    ABDOMEN:  No remarkable upper abdominal findings.    BONES:  No acute osseous changes.    Impression  1.  Stable postoperative appearance of the chest.        MACRO:  None    Signed by: Jonnie Shen 3/11/2024 11:23 AM  Dictation workstation:   [id number]        Assessment:  Jewel Rodriguez is a 34 y.o. female status post exploratory laparotomy, lysis of adhesions, para-conduit hiatal hernia repair without mesh, and EGD on 3/4/24. Satisfactory post op course.     3/6/2024: Chest tube removed. NGT pulled back to 50 cm.   3/7/2024; some ROBF, but still with high NGT outputs. Abd corinna drain removed.   3/8/24: NGT output ~1.4L over past 24 hours.  Patient with continued c/o NGT discomfort. NGT placed gravity drainage. Endorses + flatus and denies nausea this am.   3/9: Denies nausea/vomiting,  +flatus, started CLD  3/10: +flatus/BM, advance diet    Plan:  Neurology: post operative pain  -Reasonable control on current regimen of oral tylenol and oxycodone, monitor effectiveness and adjust dose as needed   -Continue Robaxin , Toradol as needed  -Lidocaine patch to incisions  -Bowel regimen while on narcotics, bisacodyl suppository today    -Out of bed to the chair throughout the day  -Encourage ambulation as tolerated    Cardiovascular:   Normotensive, NSR on telemetry   -Continue telemetry  -Vital signs every four hours  -Replace electrolytes as needed for K >4, Mg >2    Pulmonology: chest tube management   -Chest tube removed 3/6/24  -Encourage incentive spirometer use every hour  -Continue pulmonary hygiene  -Nebs as needed   -Obtain daily CXR    Gastrointestinal: Achalasia s/p remote h/o esophagectomy presented with large herniation of colon and bowel through hiatus s/p repair. Post op ileus, having return of bowel function, tolerating oral diet   - Regular diet as tolerated   - Zofran available for nausea, phenergan added for 2nd line. Scop patch added today.   - Monitor bowel function and for nausea  - Discontinued abdominal drain 3/7    Genitourinary:   -Voiding spontaneously   -Continue to monitor daily electrolytes with routine BMPs   -Adequate urine output    Infectious Disease:   -Continue to trend daily temperatures and WBC count to monitor for signs of post-operative infection, trending down today   -Monitor surgical incisions for signs of infection   -Discontinued IV Zosyn- completed 4d course .     Hematology:   -Monitor for signs of acute blood loss  -Trend CBCs     Endocrine:   - Blood glucose stable, no changes at this time    DVT Prophylaxis:   -Continue subcutaneous heparin and SCDs, early ambulation    Disposition:  -Plan for discharge to home today and follow up with Dr. Bansal in 2 weeks with CXR  -No home-going needs identified     Patient seen by this provider and discussed with an  attending surgeon Dr. Bansal.     Thelma Jones, APRN-CNP  Thoracic Surgery [id number]

## 2024-03-11 NOTE — PROGRESS NOTES
"Jewel Rodriguez is a 34 y.o. female on day 7 of admission presenting with Hiatal hernia with obstruction but no gangrene.  Transitional Care Coordination Progress Note:   Patient discussed during interdisciplinary rounds.   Team members present: (TCC, Thoracic-NP)   Plan per Medical/Surgical team: (Thoracic surgery- post-op infection- monitoring WBC's)   Discharge disposition: (home no needs)   Status- In patient   Payer- [facility]  Potential Barriers: (Stable WBC's)   ADOD: (1-2 days   .Delia Segura RN [facility] [phone]       Physical Exam    Last Recorded Vitals  Blood pressure 110/73, pulse 81, temperature 37 °C (98.6 °F), temperature source Temporal, resp. rate 16, height 1.676 m (5' 6\"), weight 62.8 kg (138 lb 6.4 oz), SpO2 98 %.  Intake/Output last 3 Shifts:  I/O last 3 completed shifts:  In: 600 (9.6 mL/kg) [P.O.:600]  Out: - (0 mL/kg)   Weight: 62.8 kg         Assessment/Plan   Principal Problem:    Hiatal hernia with obstruction but no gangrene         Delia Segura RN      "

## 2024-03-11 NOTE — DISCHARGE SUMMARY
"Discharge Diagnosis  Hiatal hernia with obstruction but no gangrene    Issues Requiring Follow-Up  Routine follow up     Test Results Pending At Discharge  Pending Labs       Order Current Status    Surgical Pathology Exam In process            Hospital Course  Jewel Rodriguez is a 34 y.o. female with history of end-stage achalasia requiring esophagectomy in 2017 who presented with a 1 day history of nausea vomiting and abdominal pain.  Workup showed large pericardial hernia with small bowel and colon in the left chest.  The bowel was dilated in the chest concerning for close loop obstruction and ischemia. She underwent urgent  exploratory laparotomy, lysis of adhesions, para-conduit hiatal hernia repair without mesh, and EGD on 3/4/24. Satisfactory post op course.      3/6/2024: Chest tube removed. NGT pulled back to 50 cm.   3/7/2024; some ROBF, but still with high NGT outputs. Abd corinna drain removed.   3/8/24: NGT output ~1.4L over past 24 hours.  Patient with continued c/o NGT discomfort. NGT placed gravity drainage. Endorses + flatus and denies nausea this am.   3/9: Denies nausea/vomiting, +flatus, started CLD  3/10: +flatus/BM, advance diet  3/11/24 patient was deemed stable for discharge.      At the time of discharge, her pain was controlled on an oral pain regimen, She was breathing comfortably on room air.  Shewas ambulating independently.  She was tolerating a regular diet without nausea. She was voiding and moving bowels regularly.      The patient was educated on post operative activity restrictions, dietary guidelines, and pain management. She will follow up with Dr. Bansal in the outpatient setting in two weeks with a CXR just prior to this visit.         Pertinent Physical Exam At Time of Discharge  General: She is a pleasant female currently in no distress, resting comfortably in bed.  Flat, depressed affect.   /69   Pulse 105   Temp 37.2 °C (99 °F)   Resp 18   Ht 1.676 m (5' 6\")   Wt " 62.8 kg (138 lb 6.4 oz)   SpO2 97%   BMI 22.34 kg/m²    Body mass index is 22.34 kg/m².   HEENT: Normocephalic and atraumatic.   NECK: Supple. Trach midline. No JVD.   CHEST: Breathing comfortably on room air  HEART: Regular rate and rhythm.   ABDOMEN: Soft, non-distended, appropriately tender to touch. Midline well approximated with staples, no surrounding erythema or drainage. + flatus, normoactive BS  : voiding per bathroom   NEUROLOGIC: Alert and oriented. Grossly intact.   EXTREMITIES: Moves all extremities equally.  Pedal pulses are palpable. No lower extremity edema. No calf tenderness.     Home Medications     Medication List      START taking these medications     acetaminophen 325 mg tablet; Commonly known as: Tylenol; Take 2 tablets   (650 mg) by mouth every 6 hours if needed for mild pain (1 - 3).   ibuprofen 100 mg/5 mL suspension; Take 10 mL (200 mg) by mouth every 6   hours if needed for mild pain (1 - 3).   lidocaine 4 % patch; Place 1 patch over 12 hours on the skin once daily.   Remove & discard patch within 12 hours or as directed by MD.   oxyCODONE 5 mg/5 mL solution; Commonly known as: Roxicodone; Take 5 mL   (5 mg) by mouth every 6 hours if needed for moderate pain (4 - 6) for up   to 7 days.   sennosides-docusate sodium 8.6-50 mg tablet; Commonly known as:   Tiara-Colace; Take 1 tablet by mouth 2 times a day.       Outpatient Follow-Up  Future Appointments   Date Time Provider Department [place]   3/27/2024 10:00 AM [id number]       Thelma Jones, APRN-CNP  Thoracic Surgery [id number]

## 2024-03-13 LAB
LABORATORY COMMENT REPORT: NORMAL
PATH REPORT.FINAL DX SPEC: NORMAL
PATH REPORT.GROSS SPEC: NORMAL
PATH REPORT.MICROSCOPIC SPEC OTHER STN: NORMAL
PATH REPORT.RELEVANT HX SPEC: NORMAL
PATH REPORT.TOTAL CANCER: NORMAL

## 2024-03-20 ENCOUNTER — OFFICE VISIT (OUTPATIENT)
Dept: SURGERY | Facility: HOSPITAL | Age: 34
End: 2024-03-20
Payer: MEDICAID

## 2024-03-20 ENCOUNTER — HOSPITAL ENCOUNTER (OUTPATIENT)
Dept: RADIOLOGY | Facility: HOSPITAL | Age: 34
Discharge: HOME | End: 2024-03-20
Payer: MEDICAID

## 2024-03-20 VITALS
WEIGHT: 134.4 LBS | BODY MASS INDEX: 21.09 KG/M2 | DIASTOLIC BLOOD PRESSURE: 72 MMHG | RESPIRATION RATE: 18 BRPM | HEART RATE: 101 BPM | OXYGEN SATURATION: 99 % | SYSTOLIC BLOOD PRESSURE: 113 MMHG | HEIGHT: 67 IN | TEMPERATURE: 97.7 F

## 2024-03-20 DIAGNOSIS — K44.9 HIATAL HERNIA: ICD-10-CM

## 2024-03-20 DIAGNOSIS — K44.9 HIATAL HERNIA: Primary | ICD-10-CM

## 2024-03-20 PROCEDURE — 71046 X-RAY EXAM CHEST 2 VIEWS: CPT | Performed by: RADIOLOGY

## 2024-03-20 PROCEDURE — 1036F TOBACCO NON-USER: CPT | Performed by: STUDENT IN AN ORGANIZED HEALTH CARE EDUCATION/TRAINING PROGRAM

## 2024-03-20 PROCEDURE — 99024 POSTOP FOLLOW-UP VISIT: CPT | Performed by: STUDENT IN AN ORGANIZED HEALTH CARE EDUCATION/TRAINING PROGRAM

## 2024-03-20 PROCEDURE — 71046 X-RAY EXAM CHEST 2 VIEWS: CPT

## 2024-03-20 ASSESSMENT — PATIENT HEALTH QUESTIONNAIRE - PHQ9
SUM OF ALL RESPONSES TO PHQ9 QUESTIONS 1 AND 2: 0
2. FEELING DOWN, DEPRESSED OR HOPELESS: NOT AT ALL
1. LITTLE INTEREST OR PLEASURE IN DOING THINGS: NOT AT ALL

## 2024-03-20 ASSESSMENT — COLUMBIA-SUICIDE SEVERITY RATING SCALE - C-SSRS
6. HAVE YOU EVER DONE ANYTHING, STARTED TO DO ANYTHING, OR PREPARED TO DO ANYTHING TO END YOUR LIFE?: NO
2. HAVE YOU ACTUALLY HAD ANY THOUGHTS OF KILLING YOURSELF?: NO
1. IN THE PAST MONTH, HAVE YOU WISHED YOU WERE DEAD OR WISHED YOU COULD GO TO SLEEP AND NOT WAKE UP?: NO

## 2024-03-20 ASSESSMENT — PAIN SCALES - GENERAL: PAINLEVEL: 3

## 2024-03-20 ASSESSMENT — ENCOUNTER SYMPTOMS: DEPRESSION: 0

## 2024-03-27 ENCOUNTER — APPOINTMENT (OUTPATIENT)
Dept: SURGERY | Facility: HOSPITAL | Age: 34
End: 2024-03-27
Payer: MEDICAID

## 2024-08-30 NOTE — PROGRESS NOTES
Subjective   Jewel Rodriguez  is a 34 y.o. female with a pmhx of achalasia status post three-field esophagectomy c/b para-conduit hiatal hernia who presents today for postop follow up. She has recovered from surgery.     There is no significant change in patient's past medical history, past surgical history, social history, family history, or review of systems since last visit.     Objective   Visit Vitals  /72 (BP Location: Left arm, Patient Position: Sitting, BP Cuff Size: Adult)   Pulse 101   Temp 36.5 °C (97.7 °F)   Resp 18     Physical Exam  Vitals reviewed.   Constitutional:       Appearance: Normal appearance.   HENT:      Head: Normocephalic and atraumatic.   Eyes:      General: No scleral icterus.     Extraocular Movements: Extraocular movements intact.      Pupils: Pupils are equal, round, and reactive to light.   Cardiovascular:      Rate and Rhythm: Normal rate and regular rhythm.      Heart sounds: No murmur heard.  Pulmonary:      Effort: Pulmonary effort is normal.      Breath sounds: Normal breath sounds. No wheezing.   Abdominal:      General: Abdomen is flat. There is no distension.      Palpations: Abdomen is soft.      Tenderness: There is no abdominal tenderness. There is no guarding.      Comments: Midline incision c/d/I.    Musculoskeletal:         General: No swelling or tenderness. Normal range of motion.      Cervical back: Normal range of motion and neck supple.   Skin:     General: Skin is warm and dry.      Coloration: Skin is not jaundiced.   Neurological:      General: No focal deficit present.      Mental Status: She is alert and oriented to person, place, and time. Mental status is at baseline.   Psychiatric:         Mood and Affect: Mood normal.         Behavior: Behavior normal.           Diagnostic Review  I have reviewed CXR from today and compared it to CXR from inpatient. It showed clean lung fields. Conduit is less dilated today and straight.  I reviewed the operative  note. It showed para conduit hernia with transverse colon and small bowel in the left chest with closed-loop obstruction.  Small bowel and colon reduced back into the abdomen without requiring bowel resection.  More than 90 minutes of lysis of adhesions was performed due to prior surgery.  Conduit was healthy in end of the case.   I reviewed the pathology report. 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       Assessment/Plan   Jewel Rodriguez  is doing well. She has poor appetite but otherwise doing well. Her soreness in the chest will improve with time since she did not need a chest tube during the surgery. I recommend follow up in one year.     David Bansal MD  Thoracic Surgeon  [facility]   of Medicine  [facility]  Office phone: [phone]  Fax: [phone]  Pager: [phone]     Patient requests all Lab, Cardiology, and Radiology Results on their Discharge Instructions

## 2024-12-16 ENCOUNTER — DOCUMENTATION (OUTPATIENT)
Dept: OBSTETRICS AND GYNECOLOGY | Facility: HOSPITAL | Age: 34
End: 2024-12-16

## 2024-12-16 ENCOUNTER — INITIAL PRENATAL (OUTPATIENT)
Dept: MATERNAL FETAL MEDICINE | Facility: HOSPITAL | Age: 34
End: 2024-12-16
Payer: MEDICAID

## 2024-12-16 ENCOUNTER — LAB (OUTPATIENT)
Dept: LAB | Facility: LAB | Age: 34
End: 2024-12-16
Payer: MEDICAID

## 2024-12-16 ENCOUNTER — HOSPITAL ENCOUNTER (OUTPATIENT)
Dept: RADIOLOGY | Facility: HOSPITAL | Age: 34
Discharge: HOME | End: 2024-12-16
Payer: MEDICAID

## 2024-12-16 VITALS — SYSTOLIC BLOOD PRESSURE: 108 MMHG | BODY MASS INDEX: 23.14 KG/M2 | DIASTOLIC BLOOD PRESSURE: 67 MMHG | WEIGHT: 149 LBS

## 2024-12-16 DIAGNOSIS — R79.89 ELEVATED SERUM CREATININE: Primary | ICD-10-CM

## 2024-12-16 DIAGNOSIS — F41.9 ANXIETY IN PREGNANCY IN FIRST TRIMESTER, ANTEPARTUM (HHS-HCC): Primary | ICD-10-CM

## 2024-12-16 DIAGNOSIS — Z98.890 HISTORY OF ESOPHAGECTOMY: ICD-10-CM

## 2024-12-16 DIAGNOSIS — Z3A.01 7 WEEKS GESTATION OF PREGNANCY (HHS-HCC): ICD-10-CM

## 2024-12-16 DIAGNOSIS — K22.0 ACHALASIA OF ESOPHAGUS: ICD-10-CM

## 2024-12-16 DIAGNOSIS — O99.341 ANXIETY IN PREGNANCY IN FIRST TRIMESTER, ANTEPARTUM (HHS-HCC): Primary | ICD-10-CM

## 2024-12-16 DIAGNOSIS — O34.219 HISTORY OF CESAREAN DELIVERY AFFECTING PREGNANCY (HHS-HCC): ICD-10-CM

## 2024-12-16 DIAGNOSIS — Z3A.01 LESS THAN 8 WEEKS GESTATION OF PREGNANCY (HHS-HCC): ICD-10-CM

## 2024-12-16 DIAGNOSIS — Z90.49 HISTORY OF ESOPHAGECTOMY: ICD-10-CM

## 2024-12-16 LAB
ABO GROUP (TYPE) IN BLOOD: NORMAL
ALBUMIN SERPL BCP-MCNC: 4.2 G/DL (ref 3.4–5)
ALP SERPL-CCNC: 52 U/L (ref 33–110)
ALT SERPL W P-5'-P-CCNC: 11 U/L (ref 7–45)
ANION GAP SERPL CALC-SCNC: 13 MMOL/L (ref 10–20)
ANTIBODY SCREEN: NORMAL
AST SERPL W P-5'-P-CCNC: 14 U/L (ref 9–39)
BILIRUB SERPL-MCNC: 0.3 MG/DL (ref 0–1.2)
BUN SERPL-MCNC: 14 MG/DL (ref 6–23)
CALCIUM SERPL-MCNC: 9.3 MG/DL (ref 8.6–10.6)
CHLORIDE SERPL-SCNC: 104 MMOL/L (ref 98–107)
CO2 SERPL-SCNC: 22 MMOL/L (ref 21–32)
CREAT SERPL-MCNC: 1.09 MG/DL (ref 0.5–1.05)
EGFRCR SERPLBLD CKD-EPI 2021: 69 ML/MIN/1.73M*2
ERYTHROCYTE [DISTWIDTH] IN BLOOD BY AUTOMATED COUNT: 16.6 % (ref 11.5–14.5)
EST. AVERAGE GLUCOSE BLD GHB EST-MCNC: 103 MG/DL
GLUCOSE SERPL-MCNC: 97 MG/DL (ref 74–99)
HBA1C MFR BLD: 5.2 %
HBV SURFACE AG SERPL QL IA: NONREACTIVE
HCT VFR BLD AUTO: 36.2 % (ref 36–46)
HCV AB SER QL: NONREACTIVE
HGB BLD-MCNC: 11.4 G/DL (ref 12–16)
HIV 1+2 AB+HIV1 P24 AG SERPL QL IA: NONREACTIVE
MCH RBC QN AUTO: 26.4 PG (ref 26–34)
MCHC RBC AUTO-ENTMCNC: 31.5 G/DL (ref 32–36)
MCV RBC AUTO: 84 FL (ref 80–100)
NRBC BLD-RTO: 0 /100 WBCS (ref 0–0)
PLATELET # BLD AUTO: 303 X10*3/UL (ref 150–450)
POTASSIUM SERPL-SCNC: 4.5 MMOL/L (ref 3.5–5.3)
PROT SERPL-MCNC: 7.3 G/DL (ref 6.4–8.2)
RBC # BLD AUTO: 4.32 X10*6/UL (ref 4–5.2)
RH FACTOR (ANTIGEN D): NORMAL
RUBV IGG SERPL IA-ACNC: 2.2 IA
RUBV IGG SERPL QL IA: POSITIVE
SODIUM SERPL-SCNC: 134 MMOL/L (ref 136–145)
TREPONEMA PALLIDUM IGG+IGM AB [PRESENCE] IN SERUM OR PLASMA BY IMMUNOASSAY: NONREACTIVE
WBC # BLD AUTO: 10.8 X10*3/UL (ref 4.4–11.3)

## 2024-12-16 PROCEDURE — 85027 COMPLETE CBC AUTOMATED: CPT

## 2024-12-16 PROCEDURE — 87086 URINE CULTURE/COLONY COUNT: CPT | Performed by: STUDENT IN AN ORGANIZED HEALTH CARE EDUCATION/TRAINING PROGRAM

## 2024-12-16 PROCEDURE — 86901 BLOOD TYPING SEROLOGIC RH(D): CPT

## 2024-12-16 PROCEDURE — 86317 IMMUNOASSAY INFECTIOUS AGENT: CPT

## 2024-12-16 PROCEDURE — 86850 RBC ANTIBODY SCREEN: CPT

## 2024-12-16 PROCEDURE — 87340 HEPATITIS B SURFACE AG IA: CPT

## 2024-12-16 PROCEDURE — 87491 CHLMYD TRACH DNA AMP PROBE: CPT | Performed by: STUDENT IN AN ORGANIZED HEALTH CARE EDUCATION/TRAINING PROGRAM

## 2024-12-16 PROCEDURE — 86900 BLOOD TYPING SEROLOGIC ABO: CPT

## 2024-12-16 PROCEDURE — 80053 COMPREHEN METABOLIC PANEL: CPT

## 2024-12-16 PROCEDURE — 87389 HIV-1 AG W/HIV-1&-2 AB AG IA: CPT

## 2024-12-16 PROCEDURE — 36415 COLL VENOUS BLD VENIPUNCTURE: CPT

## 2024-12-16 PROCEDURE — 86780 TREPONEMA PALLIDUM: CPT

## 2024-12-16 PROCEDURE — 99215 OFFICE O/P EST HI 40 MIN: CPT | Performed by: STUDENT IN AN ORGANIZED HEALTH CARE EDUCATION/TRAINING PROGRAM

## 2024-12-16 PROCEDURE — 86803 HEPATITIS C AB TEST: CPT

## 2024-12-16 PROCEDURE — 83036 HEMOGLOBIN GLYCOSYLATED A1C: CPT

## 2024-12-16 PROCEDURE — 76801 OB US < 14 WKS SINGLE FETUS: CPT | Performed by: OBSTETRICS & GYNECOLOGY

## 2024-12-16 PROCEDURE — 76801 OB US < 14 WKS SINGLE FETUS: CPT

## 2024-12-16 RX ORDER — SERTRALINE HYDROCHLORIDE 50 MG/1
50 TABLET, FILM COATED ORAL DAILY
Qty: 60 TABLET | Refills: 3 | Status: SHIPPED | OUTPATIENT
Start: 2024-12-16 | End: 2025-08-13

## 2024-12-16 RX ORDER — PNV NO.95/FERROUS FUM/FOLIC AC 28MG-0.8MG
1 TABLET ORAL DAILY
COMMUNITY

## 2024-12-16 ASSESSMENT — EDINBURGH POSTNATAL DEPRESSION SCALE (EPDS)
I HAVE LOOKED FORWARD WITH ENJOYMENT TO THINGS: AS MUCH AS I EVER DID
I HAVE BEEN ABLE TO LAUGH AND SEE THE FUNNY SIDE OF THINGS: AS MUCH AS I ALWAYS COULD
I HAVE FELT SAD OR MISERABLE: NO, NOT AT ALL
I HAVE BEEN SO UNHAPPY THAT I HAVE BEEN CRYING: NO, NEVER
THE THOUGHT OF HARMING MYSELF HAS OCCURRED TO ME: NEVER
I HAVE BEEN SO UNHAPPY THAT I HAVE HAD DIFFICULTY SLEEPING: NOT AT ALL
TOTAL SCORE: 4
I HAVE FELT SCARED OR PANICKY FOR NO GOOD REASON: YES, SOMETIMES
THINGS HAVE BEEN GETTING ON TOP OF ME: NO, MOST OF THE TIME I HAVE COPED QUITE WELL
I HAVE BEEN ANXIOUS OR WORRIED FOR NO GOOD REASON: NO, NOT AT ALL
I HAVE BLAMED MYSELF UNNECESSARILY WHEN THINGS WENT WRONG: NOT VERY OFTEN

## 2024-12-16 NOTE — PROGRESS NOTES
Email sent to  IOP by request of Dr. Villavicencio for panic attacks. Pt seen in the office for the first time today.  GUEVARA Barcenas

## 2024-12-16 NOTE — PROGRESS NOTES
Jewel Rodriguez is a 33 yo  @ 7w6d who presents for a NOB. Pregnancy complicated by a hx of achalasia s/p thoracoscopic esophagectomy with pylorotomy and feeding jejunostomy and CD x1 with a successful  x3.    Jewel has a long history of achalasia since childhood requiring multiple surgeries. Most recently in 3/2024 she had a para-conduit hiatal hernia repair without mesh, redo laparotomy and lysis of adhesions following admission for nausea and vomiting. Since this time she has done well.     She denies VB or cramping. Having intermittent panic attacks. No other concerns. Her obstetric history is as below:    OBHX:   1st (): TSVD   2nd (): PLTCD @ 39 weeks in the setting of a placental abruption (operative report reviewed). Antepartum course complicated by an esophageal ulcer   3rd: SAB   4th: SAB   5th ():    6th ():   @ 36 weeks   7th: current    PMHX: achalasia, panic attacks, anemia  PSHX: PLTCD, achalasia s/p multiple surgeries, hiatal hernia repair  GYN: no STIs  MEDS: PNV  ALL: NKDA  SOCIAL: no tobacco/alcohol/illicit drug use  FAMILY: history of diabetes    O: /67   Wt 67.6 kg (149 lb)   BMI 23.14 kg/m²   Gen: NAD  Resp: nonlabored breathing  Cardiac: good peripheral perfusion  Abd: gravid  Psych: appropriate mood and affect  FHTs: +FCA on U/S    A/P: Jewel Rodriguez is a 33 yo  @ 7w6d who presents for a NOB. Pregnancy complicated by a hx of achalasia s/p thoracoscopic esophagectomy with pylorotomy and feeding jejunostomy and CD x1 with a successful  x3.    Hx of Achalasia s/p repair  - currently asymptomatic   - discussed low-threshold to present to the ED/OB Triage for N/V due to patient's history    Hx of  x3  - MFMU calculator suggests a 96.1% success rate  - Jewel was counseled that a trial of labor carries the risk of a failed attempt requiring a repeat  and uterine rupture. The uterine rupture risk in women with one prior   is approximately 0.7%  - we reviewed the nature of the maternal and fetal risks associated with uterine rupture including bladder and bowel injury and fetal asphyxia  - following our discussion Loliice desires to TOLAC  - will maximize hgb prior to delivery    Panic Attacks  - will start Zoloft 25 mg and uptitrate to 50 mg daily  - declines a Behavioral Health referral    Prior  Delivery at 36 weeks gestation  - pt declines serial cervical length ultrasounds    PNC   - PNLs ordered today  - last pap . Will repeat today  - plan for NT at 12 weeks  - pt desires cfDNA    Dispo - RTC in 5 weeks for a TIM and NT    Pt seen with Dr. Linda Domínguez (MFM Fellow)    Dharmesh Villavicencio MD  Maternal-Fetal Medicine

## 2024-12-17 LAB
BACTERIA UR CULT: NORMAL
C TRACH RRNA SPEC QL NAA+PROBE: NEGATIVE
N GONORRHOEA DNA SPEC QL PROBE+SIG AMP: NEGATIVE

## 2025-01-02 ENCOUNTER — APPOINTMENT (OUTPATIENT)
Dept: BEHAVIORAL HEALTH | Facility: CLINIC | Age: 35
End: 2025-01-02
Payer: MEDICAID

## 2025-01-02 LAB
CYTOLOGY CMNT CVX/VAG CYTO-IMP: NORMAL
HPV HR 12 DNA GENITAL QL NAA+PROBE: NEGATIVE
HPV HR GENOTYPES PNL CVX NAA+PROBE: NEGATIVE
HPV16 DNA SPEC QL NAA+PROBE: NEGATIVE
HPV18 DNA SPEC QL NAA+PROBE: NEGATIVE
LAB AP HPV GENOTYPE QUESTION: YES
LAB AP HPV HR: NORMAL
LABORATORY COMMENT REPORT: NORMAL
PATH REPORT.TOTAL CANCER: NORMAL

## 2025-01-03 ENCOUNTER — APPOINTMENT (OUTPATIENT)
Dept: BEHAVIORAL HEALTH | Facility: CLINIC | Age: 35
End: 2025-01-03
Payer: MEDICAID

## 2025-01-13 ENCOUNTER — LAB (OUTPATIENT)
Dept: LAB | Facility: LAB | Age: 35
End: 2025-01-13
Payer: MEDICAID

## 2025-01-13 ENCOUNTER — OFFICE VISIT (OUTPATIENT)
Dept: MATERNAL FETAL MEDICINE | Facility: HOSPITAL | Age: 35
End: 2025-01-13
Payer: MEDICAID

## 2025-01-13 ENCOUNTER — HOSPITAL ENCOUNTER (OUTPATIENT)
Dept: RADIOLOGY | Facility: HOSPITAL | Age: 35
Discharge: HOME | End: 2025-01-13
Payer: MEDICAID

## 2025-01-13 VITALS — DIASTOLIC BLOOD PRESSURE: 71 MMHG | WEIGHT: 139 LBS | BODY MASS INDEX: 21.59 KG/M2 | SYSTOLIC BLOOD PRESSURE: 117 MMHG

## 2025-01-13 DIAGNOSIS — O21.9 NAUSEA AND VOMITING IN PREGNANCY PRIOR TO 22 WEEKS GESTATION: ICD-10-CM

## 2025-01-13 DIAGNOSIS — Z90.49 HISTORY OF ESOPHAGECTOMY: Primary | ICD-10-CM

## 2025-01-13 DIAGNOSIS — O99.341 ANXIETY IN PREGNANCY IN FIRST TRIMESTER, ANTEPARTUM (HHS-HCC): ICD-10-CM

## 2025-01-13 DIAGNOSIS — Z3A.12 12 WEEKS GESTATION OF PREGNANCY (HHS-HCC): ICD-10-CM

## 2025-01-13 DIAGNOSIS — R79.89 ELEVATED SERUM CREATININE: ICD-10-CM

## 2025-01-13 DIAGNOSIS — O34.219 HISTORY OF CESAREAN DELIVERY AFFECTING PREGNANCY (HHS-HCC): ICD-10-CM

## 2025-01-13 DIAGNOSIS — Z3A.01 LESS THAN 8 WEEKS GESTATION OF PREGNANCY (HHS-HCC): ICD-10-CM

## 2025-01-13 DIAGNOSIS — K22.0 ACHALASIA OF ESOPHAGUS: ICD-10-CM

## 2025-01-13 DIAGNOSIS — F41.9 ANXIETY IN PREGNANCY IN FIRST TRIMESTER, ANTEPARTUM (HHS-HCC): ICD-10-CM

## 2025-01-13 DIAGNOSIS — Z98.890 HISTORY OF ESOPHAGECTOMY: Primary | ICD-10-CM

## 2025-01-13 LAB
ALBUMIN SERPL BCP-MCNC: 4.5 G/DL (ref 3.4–5)
ALP SERPL-CCNC: 62 U/L (ref 33–110)
ALT SERPL W P-5'-P-CCNC: 12 U/L (ref 7–45)
ANION GAP SERPL CALC-SCNC: 18 MMOL/L (ref 10–20)
AST SERPL W P-5'-P-CCNC: 16 U/L (ref 9–39)
BILIRUB SERPL-MCNC: 0.3 MG/DL (ref 0–1.2)
BUN SERPL-MCNC: 9 MG/DL (ref 6–23)
CALCIUM SERPL-MCNC: 9.7 MG/DL (ref 8.6–10.6)
CHLORIDE SERPL-SCNC: 102 MMOL/L (ref 98–107)
CO2 SERPL-SCNC: 17 MMOL/L (ref 21–32)
CREAT SERPL-MCNC: 0.6 MG/DL (ref 0.5–1.05)
EGFRCR SERPLBLD CKD-EPI 2021: >90 ML/MIN/1.73M*2
GLUCOSE SERPL-MCNC: 83 MG/DL (ref 74–99)
POTASSIUM SERPL-SCNC: 4.2 MMOL/L (ref 3.5–5.3)
PROT SERPL-MCNC: 7.8 G/DL (ref 6.4–8.2)
SODIUM SERPL-SCNC: 133 MMOL/L (ref 136–145)

## 2025-01-13 PROCEDURE — 99214 OFFICE O/P EST MOD 30 MIN: CPT | Mod: TH | Performed by: STUDENT IN AN ORGANIZED HEALTH CARE EDUCATION/TRAINING PROGRAM

## 2025-01-13 PROCEDURE — 76813 OB US NUCHAL MEAS 1 GEST: CPT

## 2025-01-13 PROCEDURE — 80053 COMPREHEN METABOLIC PANEL: CPT

## 2025-01-13 PROCEDURE — 99214 OFFICE O/P EST MOD 30 MIN: CPT | Performed by: STUDENT IN AN ORGANIZED HEALTH CARE EDUCATION/TRAINING PROGRAM

## 2025-01-13 PROCEDURE — RXMED WILLOW AMBULATORY MEDICATION CHARGE

## 2025-01-13 PROCEDURE — 76813 OB US NUCHAL MEAS 1 GEST: CPT | Performed by: OBSTETRICS & GYNECOLOGY

## 2025-01-13 RX ORDER — ONDANSETRON 4 MG/1
4 TABLET, ORALLY DISINTEGRATING ORAL EVERY 8 HOURS PRN
Qty: 30 TABLET | Refills: 0 | Status: SHIPPED | OUTPATIENT
Start: 2025-01-13 | End: 2025-01-24

## 2025-01-13 NOTE — PROGRESS NOTES
Jewel Rodriguez is a 33 yo  @ 12w6d who presents for a TIM. Pregnancy complicated by a hx of achalasia s/p thoracoscopic esophagectomy with pylorotomy and feeding jejunostomy and CD x1 with a successful  x2.    She denies VB or cramping. Reports increased nausea. No other concerns.    O: /71   Wt 63 kg (139 lb)   BMI 21.59 kg/m²   Gen: NAD  Resp: nonlabored breathing  Cardiac: good peripheral perfusion  Abd: gravid  Psych: appropriate mood and affect  FHTs: +FCA on U/S     A/P: Jewel Rodriguez is a 33 yo  @ 12w6d who presents for a TIM. Pregnancy complicated by a hx of achalasia s/p thoracoscopic esophagectomy with pylorotomy and feeding jejunostomy and CD x1 with a successful  x2     Hx of Achalasia s/p repair  - long history of achalasia since childhood requiring multiple surgeries. Most recently in 3/2024 she had a para-conduit hiatal hernia repair without mesh, redo laparotomy and lysis of adhesions following admission for nausea and vomiting   - currently asymptomatic   - discussed low-threshold to present to the ED/OB Triage for N/V due to patient's history     Hx of  x2  - three total vaginal deliveries (one prior to her PLTCD)  - MFMU calculator suggests a 96.1% success rate  - Jewel was counseled that a trial of labor carries the risk of a failed attempt requiring a repeat  and uterine rupture. The uterine rupture risk in women with one prior  is approximately 0.7%  - previously reviewed the nature of the maternal and fetal risks associated with uterine rupture including bladder and bowel injury and fetal asphyxia  - following our discussion Jewel desires to TOLAC  - will maximize hgb prior to delivery     Panic Attacks  - increase to Zoloft 50 mg daily  - declines a Behavioral Health referral     Prior  Delivery at 36 weeks gestation  - pt declines serial cervical length ultrasounds     PNC   - O+, ab-, RI  - PNLs reviewed and wnl  - Creatinine  1.09. Will repeat  - last pap 12/2024 NILM with negative HPV  - NT wnl today  - cfDNA today  - prescription sent for Zofran ODT     Dispo - RTC in 4 weeks     Dharmesh Villavicencio MD  Maternal-Fetal Medicine

## 2025-01-14 ENCOUNTER — PHARMACY VISIT (OUTPATIENT)
Dept: PHARMACY | Facility: CLINIC | Age: 35
End: 2025-01-14
Payer: MEDICAID

## 2025-01-20 ENCOUNTER — PATIENT MESSAGE (OUTPATIENT)
Dept: MATERNAL FETAL MEDICINE | Facility: HOSPITAL | Age: 35
End: 2025-01-20
Payer: MEDICAID

## 2025-01-20 DIAGNOSIS — O21.9 NAUSEA AND VOMITING IN PREGNANCY PRIOR TO 22 WEEKS GESTATION: ICD-10-CM

## 2025-01-22 RX ORDER — ONDANSETRON 4 MG/1
4 TABLET, ORALLY DISINTEGRATING ORAL EVERY 8 HOURS PRN
Qty: 30 TABLET | Refills: 0 | Status: SHIPPED | OUTPATIENT
Start: 2025-01-22 | End: 2025-01-22 | Stop reason: SDUPTHER

## 2025-01-22 RX ORDER — ONDANSETRON 4 MG/1
4 TABLET, ORALLY DISINTEGRATING ORAL EVERY 8 HOURS PRN
Qty: 30 TABLET | Refills: 0 | Status: SHIPPED | OUTPATIENT
Start: 2025-01-22 | End: 2025-02-01

## 2025-01-24 LAB
COMMENTS - MP RESULT TYPE: NORMAL
SCAN RESULT: NORMAL

## 2025-02-10 ENCOUNTER — OFFICE VISIT (OUTPATIENT)
Dept: MATERNAL FETAL MEDICINE | Facility: HOSPITAL | Age: 35
End: 2025-02-10
Payer: MEDICAID

## 2025-02-10 VITALS — BODY MASS INDEX: 22.05 KG/M2 | SYSTOLIC BLOOD PRESSURE: 109 MMHG | WEIGHT: 142 LBS | DIASTOLIC BLOOD PRESSURE: 64 MMHG

## 2025-02-10 DIAGNOSIS — Z98.890 HISTORY OF ESOPHAGECTOMY: ICD-10-CM

## 2025-02-10 DIAGNOSIS — Z3A.15 15 WEEKS GESTATION OF PREGNANCY (HHS-HCC): ICD-10-CM

## 2025-02-10 DIAGNOSIS — O21.9 NAUSEA AND VOMITING IN PREGNANCY PRIOR TO 22 WEEKS GESTATION: Primary | ICD-10-CM

## 2025-02-10 DIAGNOSIS — K22.0 ACHALASIA OF ESOPHAGUS: ICD-10-CM

## 2025-02-10 DIAGNOSIS — O34.219 HISTORY OF CESAREAN DELIVERY AFFECTING PREGNANCY (HHS-HCC): ICD-10-CM

## 2025-02-10 DIAGNOSIS — Z90.49 HISTORY OF ESOPHAGECTOMY: ICD-10-CM

## 2025-02-10 PROCEDURE — 99214 OFFICE O/P EST MOD 30 MIN: CPT | Performed by: STUDENT IN AN ORGANIZED HEALTH CARE EDUCATION/TRAINING PROGRAM

## 2025-02-10 PROCEDURE — 99214 OFFICE O/P EST MOD 30 MIN: CPT | Mod: TH | Performed by: STUDENT IN AN ORGANIZED HEALTH CARE EDUCATION/TRAINING PROGRAM

## 2025-02-10 NOTE — PROGRESS NOTES
Jewel Rodriguez is a 35 yo  @ 15w6d who presents for a TIM. Pregnancy complicated by a hx of achalasia s/p thoracoscopic esophagectomy with pylorotomy and feeding jejunostomy and CD x1 with a successful  x2.     She denies VB or cramping. Having increased breast tenderness bilaterally and increased vaginal discharge. Having some concerns regarding how the pregnancy will effect her prior surgery. No other concerns.     O: /64   Wt 64.4 kg (142 lb)   BMI 22.05 kg/m²   Gen: NAD  Resp: nonlabored breathing  Cardiac: good peripheral perfusion  Abd: gravid  Psych: appropriate mood and affect  FHTs: 143     A/P: Jewel Rodriguez is a 35 yo  @ 15w6d who presents for a TIM. Pregnancy complicated by a hx of achalasia s/p thoracoscopic esophagectomy with pylorotomy and feeding jejunostomy and CD x1 with a successful  x2     Hx of Achalasia s/p repair  - long history of achalasia since childhood requiring multiple surgeries. Most recently in 3/2024 she had a para-conduit hiatal hernia repair without mesh, redo laparotomy and lysis of adhesions following admission for nausea and vomiting   - currently asymptomatic   - discussed low-threshold to present to the ED/OB Triage for N/V due to patient's history  - I do not think that Jewel's prior surgeries are a contraindication to pregnancy, however, I discussed that I am happy to reach out to her Thoracic Surgeon to discuss her case further     Hx of  x2  - three total vaginal deliveries (one prior to her PLTCD)  - MFMU calculator suggests a 96.1% success rate  - Jewel was counseled that a trial of labor carries the risk of a failed attempt requiring a repeat  and uterine rupture. The uterine rupture risk in women with one prior  is approximately 0.7%  - previously reviewed the nature of the maternal and fetal risks associated with uterine rupture including bladder and bowel injury and fetal asphyxia  - following our discussion  Jewel desires to TOLAC  - will maximize hgb prior to delivery     Panic Attacks  - increased to Zoloft 50 mg daily  - declines a Behavioral Health referral     Prior  Delivery at 36 weeks gestation  - pt declines serial cervical length ultrasounds     PNC   - O+, ab-, RI  - PNLs reviewed and wnl  - Creatinine initially 1.09, however, repeat appropriate at 0.60  - last pap 2024 NILM with negative HPV  - NT wnl  - s/p LR cfDNA  - Zofran PRN for nausea     Dispo - RTC in 4 weeks     Dharmesh Villavicencio MD  Maternal-Fetal Medicine

## 2025-03-03 ENCOUNTER — APPOINTMENT (OUTPATIENT)
Dept: MATERNAL FETAL MEDICINE | Facility: HOSPITAL | Age: 35
End: 2025-03-03
Payer: MEDICAID

## 2025-03-10 ENCOUNTER — TELEPHONE (OUTPATIENT)
Dept: OBSTETRICS AND GYNECOLOGY | Facility: HOSPITAL | Age: 35
End: 2025-03-10
Payer: MEDICAID

## 2025-03-10 ENCOUNTER — HOSPITAL ENCOUNTER (OUTPATIENT)
Dept: RADIOLOGY | Facility: HOSPITAL | Age: 35
Discharge: HOME | End: 2025-03-10
Payer: MEDICAID

## 2025-03-10 DIAGNOSIS — Z3A.01 LESS THAN 8 WEEKS GESTATION OF PREGNANCY (HHS-HCC): ICD-10-CM

## 2025-03-10 PROCEDURE — 76811 OB US DETAILED SNGL FETUS: CPT

## 2025-03-10 NOTE — TELEPHONE ENCOUNTER
Called patient to assist with rescheduling missed 3/3 visit, per Dr. Villavicencio's request.   Patient scheduled for 3/17 at 0830, approved by Dr. Villavicencio.   Reviewed appt details, patient confirmed appt works for her.   Will continue to be available for coordination as needed.     Farideh Steve CNP  Complex/High Risk OB   [phone]

## 2025-03-16 NOTE — PROGRESS NOTES
Jewel Rodriguez is a 35 yo  @ 20w6d who presents for a TIM. Pregnancy complicated by a hx of achalasia s/p thoracoscopic esophagectomy with pylorotomy and feeding jejunostomy and CD x1 with a successful  x2.     She denies VB or cramping. No other concerns.     O: ***  Gen: NAD  Resp: nonlabored breathing  Cardiac: good peripheral perfusion  Abd: gravid  Psych: appropriate mood and affect  FHTs: +FCA on U/S     A/P: Jewel Rodriguez is a 35 yo  @ 20w6d who presents for a TIM. Pregnancy complicated by a hx of achalasia s/p thoracoscopic esophagectomy with pylorotomy and feeding jejunostomy and CD x1 with a successful  x2     Hx of Achalasia s/p repair  - long history of achalasia since childhood requiring multiple surgeries. Most recently in 3/2024 she had a para-conduit hiatal hernia repair without mesh, redo laparotomy and lysis of adhesions following admission for nausea and vomiting   - currently asymptomatic   - discussed low-threshold to present to the ED/OB Triage for N/V due to patient's history  - I do not think that Jewel's prior surgeries are a contraindication to pregnancy. Discussed with pt's thoracic surgeon, Dr. Basnal, who is in agreement. Appointment scheduled with Dr. Bansal on 3/19/25     Hx of  x2  - three total vaginal deliveries (one prior to her PLTCD)  - MFMU calculator suggests a 96.1% success rate  - Jewel was counseled that a trial of labor carries the risk of a failed attempt requiring a repeat  and uterine rupture. The uterine rupture risk in women with one prior  is approximately 0.7%  - previously reviewed the nature of the maternal and fetal risks associated with uterine rupture including bladder and bowel injury and fetal asphyxia  - following our discussion Jewel desires to TOLAC  - will maximize hgb prior to delivery     Panic Attacks  - increased to Zoloft 50 mg daily  - declines a Behavioral Health referral     Prior  Delivery at  36 weeks gestation  - pt declines serial cervical length ultrasounds     PNC   - O+, ab-, RI  - PNLs reviewed and wnl  - Creatinine initially 1.09, however, repeat appropriate at 0.60  - last pap 12/2024 NILM with negative HPV  - NT wnl  - s/p LR cfDNA  - Zofran PRN for nausea     Dispo - RTC in 4 weeks     Dharmesh Villavicencio MD  Maternal-Fetal Medicine

## 2025-03-17 ENCOUNTER — APPOINTMENT (OUTPATIENT)
Dept: MATERNAL FETAL MEDICINE | Facility: HOSPITAL | Age: 35
End: 2025-03-17
Payer: MEDICAID

## 2025-03-17 VITALS — SYSTOLIC BLOOD PRESSURE: 111 MMHG | DIASTOLIC BLOOD PRESSURE: 70 MMHG | WEIGHT: 147 LBS | BODY MASS INDEX: 22.83 KG/M2

## 2025-03-17 DIAGNOSIS — Z3A.20 20 WEEKS GESTATION OF PREGNANCY (HHS-HCC): ICD-10-CM

## 2025-03-17 DIAGNOSIS — K22.0 ACHALASIA OF ESOPHAGUS: ICD-10-CM

## 2025-03-17 DIAGNOSIS — Z98.890 HISTORY OF ESOPHAGECTOMY: Primary | ICD-10-CM

## 2025-03-17 DIAGNOSIS — O34.219 HISTORY OF CESAREAN DELIVERY AFFECTING PREGNANCY (HHS-HCC): ICD-10-CM

## 2025-03-17 DIAGNOSIS — Z90.49 HISTORY OF ESOPHAGECTOMY: Primary | ICD-10-CM

## 2025-03-17 PROCEDURE — 99214 OFFICE O/P EST MOD 30 MIN: CPT | Mod: TH | Performed by: STUDENT IN AN ORGANIZED HEALTH CARE EDUCATION/TRAINING PROGRAM

## 2025-03-17 PROCEDURE — 99214 OFFICE O/P EST MOD 30 MIN: CPT | Performed by: STUDENT IN AN ORGANIZED HEALTH CARE EDUCATION/TRAINING PROGRAM

## 2025-03-19 ENCOUNTER — OFFICE VISIT (OUTPATIENT)
Dept: SURGERY | Facility: HOSPITAL | Age: 35
End: 2025-03-19
Payer: MEDICAID

## 2025-03-19 VITALS
OXYGEN SATURATION: 98 % | SYSTOLIC BLOOD PRESSURE: 103 MMHG | RESPIRATION RATE: 16 BRPM | DIASTOLIC BLOOD PRESSURE: 51 MMHG | BODY MASS INDEX: 22.46 KG/M2 | HEART RATE: 101 BPM | WEIGHT: 144.62 LBS | TEMPERATURE: 97.7 F

## 2025-03-19 DIAGNOSIS — K44.9 HIATAL HERNIA: Primary | ICD-10-CM

## 2025-03-19 PROCEDURE — 99214 OFFICE O/P EST MOD 30 MIN: CPT | Performed by: STUDENT IN AN ORGANIZED HEALTH CARE EDUCATION/TRAINING PROGRAM

## 2025-03-19 PROCEDURE — 1036F TOBACCO NON-USER: CPT | Performed by: STUDENT IN AN ORGANIZED HEALTH CARE EDUCATION/TRAINING PROGRAM

## 2025-03-19 ASSESSMENT — PAIN SCALES - GENERAL: PAINLEVEL_OUTOF10: 0-NO PAIN

## 2025-03-19 ASSESSMENT — PATIENT HEALTH QUESTIONNAIRE - PHQ9
SUM OF ALL RESPONSES TO PHQ9 QUESTIONS 1 AND 2: 0
1. LITTLE INTEREST OR PLEASURE IN DOING THINGS: NOT AT ALL
2. FEELING DOWN, DEPRESSED OR HOPELESS: NOT AT ALL

## 2025-03-19 ASSESSMENT — COLUMBIA-SUICIDE SEVERITY RATING SCALE - C-SSRS
2. HAVE YOU ACTUALLY HAD ANY THOUGHTS OF KILLING YOURSELF?: NO
6. HAVE YOU EVER DONE ANYTHING, STARTED TO DO ANYTHING, OR PREPARED TO DO ANYTHING TO END YOUR LIFE?: NO
1. IN THE PAST MONTH, HAVE YOU WISHED YOU WERE DEAD OR WISHED YOU COULD GO TO SLEEP AND NOT WAKE UP?: NO

## 2025-03-19 NOTE — PROGRESS NOTES
Subjective   Jewel Rodriguez  is a 35 y.o. female with a pmhx of achalasia status post three-field esophagectomy c/b para-conduit hiatal hernia who presents today for hernia follow up. She is currently 21 week pregnant and doing well. She denied dysphagia or emesis.     There is no significant change in patient's past medical history, past surgical history, social history, family history, or review of systems since last visit.     Objective   Visit Vitals  /51 (BP Location: Left arm, Patient Position: Sitting, BP Cuff Size: Adult)   Pulse 101   Temp 36.5 °C (97.7 °F) (Temporal)   Resp 16     Physical Exam  Vitals reviewed.   Constitutional:       Appearance: Normal appearance.   HENT:      Head: Normocephalic and atraumatic.   Eyes:      General: No scleral icterus.     Extraocular Movements: Extraocular movements intact.      Pupils: Pupils are equal, round, and reactive to light.   Cardiovascular:      Rate and Rhythm: Normal rate and regular rhythm.      Heart sounds: No murmur heard.  Pulmonary:      Effort: Pulmonary effort is normal.      Breath sounds: Normal breath sounds. No wheezing.   Abdominal:      General: Abdomen is flat. There is no distension.      Palpations: Abdomen is soft.      Tenderness: There is no abdominal tenderness. There is no guarding.      Comments: Midline incision c/d/I.    Musculoskeletal:         General: No swelling or tenderness. Normal range of motion.      Cervical back: Normal range of motion and neck supple.   Skin:     General: Skin is warm and dry.      Coloration: Skin is not jaundiced.   Neurological:      General: No focal deficit present.      Mental Status: She is alert and oriented to person, place, and time. Mental status is at baseline.   Psychiatric:         Mood and Affect: Mood normal.         Behavior: Behavior normal.           Diagnostic Review  I reviewed the operative note. It showed para conduit hernia with transverse colon and small bowel in the  left chest with closed-loop obstruction.  Small bowel and colon reduced back into the abdomen without requiring bowel resection.  More than 90 minutes of lysis of adhesions was performed due to prior surgery.  Conduit was healthy in end of the case.   I reviewed the pathology report. 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       Assessment/Plan   Jewel Rodriguez  is doing well. She has no issue with eating and no clinical sign of hernia recurrence. She is currently pregnant. I forgo her CXR today. I recommended follow up in one year with CXR for PEH hernia follow up.     David Bansal MD  Thoracic Surgeon  [facility]   of Medicine  [facility]  Office phone: [phone]  Fax: [phone]  Pager: [phone]    Amount of time spent:  -Prep time on date of patient encounter: 10 min  -Time spend with patient/family/caregiver: 10 min  -Additional time spent on patient care activities: 0 min  -Documentation time in medical record: 10 min  -Other time spent on date of patient encounter: 0 min  Total time: 30 min

## 2025-03-23 ENCOUNTER — HOSPITAL ENCOUNTER (OUTPATIENT)
Facility: HOSPITAL | Age: 35
Discharge: HOME | End: 2025-03-23
Attending: STUDENT IN AN ORGANIZED HEALTH CARE EDUCATION/TRAINING PROGRAM | Admitting: STUDENT IN AN ORGANIZED HEALTH CARE EDUCATION/TRAINING PROGRAM
Payer: MEDICAID

## 2025-03-23 VITALS
SYSTOLIC BLOOD PRESSURE: 109 MMHG | HEART RATE: 80 BPM | RESPIRATION RATE: 17 BRPM | WEIGHT: 149.69 LBS | BODY MASS INDEX: 24.06 KG/M2 | OXYGEN SATURATION: 99 % | DIASTOLIC BLOOD PRESSURE: 59 MMHG | HEIGHT: 66 IN | TEMPERATURE: 97.3 F

## 2025-03-23 DIAGNOSIS — M54.9 BACK PAIN AFFECTING PREGNANCY IN SECOND TRIMESTER: Primary | ICD-10-CM

## 2025-03-23 DIAGNOSIS — K44.9 HIATAL HERNIA: ICD-10-CM

## 2025-03-23 DIAGNOSIS — K22.0 ACHALASIA OF ESOPHAGUS: ICD-10-CM

## 2025-03-23 DIAGNOSIS — O99.891 BACK PAIN AFFECTING PREGNANCY IN SECOND TRIMESTER: Primary | ICD-10-CM

## 2025-03-23 PROCEDURE — 99214 OFFICE O/P EST MOD 30 MIN: CPT

## 2025-03-23 PROCEDURE — 4500999001 HC ED NO CHARGE

## 2025-03-23 PROCEDURE — 2500000001 HC RX 250 WO HCPCS SELF ADMINISTERED DRUGS (ALT 637 FOR MEDICARE OP): Mod: SE

## 2025-03-23 PROCEDURE — 99213 OFFICE O/P EST LOW 20 MIN: CPT

## 2025-03-23 PROCEDURE — 2500000004 HC RX 250 GENERAL PHARMACY W/ HCPCS (ALT 636 FOR OP/ED): Mod: SE

## 2025-03-23 RX ORDER — LABETALOL HYDROCHLORIDE 5 MG/ML
20 INJECTION, SOLUTION INTRAVENOUS ONCE AS NEEDED
Status: DISCONTINUED | OUTPATIENT
Start: 2025-03-23 | End: 2025-03-23 | Stop reason: HOSPADM

## 2025-03-23 RX ORDER — ONDANSETRON HYDROCHLORIDE 2 MG/ML
4 INJECTION, SOLUTION INTRAVENOUS EVERY 6 HOURS PRN
Status: DISCONTINUED | OUTPATIENT
Start: 2025-03-23 | End: 2025-03-23 | Stop reason: HOSPADM

## 2025-03-23 RX ORDER — DOCUSATE SODIUM 100 MG/1
100 CAPSULE, LIQUID FILLED ORAL 2 TIMES DAILY PRN
Qty: 30 CAPSULE | Refills: 5 | Status: SHIPPED | OUTPATIENT
Start: 2025-03-23

## 2025-03-23 RX ORDER — FAMOTIDINE 20 MG/1
20 TABLET, FILM COATED ORAL 2 TIMES DAILY
Qty: 90 TABLET | Refills: 1 | Status: SHIPPED | OUTPATIENT
Start: 2025-03-23

## 2025-03-23 RX ORDER — FAMOTIDINE 20 MG/1
20 TABLET, FILM COATED ORAL ONCE
Status: COMPLETED | OUTPATIENT
Start: 2025-03-23 | End: 2025-03-23

## 2025-03-23 RX ORDER — ONDANSETRON 4 MG/1
4 TABLET, FILM COATED ORAL ONCE
Status: COMPLETED | OUTPATIENT
Start: 2025-03-23 | End: 2025-03-23

## 2025-03-23 RX ORDER — DOCUSATE SODIUM 100 MG/1
100 CAPSULE, LIQUID FILLED ORAL ONCE
Status: COMPLETED | OUTPATIENT
Start: 2025-03-23 | End: 2025-03-23

## 2025-03-23 RX ORDER — NIFEDIPINE 10 MG/1
10 CAPSULE ORAL ONCE AS NEEDED
Status: DISCONTINUED | OUTPATIENT
Start: 2025-03-23 | End: 2025-03-23 | Stop reason: HOSPADM

## 2025-03-23 RX ORDER — HYDRALAZINE HYDROCHLORIDE 20 MG/ML
5 INJECTION INTRAMUSCULAR; INTRAVENOUS ONCE AS NEEDED
Status: DISCONTINUED | OUTPATIENT
Start: 2025-03-23 | End: 2025-03-23 | Stop reason: HOSPADM

## 2025-03-23 RX ORDER — ONDANSETRON 4 MG/1
4 TABLET, FILM COATED ORAL EVERY 6 HOURS PRN
Status: DISCONTINUED | OUTPATIENT
Start: 2025-03-23 | End: 2025-03-23 | Stop reason: HOSPADM

## 2025-03-23 RX ORDER — ACETAMINOPHEN 325 MG/1
975 TABLET ORAL ONCE
Status: COMPLETED | OUTPATIENT
Start: 2025-03-23 | End: 2025-03-23

## 2025-03-23 RX ADMIN — ACETAMINOPHEN 975 MG: 325 TABLET ORAL at 18:59

## 2025-03-23 RX ADMIN — FAMOTIDINE 20 MG: 20 TABLET, FILM COATED ORAL at 18:59

## 2025-03-23 RX ADMIN — ONDANSETRON HYDROCHLORIDE 4 MG: 4 TABLET, FILM COATED ORAL at 18:59

## 2025-03-23 RX ADMIN — DOCUSATE SODIUM 100 MG: 100 CAPSULE, LIQUID FILLED ORAL at 18:59

## 2025-03-23 SDOH — SOCIAL STABILITY: SOCIAL INSECURITY: DOES ANYONE TRY TO KEEP YOU FROM HAVING/CONTACTING OTHER FRIENDS OR DOING THINGS OUTSIDE YOUR HOME?: NO

## 2025-03-23 SDOH — HEALTH STABILITY: MENTAL HEALTH: WERE YOU ABLE TO COMPLETE ALL THE BEHAVIORAL HEALTH SCREENINGS?: YES

## 2025-03-23 SDOH — SOCIAL STABILITY: SOCIAL INSECURITY: PHYSICAL ABUSE: DENIES

## 2025-03-23 SDOH — SOCIAL STABILITY: SOCIAL INSECURITY: HAVE YOU HAD ANY THOUGHTS OF HARMING ANYONE ELSE?: NO

## 2025-03-23 SDOH — SOCIAL STABILITY: SOCIAL INSECURITY: ABUSE SCREEN: ADULT

## 2025-03-23 SDOH — SOCIAL STABILITY: SOCIAL INSECURITY: DO YOU FEEL ANYONE HAS EXPLOITED OR TAKEN ADVANTAGE OF YOU FINANCIALLY OR OF YOUR PERSONAL PROPERTY?: NO

## 2025-03-23 SDOH — SOCIAL STABILITY: SOCIAL INSECURITY: HAVE YOU HAD THOUGHTS OF HARMING ANYONE ELSE?: NO

## 2025-03-23 SDOH — SOCIAL STABILITY: SOCIAL INSECURITY: ARE YOU OR HAVE YOU BEEN THREATENED OR ABUSED PHYSICALLY, EMOTIONALLY, OR SEXUALLY BY ANYONE?: NO

## 2025-03-23 SDOH — HEALTH STABILITY: MENTAL HEALTH: HAVE YOU USED ANY SUBSTANCES (CANABIS, COCAINE, HEROIN, HALLUCINOGENS, INHALANTS, ETC.) IN THE PAST 12 MONTHS?: NO

## 2025-03-23 SDOH — SOCIAL STABILITY: SOCIAL INSECURITY: ARE THERE ANY APPARENT SIGNS OF INJURIES/BEHAVIORS THAT COULD BE RELATED TO ABUSE/NEGLECT?: NO

## 2025-03-23 SDOH — SOCIAL STABILITY: SOCIAL INSECURITY: HAS ANYONE EVER THREATENED TO HURT YOUR FAMILY OR YOUR PETS?: NO

## 2025-03-23 SDOH — HEALTH STABILITY: MENTAL HEALTH: SUICIDAL BEHAVIOR (LIFETIME): NO

## 2025-03-23 SDOH — HEALTH STABILITY: MENTAL HEALTH: NON-SPECIFIC ACTIVE SUICIDAL THOUGHTS (PAST 1 MONTH): NO

## 2025-03-23 SDOH — HEALTH STABILITY: MENTAL HEALTH: HAVE YOU USED ANY PRESCRIPTION DRUGS OTHER THAN PRESCRIBED IN THE PAST 12 MONTHS?: NO

## 2025-03-23 SDOH — HEALTH STABILITY: MENTAL HEALTH: STRENGTHS (MUST CHOOSE TWO): DEMONSTRATES EFFECTIVE COPING SKILLS;SUPPORT FROM FAMILY

## 2025-03-23 SDOH — ECONOMIC STABILITY: HOUSING INSECURITY: DO YOU FEEL UNSAFE GOING BACK TO THE PLACE WHERE YOU ARE LIVING?: NO

## 2025-03-23 SDOH — SOCIAL STABILITY: SOCIAL INSECURITY: VERBAL ABUSE: DENIES

## 2025-03-23 SDOH — HEALTH STABILITY: MENTAL HEALTH: WISH TO BE DEAD (PAST 1 MONTH): NO

## 2025-03-23 ASSESSMENT — LIFESTYLE VARIABLES
AUDIT-C TOTAL SCORE: 0
AUDIT-C TOTAL SCORE: 0
HOW MANY STANDARD DRINKS CONTAINING ALCOHOL DO YOU HAVE ON A TYPICAL DAY: PATIENT DOES NOT DRINK
HOW OFTEN DO YOU HAVE 6 OR MORE DRINKS ON ONE OCCASION: NEVER
SKIP TO QUESTIONS 9-10: 1
HOW OFTEN DO YOU HAVE A DRINK CONTAINING ALCOHOL: NEVER

## 2025-03-23 ASSESSMENT — PATIENT HEALTH QUESTIONNAIRE - PHQ9
2. FEELING DOWN, DEPRESSED OR HOPELESS: NOT AT ALL
SUM OF ALL RESPONSES TO PHQ9 QUESTIONS 1 & 2: 0
1. LITTLE INTEREST OR PLEASURE IN DOING THINGS: NOT AT ALL

## 2025-03-23 ASSESSMENT — COLUMBIA-SUICIDE SEVERITY RATING SCALE - C-SSRS
1. IN THE PAST MONTH, HAVE YOU WISHED YOU WERE DEAD OR WISHED YOU COULD GO TO SLEEP AND NOT WAKE UP?: NO
6. HAVE YOU EVER DONE ANYTHING, STARTED TO DO ANYTHING, OR PREPARED TO DO ANYTHING TO END YOUR LIFE?: NO
2. HAVE YOU ACTUALLY HAD ANY THOUGHTS OF KILLING YOURSELF?: NO

## 2025-03-23 ASSESSMENT — PAIN SCALES - GENERAL: PAINLEVEL_OUTOF10: 7

## 2025-03-23 ASSESSMENT — ACTIVITIES OF DAILY LIVING (ADL): LACK_OF_TRANSPORTATION: NO

## 2025-03-23 NOTE — H&P
25 7:25 PM  OB Triage History & Physical    History of Present Illness  Jewel Rodriguez is a 35 y.o.  at 21w5d  by 7 week ultrasound who presents to OB triage for abdominal pain, and nausea her pregnancy is notable for hx of achalasia s/p repair.    Chief Complaint   Patient presents with    Abdominal Pain        Assessment/Plan    Abdominal Pain, Nausea in s/o Hx of Achalasia s/p repair   -Started a few hours ago, now resolved  -Given colace,zofran, pepcid, and tylenol  -Declines cervical exam  -long term hx of achalasia and hiatal hernia,and acid reflux previously declined maintenance medications  -Discussed symptoms likely associate with meal that upset her stomach, encouraged healthy food options  -Discussed important of bowel regimen, agreeable to colace/pepcid regimen, rx'd on discharge  -Strict return precautions reviewed    Back Pain  -Discussed could be muscular in nature  -Reviewed physiological changes in pregnancy  -Discussed benefits of pelvic floor therapy and maternity support band  -Referral sent on discharge    IUP at 21w5d   +FHT on arrival 150 bpm  -Good fetal movement  -Prenatal labs reviewed, up to date on care   -Risk reducing cell free DNA  -Last Sono: EFW 348g (73%), AC 64%, normal anatomy scan with isolated CPC on 3/10 recommended follow up at 30 weeks  -Continue routine prenatal care  -Next appt     Maternal Well-being  -Emotional support and reassurance provided  -All questions and concerns addressed     Dispo:  -The patient is appropriate for discharge home.   -Maternal/Fetal warning signs reviewed, and return precautions were reviewed.  -Follow up at return OB  or OB triage sooner, PRN.        The above plan was discussed with Dr. Mcmahon , who agreed patient is safe and clinically stable for discharge home.     SHERICE Mendez-CNP, CLC   Obstetrics & Gynecology  25 7:25 PM  Vocera/Haiku    Subjective   Jewel Rodriguez presents to OB triage for 7/10  "abdominal pain that feels sharp/achey,nausea and reports that she vomitted 3 times today. She reports that she has felt like this before and she presented to the ED for evaluation. Today she ate a [place] sub from [employer] and pop-eyes mashed potatoes and shrimp dish. A few hours after eating she felt really sick, sharp abdominal cramps, and nausous, then vomitted 3 times. During our interview the patient stood up and said, \"I think I'm fine now, I feel so much better. I'm ready to go home.\" She follows with the maternal fetal medicine team for her prenatal care for her history of achalasia of the esophagus s/p repair she is not on any maintenance medications, though reports long term history of acid reflux. She also has a hiatal hernia. She reports that her last bowel movement was Thursday or Friday. She denies any chest pain, or shortness of breath. She has started to feel movement this pregnancy, endorses frequent movement, denies contractions, loss of vaginal fluid, or vaginal bleeding, she does not feel as though these are contractions or pre-term labor, declines a cervical exam today.      Prenatal Provider Dr. Villavicencio      Pregnancy notable for:  Pregnancy Problems (from 24 to present)       No problems associated with this episode.             Obstetrical History   OB History    Para Term  AB Living   7 4 4   2 4   SAB IAB Ectopic Multiple Live Births     2     4      # Outcome Date GA Lbr Salazar/2nd Weight Sex Type Anes PTL Lv   7 Current            6 Term 23    F    AMELIA   5 Term 18    F    AMELIA   4 Term 14    M CS-LTranv   AMELIA      Complications: Abruptio Placenta   3 Term 10/26/09    F Vag-Spont   AMELIA   2 IAB            1 IAB                Past Medical History  Past Medical History:   Diagnosis Date    Achalasia     Encounter for screening for malignant neoplasm of vagina     Vaginal Pap smear    Mild hyperemesis gravidarum ([facility])     Hyperemesis gravidarum    " Other conditions influencing health status     Menstruation    Other conditions influencing health status     H/O pregnancy    Other specified noninflammatory disorders of vagina     Vaginal odor    Other specified noninflammatory disorders of vagina     Vaginal irritation    Other specified noninflammatory disorders of vagina     Vaginal itching    Personal history of other diseases of the female genital tract     Personal history of amenorrhea    Personal history of other diseases of the female genital tract     History of vaginal discharge    Personal history of other infectious and parasitic diseases     History of gonorrhea    Personal history of other mental and behavioral disorders     History of depression    Personal history of other specified conditions     History of abnormal Pap smear    Unspecified abdominal pain 2013    Abdominal pain        Past Surgical History   Past Surgical History:   Procedure Laterality Date     SECTION, CLASSIC  04/15/2014     Section    COLPOSCOPY  04/15/2014    Colposcopy    OTHER SURGICAL HISTORY  08/10/2017    Esophagectomy    OTHER SURGICAL HISTORY  2017    Esophageal Surgery Heller Myotomy    OTHER SURGICAL HISTORY  04/15/2014    Upper Gastrointestinal Endoscopy (Therapeutic)       Social History  Social History     Tobacco Use    Smoking status: Never    Smokeless tobacco: Never   Substance Use Topics    Alcohol use: Never     Substance and Sexual Activity   Drug Use Never     Social History     Socioeconomic History    Marital status: Single     Spouse name: Not on file    Number of children: Not on file    Years of education: Not on file    Highest education level: Not on file   Occupational History    Not on file   Tobacco Use    Smoking status: Never    Smokeless tobacco: Never   Vaping Use    Vaping status: Never Used   Substance and Sexual Activity    Alcohol use: Never    Drug use: Never    Sexual activity: Yes     Partners: Male    Other Topics Concern    Not on file   Social History Narrative    Not on file     Social Drivers of Health     Financial Resource Strain: Low Risk  (3/23/2025)    Overall Financial Resource Strain (CARDIA)     Difficulty of Paying Living Expenses: Not hard at all   Food Insecurity: Not on file   Transportation Needs: No Transportation Needs (3/23/2025)    PRAPARE - Transportation     Lack of Transportation (Medical): No     Lack of Transportation (Non-Medical): No   Physical Activity: Not on file   Stress: Not on file   Social Connections: Not on file   Intimate Partner Violence: Not on file        Allergies  Patient has no known allergies.     Medications  Medications Prior to Admission   Medication Sig Dispense Refill Last Dose/Taking    acetaminophen (Tylenol) 325 mg tablet Take 2 tablets (650 mg) by mouth every 6 hours if needed for mild pain (1 - 3).   Past Week    prenatal vitamin, iron-folic, (Prenatal) 27 mg iron-800 mcg folic acid tablet Take 1 tablet by mouth once daily.   Past Week    sertraline (Zoloft) 50 mg tablet Take 1 tablet (50 mg) by mouth once daily. 60 tablet 3 Past Month    ibuprofen 100 mg/5 mL suspension Take 10 mL (200 mg) by mouth every 6 hours if needed for mild pain (1 - 3). 300 mL 0     lidocaine 4 % patch Place 1 patch over 12 hours on the skin once daily. Remove & discard patch within 12 hours or as directed by MD. 15 patch 0     sennosides-docusate sodium (Tiara-Colace) 8.6-50 mg tablet Take 1 tablet by mouth 2 times a day. 30 tablet 0        Objective    Last Vitals  Temp Pulse Resp BP MAP O2 Sat   36.7 °C (98.1 °F) 79 18 111/59   97 %       Physical Exam  Physical Exam  Constitutional:       General: She is not in acute distress.     Appearance: Normal appearance. She is normal weight. She is not ill-appearing or toxic-appearing.   HENT:      Head: Normocephalic and atraumatic.   Eyes:      General: No scleral icterus.     Extraocular Movements: Extraocular movements intact.       Conjunctiva/sclera: Conjunctivae normal.      Pupils: Pupils are equal, round, and reactive to light.   Cardiovascular:      Rate and Rhythm: Normal rate and regular rhythm.      Pulses: Normal pulses.      Heart sounds: Normal heart sounds. No murmur heard.  Abdominal:      General: Abdomen is flat. Bowel sounds are normal. There is no distension.      Palpations: Abdomen is soft.      Tenderness: There is no abdominal tenderness.      Comments: Midline incision, closed, dry intact   Musculoskeletal:         General: Normal range of motion.   Neurological:      General: No focal deficit present.      Mental Status: She is alert and oriented to person, place, and time.   Skin:     General: Skin is warm and dry.      Capillary Refill: Capillary refill takes less than 2 seconds.   Psychiatric:         Mood and Affect: Mood normal.         Behavior: Behavior normal.         Thought Content: Thought content normal.         Judgment: Judgment normal.        Fetal Monitoring  +FHT on doppler        Lab Review  No visits with results within 1 Day(s) from this visit.   Latest known visit with results is:   Lab on 01/13/2025   Component Date Value Ref Range Status    Glucose 01/13/2025 83  74 - 99 mg/dL Final    Sodium 01/13/2025 133 (L)  136 - 145 mmol/L Final    Potassium 01/13/2025 4.2  3.5 - 5.3 mmol/L Final    Chloride 01/13/2025 102  98 - 107 mmol/L Final    Bicarbonate 01/13/2025 17 (L)  21 - 32 mmol/L Final    Anion Gap 01/13/2025 18  10 - 20 mmol/L Final    Urea Nitrogen 01/13/2025 9  6 - 23 mg/dL Final    Creatinine 01/13/2025 0.60  0.50 - 1.05 mg/dL Final    eGFR 01/13/2025 >90  >60 mL/min/1.73m*2 Final    Calculations of estimated GFR are performed using the 2021 CKD-EPI Study Refit equation without the race variable for the IDMS-Traceable creatinine methods.  https://jasn.asnjournals.org/content/early/2021/09/22/ASN.0835353202    Calcium 01/13/2025 9.7  8.6 - 10.6 mg/dL Final    Albumin 01/13/2025 4.5  3.4 - 5.0  g/dL Final    Alkaline Phosphatase 01/13/2025 62  33 - 110 U/L Final    Total Protein 01/13/2025 7.8  6.4 - 8.2 g/dL Final    AST 01/13/2025 16  9 - 39 U/L Final    Bilirubin, Total 01/13/2025 0.3  0.0 - 1.2 mg/dL Final    ALT 01/13/2025 12  7 - 45 U/L Final    Patients treated with Sulfasalazine may generate falsely decreased results for ALT.      Prenatal labs reviewed, not remarkable.

## 2025-03-23 NOTE — ED TRIAGE NOTES
Pt presents to the ED with complaints of back and abdominal pain that's started earlier today. Pt is 21 weeks pregnant and is . Pt states the pain is in her lower back and states it hurts more when she moves positions.

## 2025-04-14 ENCOUNTER — OFFICE VISIT (OUTPATIENT)
Dept: MATERNAL FETAL MEDICINE | Facility: HOSPITAL | Age: 35
End: 2025-04-14
Payer: MEDICAID

## 2025-04-14 VITALS
WEIGHT: 155.2 LBS | OXYGEN SATURATION: 99 % | BODY MASS INDEX: 25.05 KG/M2 | DIASTOLIC BLOOD PRESSURE: 73 MMHG | HEART RATE: 99 BPM | TEMPERATURE: 97.3 F | RESPIRATION RATE: 18 BRPM | SYSTOLIC BLOOD PRESSURE: 114 MMHG

## 2025-04-14 DIAGNOSIS — Z98.890 HISTORY OF ESOPHAGECTOMY: ICD-10-CM

## 2025-04-14 DIAGNOSIS — O98.319: Primary | ICD-10-CM

## 2025-04-14 DIAGNOSIS — K22.0 ACHALASIA OF ESOPHAGUS: ICD-10-CM

## 2025-04-14 DIAGNOSIS — O34.219 HISTORY OF CESAREAN DELIVERY AFFECTING PREGNANCY (HHS-HCC): ICD-10-CM

## 2025-04-14 DIAGNOSIS — Z90.49 HISTORY OF ESOPHAGECTOMY: ICD-10-CM

## 2025-04-14 DIAGNOSIS — Z3A.24 24 WEEKS GESTATION OF PREGNANCY (HHS-HCC): ICD-10-CM

## 2025-04-14 PROCEDURE — 99214 OFFICE O/P EST MOD 30 MIN: CPT | Performed by: STUDENT IN AN ORGANIZED HEALTH CARE EDUCATION/TRAINING PROGRAM

## 2025-04-14 PROCEDURE — 99214 OFFICE O/P EST MOD 30 MIN: CPT | Mod: TH | Performed by: STUDENT IN AN ORGANIZED HEALTH CARE EDUCATION/TRAINING PROGRAM

## 2025-04-14 ASSESSMENT — PAIN SCALES - GENERAL: PAINLEVEL_OUTOF10: 0-NO PAIN

## 2025-04-14 NOTE — PROGRESS NOTES
Jewel Rodriguez is a 33 yo  @ 24w6d who presents for a TIM. Pregnancy complicated by a hx of achalasia s/p thoracoscopic esophagectomy with pylorotomy and feeding jejunostomy and CD x1 with a successful  x2.     Jewel is doing well today. Denies VB or LOF. +FM. No other concerns.     O: /73 (BP Location: Left arm, Patient Position: Sitting)   Pulse 99   Temp 36.3 °C (97.3 °F) (Temporal)   Resp 18   Wt 70.4 kg (155 lb 3.2 oz)   SpO2 99%   BMI 25.05 kg/m²   Gen: NAD  Resp: nonlabored breathing  Cardiac: good peripheral perfusion  Abd: gravid  Psych: appropriate mood and affect  FHTs: 150s     A/P: Jewel Rodriguez is a 33 yo  @ 20w6d who presents for a TIM. Pregnancy complicated by a hx of achalasia s/p thoracoscopic esophagectomy with pylorotomy and feeding jejunostomy and CD x1 with a successful  x2     Hx of Achalasia s/p repair  - long history of achalasia since childhood requiring multiple surgeries. Most recently in 3/2024 she had a para-conduit hiatal hernia repair without mesh, redo laparotomy and lysis of adhesions following admission for nausea and vomiting   - currently asymptomatic. Has intermittent episodes of heartburn but declines medications for GERD at this time  - discussed low-threshold to present to the ED/OB Triage for N/V due to patient's history  - I do not think that Jewel's prior surgeries are a contraindication to pregnancy. Discussed with pt's thoracic surgeon, Dr. Bansal, who is in agreement. She was seen by Dr. Bansal on 3/19/25     Hx of  x2  - three total vaginal deliveries (one prior to her PLTCD)  - MFMU calculator suggests a 96.1% success rate  - Jewel was counseled that a trial of labor carries the risk of a failed attempt requiring a repeat  and uterine rupture. The uterine rupture risk in women with one prior  is approximately 0.7%  - previously reviewed the nature of the maternal and fetal risks associated with uterine rupture  including bladder and bowel injury and fetal asphyxia  - following our discussion Jewel desires to TOLAC  - will maximize hgb prior to delivery     Panic Attacks  - prescribed Zoloft 50 mg daily but is not taking. Encouraged pt to start  - declines a Behavioral Health referral     Prior  Delivery at 36 weeks gestation  - pt declines serial cervical length ultrasounds     PNC   - O+, ab-, RI  - PNLs reviewed and wnl  - Creatinine initially 1.09, however, repeat appropriate at 0.60  - last pap 2024 NILM with negative HPV  - NT wnl  - s/p LR cfDNA  - Zofran PRN for nausea  - anatomy showed an isolated CPC, otherwise normal. Discussed reassuring findings with Jewel     Dispo - RTC in 3 weeks. 3rd trimester labs at that time     Dharmesh Villavicenico MD  Maternal-Fetal Medicine

## 2025-04-15 LAB
C TRACH RRNA SPEC QL NAA+PROBE: NOT DETECTED
N GONORRHOEA RRNA SPEC QL NAA+PROBE: NOT DETECTED
QUEST GC CT AMPLIFIED (ALWAYS MESSAGE): NORMAL
T VAGINALIS RRNA SPEC QL NAA+PROBE: NOT DETECTED

## 2025-04-22 DIAGNOSIS — K22.0 ACHALASIA OF ESOPHAGUS: ICD-10-CM

## 2025-04-22 DIAGNOSIS — K44.9 HIATAL HERNIA: ICD-10-CM

## 2025-05-04 NOTE — PROGRESS NOTES
Jewel Rodriguez is a 35 yo  @ 27w6d who presents for a TIM. Pregnancy complicated by a hx of achalasia s/p thoracoscopic esophagectomy with pylorotomy and feeding jejunostomy and CD x1 with a successful  x2.     Jewel is doing ok today. Denies VB or LOF. +FM.    O: /63   Wt 68.5 kg (151 lb)   BMI 24.37 kg/m²   Gen: NAD  Resp: nonlabored breathing  Cardiac: good peripheral perfusion  Abd: gravid  Psych: appropriate mood and affect  FHTs: 147     A/P: Jewel Rodriguez is a 35 yo  @ 27w6d who presents for a TIM. Pregnancy complicated by a hx of achalasia s/p thoracoscopic esophagectomy with pylorotomy and feeding jejunostomy and CD x1 with a successful  x2     Hx of Achalasia s/p repair  - long history of achalasia since childhood requiring multiple surgeries. Most recently in 3/2024 she had a para-conduit hiatal hernia repair without mesh, redo laparotomy and lysis of adhesions following admission for nausea and vomiting   - currently asymptomatic. Has intermittent episodes of heartburn but declines medications for GERD at this time  - discussed low-threshold to present to the ED/OB Triage for N/V due to patient's history  - I do not think that Jewel's prior surgeries are a contraindication to pregnancy. Discussed with pt's thoracic surgeon, Dr. Bansal, who is in agreement. She was seen by Dr. Bansal on 3/19/25     Hx of  x2  - three total vaginal deliveries (one prior to her PLTCD)  - MFMU calculator suggests a 96.1% success rate  - Jewel was counseled that a trial of labor carries the risk of a failed attempt requiring a repeat  and uterine rupture. The uterine rupture risk in women with one prior  is approximately 0.7%  - previously reviewed the nature of the maternal and fetal risks associated with uterine rupture including bladder and bowel injury and fetal asphyxia  - following our discussion Jewel desires to TOLAC  - will maximize hgb prior to delivery      Panic Attacks  - prescribed Zoloft 50 mg daily but is not taking  - declines a Behavioral Health referral     Prior  Delivery at 36 weeks gestation  - pt declined serial cervical length ultrasounds     PNC   - O+, ab-, RI  - PNLs reviewed and wnl  - 3rd trimester labs ordered today  - last pap 2024 NILM with negative HPV  - NT wnl  - s/p LR cfDNA  - Zofran PRN for nausea  - anatomy showed an isolated CPC, otherwise normal. Discussed reassuring findings with Jewel Dukeo - RTC in 3 weeks     Dharmesh Villavicencio MD  Maternal-Fetal Medicine

## 2025-05-05 ENCOUNTER — OFFICE VISIT (OUTPATIENT)
Dept: MATERNAL FETAL MEDICINE | Facility: HOSPITAL | Age: 35
End: 2025-05-05
Payer: MEDICAID

## 2025-05-05 VITALS — BODY MASS INDEX: 24.37 KG/M2 | DIASTOLIC BLOOD PRESSURE: 63 MMHG | WEIGHT: 151 LBS | SYSTOLIC BLOOD PRESSURE: 104 MMHG

## 2025-05-05 DIAGNOSIS — K22.0 ACHALASIA OF ESOPHAGUS: ICD-10-CM

## 2025-05-05 DIAGNOSIS — O34.219 HISTORY OF CESAREAN DELIVERY AFFECTING PREGNANCY (HHS-HCC): ICD-10-CM

## 2025-05-05 DIAGNOSIS — Z90.49 HISTORY OF ESOPHAGECTOMY: Primary | ICD-10-CM

## 2025-05-05 DIAGNOSIS — Z98.890 HISTORY OF ESOPHAGECTOMY: Primary | ICD-10-CM

## 2025-05-05 DIAGNOSIS — Z3A.27 27 WEEKS GESTATION OF PREGNANCY (HHS-HCC): ICD-10-CM

## 2025-05-05 DIAGNOSIS — O09.90 HIGH RISK PREGNANCY, ANTEPARTUM (HHS-HCC): ICD-10-CM

## 2025-05-05 PROCEDURE — 99214 OFFICE O/P EST MOD 30 MIN: CPT | Mod: TH | Performed by: STUDENT IN AN ORGANIZED HEALTH CARE EDUCATION/TRAINING PROGRAM

## 2025-05-05 PROCEDURE — 99214 OFFICE O/P EST MOD 30 MIN: CPT | Performed by: STUDENT IN AN ORGANIZED HEALTH CARE EDUCATION/TRAINING PROGRAM

## 2025-05-05 ASSESSMENT — EDINBURGH POSTNATAL DEPRESSION SCALE (EPDS)
TOTAL SCORE: 1
I HAVE BEEN SO UNHAPPY THAT I HAVE BEEN CRYING: NO, NEVER
I HAVE BLAMED MYSELF UNNECESSARILY WHEN THINGS WENT WRONG: NO, NEVER
I HAVE FELT SCARED OR PANICKY FOR NO GOOD REASON: NO, NOT AT ALL
I HAVE LOOKED FORWARD WITH ENJOYMENT TO THINGS: AS MUCH AS I EVER DID
THINGS HAVE BEEN GETTING ON TOP OF ME: NO, I HAVE BEEN COPING AS WELL AS EVER
I HAVE BEEN SO UNHAPPY THAT I HAVE HAD DIFFICULTY SLEEPING: NOT AT ALL
I HAVE FELT SAD OR MISERABLE: NO, NOT AT ALL
THE THOUGHT OF HARMING MYSELF HAS OCCURRED TO ME: NEVER
I HAVE BEEN ANXIOUS OR WORRIED FOR NO GOOD REASON: HARDLY EVER
I HAVE BEEN ABLE TO LAUGH AND SEE THE FUNNY SIDE OF THINGS: AS MUCH AS I ALWAYS COULD

## 2025-05-06 ENCOUNTER — DOCUMENTATION (OUTPATIENT)
Dept: OBSTETRICS AND GYNECOLOGY | Facility: HOSPITAL | Age: 35
End: 2025-05-06
Payer: MEDICAID

## 2025-05-06 NOTE — PROGRESS NOTES
RN faxed document to [facility] for pregnancy belt. Document submitted to be scanned to the patient's chart.  GUEVARA Barcenas

## 2025-05-12 ENCOUNTER — LAB (OUTPATIENT)
Dept: LAB | Facility: HOSPITAL | Age: 35
End: 2025-05-12
Payer: MEDICAID

## 2025-05-12 LAB
ERYTHROCYTE [DISTWIDTH] IN BLOOD BY AUTOMATED COUNT: 14.4 % (ref 11.5–14.5)
FERRITIN SERPL-MCNC: 11 NG/ML
FOLATE SERPL-MCNC: 7.6 NG/ML
HCT VFR BLD AUTO: 35.1 % (ref 36–46)
HGB BLD-MCNC: 10.4 G/DL (ref 12–16)
IRON SATN MFR SERPL: NORMAL %
IRON SERPL-MCNC: 34 UG/DL
MCH RBC QN AUTO: 27.4 PG (ref 26–34)
MCHC RBC AUTO-ENTMCNC: 29.6 G/DL (ref 32–36)
MCV RBC AUTO: 93 FL (ref 80–100)
NRBC BLD-RTO: 0 /100 WBCS (ref 0–0)
PLATELET # BLD AUTO: 238 X10*3/UL (ref 150–450)
RBC # BLD AUTO: 3.79 X10*6/UL (ref 4–5.2)
REFLEX ADDED, ANEMIA PANEL: NORMAL
TIBC SERPL-MCNC: NORMAL UG/DL
UIBC SERPL-MCNC: >450 UG/DL
VIT B12 SERPL-MCNC: 166 PG/ML
WBC # BLD AUTO: 8.7 X10*3/UL (ref 4.4–11.3)

## 2025-05-12 PROCEDURE — 82746 ASSAY OF FOLIC ACID SERUM: CPT

## 2025-05-12 PROCEDURE — 85027 COMPLETE CBC AUTOMATED: CPT

## 2025-05-12 PROCEDURE — 83550 IRON BINDING TEST: CPT

## 2025-05-12 PROCEDURE — 82607 VITAMIN B-12: CPT

## 2025-05-12 PROCEDURE — 82728 ASSAY OF FERRITIN: CPT

## 2025-05-13 DIAGNOSIS — O99.019 IRON DEFICIENCY ANEMIA DURING PREGNANCY: Primary | ICD-10-CM

## 2025-05-13 DIAGNOSIS — D50.9 IRON DEFICIENCY ANEMIA DURING PREGNANCY: Primary | ICD-10-CM

## 2025-05-13 RX ORDER — FERROUS SULFATE 325(65) MG
TABLET ORAL
Qty: 60 TABLET | Refills: 3 | Status: SHIPPED | OUTPATIENT
Start: 2025-05-13

## 2025-05-15 LAB
GLUCOSE 1H P 50 G GLC PO SERPL-MCNC: 127 MG/DL
T PALLIDUM AB SER QL IA: NEGATIVE

## 2025-05-20 ENCOUNTER — HOSPITAL ENCOUNTER (OUTPATIENT)
Dept: RADIOLOGY | Facility: HOSPITAL | Age: 35
Discharge: HOME | End: 2025-05-20
Payer: MEDICAID

## 2025-05-20 DIAGNOSIS — Z3A.01 LESS THAN 8 WEEKS GESTATION OF PREGNANCY (HHS-HCC): ICD-10-CM

## 2025-05-20 DIAGNOSIS — O36.90X0 MATERNAL CARE FOR FETAL PROBLEM, UNSPECIFIED, UNSPECIFIED TRIMESTER, NOT APPLICABLE OR UNSPECIFIED (HHS-HCC): ICD-10-CM

## 2025-05-20 DIAGNOSIS — Z03.74 SUSPECTED PROBLEM WITH FETAL GROWTH NOT FOUND: ICD-10-CM

## 2025-05-20 PROCEDURE — 76816 OB US FOLLOW-UP PER FETUS: CPT

## 2025-06-05 ENCOUNTER — HOSPITAL ENCOUNTER (OUTPATIENT)
Facility: HOSPITAL | Age: 35
End: 2025-06-05
Attending: PEDIATRICS | Admitting: PEDIATRICS
Payer: MEDICAID

## 2025-06-05 ENCOUNTER — HOSPITAL ENCOUNTER (OUTPATIENT)
Facility: HOSPITAL | Age: 35
Discharge: HOME | End: 2025-06-05
Attending: PEDIATRICS | Admitting: PEDIATRICS
Payer: MEDICAID

## 2025-06-05 VITALS
RESPIRATION RATE: 16 BRPM | WEIGHT: 152.56 LBS | TEMPERATURE: 98.4 F | DIASTOLIC BLOOD PRESSURE: 58 MMHG | SYSTOLIC BLOOD PRESSURE: 110 MMHG | HEART RATE: 69 BPM | BODY MASS INDEX: 24.52 KG/M2 | OXYGEN SATURATION: 96 % | HEIGHT: 66 IN

## 2025-06-05 PROCEDURE — 99214 OFFICE O/P EST MOD 30 MIN: CPT

## 2025-06-05 PROCEDURE — 4500999001 HC ED NO CHARGE

## 2025-06-05 SDOH — HEALTH STABILITY: MENTAL HEALTH: WISH TO BE DEAD (PAST 1 MONTH): NO

## 2025-06-05 SDOH — SOCIAL STABILITY: SOCIAL INSECURITY: ARE THERE ANY APPARENT SIGNS OF INJURIES/BEHAVIORS THAT COULD BE RELATED TO ABUSE/NEGLECT?: NO

## 2025-06-05 SDOH — HEALTH STABILITY: MENTAL HEALTH: HAVE YOU USED ANY SUBSTANCES (CANABIS, COCAINE, HEROIN, HALLUCINOGENS, INHALANTS, ETC.) IN THE PAST 12 MONTHS?: NO

## 2025-06-05 SDOH — SOCIAL STABILITY: SOCIAL INSECURITY: DOES ANYONE TRY TO KEEP YOU FROM HAVING/CONTACTING OTHER FRIENDS OR DOING THINGS OUTSIDE YOUR HOME?: NO

## 2025-06-05 SDOH — HEALTH STABILITY: MENTAL HEALTH: SUICIDAL BEHAVIOR (LIFETIME): NO

## 2025-06-05 SDOH — ECONOMIC STABILITY: HOUSING INSECURITY: DO YOU FEEL UNSAFE GOING BACK TO THE PLACE WHERE YOU ARE LIVING?: NO

## 2025-06-05 SDOH — SOCIAL STABILITY: SOCIAL INSECURITY: HAS ANYONE EVER THREATENED TO HURT YOUR FAMILY OR YOUR PETS?: NO

## 2025-06-05 SDOH — SOCIAL STABILITY: SOCIAL INSECURITY: VERBAL ABUSE: DENIES

## 2025-06-05 SDOH — SOCIAL STABILITY: SOCIAL INSECURITY: DO YOU FEEL ANYONE HAS EXPLOITED OR TAKEN ADVANTAGE OF YOU FINANCIALLY OR OF YOUR PERSONAL PROPERTY?: NO

## 2025-06-05 SDOH — HEALTH STABILITY: MENTAL HEALTH: NON-SPECIFIC ACTIVE SUICIDAL THOUGHTS (PAST 1 MONTH): NO

## 2025-06-05 SDOH — SOCIAL STABILITY: SOCIAL INSECURITY: HAVE YOU HAD THOUGHTS OF HARMING ANYONE ELSE?: NO

## 2025-06-05 SDOH — SOCIAL STABILITY: SOCIAL INSECURITY: ARE YOU OR HAVE YOU BEEN THREATENED OR ABUSED PHYSICALLY, EMOTIONALLY, OR SEXUALLY BY ANYONE?: NO

## 2025-06-05 SDOH — HEALTH STABILITY: MENTAL HEALTH: WERE YOU ABLE TO COMPLETE ALL THE BEHAVIORAL HEALTH SCREENINGS?: YES

## 2025-06-05 SDOH — SOCIAL STABILITY: SOCIAL INSECURITY: PHYSICAL ABUSE: DENIES

## 2025-06-05 SDOH — SOCIAL STABILITY: SOCIAL INSECURITY: HAVE YOU HAD ANY THOUGHTS OF HARMING ANYONE ELSE?: NO

## 2025-06-05 SDOH — HEALTH STABILITY: MENTAL HEALTH: HAVE YOU USED ANY PRESCRIPTION DRUGS OTHER THAN PRESCRIBED IN THE PAST 12 MONTHS?: NO

## 2025-06-05 SDOH — SOCIAL STABILITY: SOCIAL INSECURITY: ABUSE SCREEN: ADULT

## 2025-06-05 ASSESSMENT — PAIN - FUNCTIONAL ASSESSMENT: PAIN_FUNCTIONAL_ASSESSMENT: 0-10

## 2025-06-05 ASSESSMENT — LIFESTYLE VARIABLES
AUDIT-C TOTAL SCORE: 0
HOW OFTEN DO YOU HAVE A DRINK CONTAINING ALCOHOL: NEVER
HOW MANY STANDARD DRINKS CONTAINING ALCOHOL DO YOU HAVE ON A TYPICAL DAY: PATIENT DOES NOT DRINK
SKIP TO QUESTIONS 9-10: 1
HOW OFTEN DO YOU HAVE 6 OR MORE DRINKS ON ONE OCCASION: NEVER
AUDIT-C TOTAL SCORE: 0

## 2025-06-05 ASSESSMENT — COLUMBIA-SUICIDE SEVERITY RATING SCALE - C-SSRS
1. IN THE PAST MONTH, HAVE YOU WISHED YOU WERE DEAD OR WISHED YOU COULD GO TO SLEEP AND NOT WAKE UP?: NO
2. HAVE YOU ACTUALLY HAD ANY THOUGHTS OF KILLING YOURSELF?: NO
6. HAVE YOU EVER DONE ANYTHING, STARTED TO DO ANYTHING, OR PREPARED TO DO ANYTHING TO END YOUR LIFE?: NO

## 2025-06-05 ASSESSMENT — PAIN DESCRIPTION - DESCRIPTORS: DESCRIPTORS: CRAMPING

## 2025-06-05 ASSESSMENT — PAIN DESCRIPTION - LOCATION: LOCATION: ABDOMEN

## 2025-06-05 ASSESSMENT — PAIN SCALES - GENERAL
PAINLEVEL_OUTOF10: 7
PAINLEVEL_OUTOF10: 8

## 2025-06-05 ASSESSMENT — PAIN DESCRIPTION - PAIN TYPE: TYPE: ACUTE PAIN

## 2025-06-05 NOTE — H&P
OB Triage H&P    Assessment/Plan    Jewel Rodriguez is a 35 y.o.  at 32w2d, MARTY: 2025, by 7wk Ultrasound, who presents to triage with bleeding and abdominal pain.    Post-coital bleeding  -SSE reassuring, no VB noted from the cervical os, vaginal mucosa intact  - Labs deferred given overall clinical stability    Fetal well-being  -Good fetal movement  -FHT with one subtle late deceleration at 0549 with overall return to normal baseline and moderate variability. In the context of VB as above, recommended 2 hour observation prolonged monitoring with labs --CBC, Coags, and Fibrinogen in an abundance of caution. Patient declined, risks associated including fetal and maternal morbidity and mortality discussed. Patient accepts risks. Would like   -Up to date on prenatal care  -Continue routine prenatal care    Pregnancy notables  -UTD on prenatal care, follows w MFM  -hx of achalasia s/p thoracoscopic esophagectomy with pylorotomy and feeding jejunostomy. Currently asymptomatic  -Prior  x2    Dispo  -Patient appropriate for discharge home, agrees with plan  -Return precautions discussed   -Follow up at next scheduled OB appointment or to triage sooner as needed    Discussed plan and reviewed with: Dr. Flex Simon MS4    Patient seen and evaluated with medical student. Agree with assessment above, edits made within text.     D/w Dr. Demond Mccord MD PGY-4    Pregnancy Problems (from 24 to present)       Problem Noted Diagnosed Resolved    Achalasia of esophagus 3/23/2025 by HOLLY Mckinney  No    Priority:  Medium       Overview Signed 3/23/2025  7:26 PM by HOLLY Mckinney   - long history of achalasia since childhood requiring multiple surgeries. Most recently in 3/2024 she had a para-conduit hiatal hernia repair without mesh, redo laparotomy and lysis of adhesions following admission for nausea and vomiting   - currently asymptomatic. Has  intermittent episodes of heartburn but declines medications for GERD at this time  - discussed low-threshold to present to the ED/OB Triage for N/V due to patient's history                 Subjective   Jewel Rodriguez is a 35 y.o.  at 32w2d, MARTY: 2025, by 7wk Ultrasound, who presents to triage with bleeding and abdominal pain. Pt endorsed a small amount of post-coital bleeding this AM with mild lower abdominal pain. She noticed some blood on the sheets and then after wiping when she went to the bathroom.  Stated that bleeding has stopped since then, no blood noted after recent trip to the bathroom. Pt denies headache, vision changes, SOB, CP, RUQ pain, LOF, bloody show or bleeding concerns. Denies any current symptoms of anxiety or depression.  Good fetal movement.  Having contractions q 3-4 minutes    Pt declined observation for isolated variable decels on FHT, will f/u outpt.    Prenatal Provider Dr. Villavicencio [facility]    OB History    Para Term  AB Living   7 4 4 0 2 4   SAB IAB Ectopic Multiple Live Births   0 2 0 0 4      # Outcome Date GA Lbr Salazar/2nd Weight Sex Type Anes PTL Lv   7 Current            6 Term 23    F    AMELIA   5 Term 18    F    AMELIA   4 Term 14    M CS-LTranv   AMELIA      Complications: Abruptio Placenta   3 Term 10/26/09    F Vag-Spont   AMELIA   2 IAB            1 IAB                Surgical History[1]    Social History     Tobacco Use    Smoking status: Never    Smokeless tobacco: Never   Substance Use Topics    Alcohol use: Never       Allergies[2]    Prescriptions Prior to Admission[3]  Objective     Last Vitals  Temp Pulse Resp BP MAP O2 Sat   36.5 °C (97.7 °F) 75 18 117/69 87 98 %     Blood Pressures         2025  0510 2025  0535          BP: 116/78 117/69               Physical Exam  General: NAD, mood appropriate  Cardiopulmonary: warm and well perfused, breathing comfortably on room air  Abdomen: Gravid, non-tender  Extremities:  Symmetric  Speculum Exam: no pooling of fluid seen. No gross bleeding or bleeding from cervical os noted, vaginal mucosa intact  Cervix: closed    Chaperone Present: Yes.  Chaperone Name/Title: Ita  Examination Chaperoned: Gynecological Exam     Fetal Monitoring  Baseline: 122 bpm, Variability: moderate,  Accelerations: absent and Decelerations: yes; isolated variable  Uterine Activity: Contractions present  Interpretation: Reactive, Cat II    Bedside ultrasound: No    Labs in chart were reviewed.  Hb 10.4         Prenatal labs declined at this time.             [1]   Past Surgical History:  Procedure Laterality Date     SECTION, CLASSIC  04/15/2014     Section    COLPOSCOPY  04/15/2014    Colposcopy    OTHER SURGICAL HISTORY  08/10/2017    Esophagectomy    OTHER SURGICAL HISTORY  2017    Esophageal Surgery Heller Myotomy    OTHER SURGICAL HISTORY  04/15/2014    Upper Gastrointestinal Endoscopy (Therapeutic)   [2] No Known Allergies  [3]   Medications Prior to Admission   Medication Sig Dispense Refill Last Dose/Taking    prenatal vitamin, iron-folic, (Prenatal) 27 mg iron-800 mcg folic acid tablet Take 1 tablet by mouth once daily.   2025    acetaminophen (Tylenol) 325 mg tablet Take 2 tablets (650 mg) by mouth every 6 hours if needed for mild pain (1 - 3).       docusate sodium (Colace) 100 mg capsule Take 1 capsule (100 mg) by mouth 2 times a day as needed for constipation. 30 capsule 5     famotidine (Pepcid) 20 mg tablet Take 1 tablet (20 mg) by mouth 2 times a day. 90 tablet 1     ferrous sulfate (FeosoL) 325 mg (65 mg elemental) tablet Take 1 tablet every other day on an empty stomach, preferably with orange juice or something acidic to improve absorption. 60 tablet 3     ibuprofen 100 mg/5 mL suspension Take 10 mL (200 mg) by mouth every 6 hours if needed for mild pain (1 - 3). 300 mL 0     lidocaine 4 % patch Place 1 patch over 12 hours on the skin once daily. Remove &  discard patch within 12 hours or as directed by MD. 15 patch 0     sennosides-docusate sodium (Tiara-Colace) 8.6-50 mg tablet Take 1 tablet by mouth 2 times a day. 30 tablet 0     sertraline (Zoloft) 50 mg tablet Take 1 tablet (50 mg) by mouth once daily. 60 tablet 3

## 2025-06-05 NOTE — ED TRIAGE NOTES
Pt presents to the ED d/t vaginal bleeding and abdominal pain. Pt is currently 32 weeks pregnant. Pt denies any issues with this pregnancy. Pt states this is her 7th pregnancy.

## 2025-06-15 PROBLEM — O09.899 HISTORY OF PRETERM DELIVERY, CURRENTLY PREGNANT (HHS-HCC): Status: ACTIVE | Noted: 2025-06-15

## 2025-06-15 PROBLEM — F41.0 PANIC ATTACKS: Status: ACTIVE | Noted: 2025-06-15

## 2025-06-15 PROBLEM — O34.219: Status: ACTIVE | Noted: 2025-06-15

## 2025-06-16 ENCOUNTER — OFFICE VISIT (OUTPATIENT)
Dept: MATERNAL FETAL MEDICINE | Facility: HOSPITAL | Age: 35
End: 2025-06-16
Payer: MEDICAID

## 2025-06-16 VITALS — WEIGHT: 150 LBS | SYSTOLIC BLOOD PRESSURE: 108 MMHG | DIASTOLIC BLOOD PRESSURE: 73 MMHG | BODY MASS INDEX: 24.21 KG/M2

## 2025-06-16 DIAGNOSIS — Z3A.33 33 WEEKS GESTATION OF PREGNANCY (HHS-HCC): Primary | ICD-10-CM

## 2025-06-16 DIAGNOSIS — O09.899 HISTORY OF PRETERM DELIVERY, CURRENTLY PREGNANT (HHS-HCC): ICD-10-CM

## 2025-06-16 DIAGNOSIS — O34.219: ICD-10-CM

## 2025-06-16 DIAGNOSIS — K22.0 ACHALASIA OF ESOPHAGUS: ICD-10-CM

## 2025-06-16 PROCEDURE — 99214 OFFICE O/P EST MOD 30 MIN: CPT | Performed by: STUDENT IN AN ORGANIZED HEALTH CARE EDUCATION/TRAINING PROGRAM

## 2025-06-16 PROCEDURE — 99214 OFFICE O/P EST MOD 30 MIN: CPT | Mod: TH | Performed by: STUDENT IN AN ORGANIZED HEALTH CARE EDUCATION/TRAINING PROGRAM

## 2025-06-16 NOTE — PROGRESS NOTES
Jewel Rodriguez is a 33 yo  @ 33w6d who presents for a TIM. Pregnancy complicated by a hx of achalasia s/p thoracoscopic esophagectomy with pylorotomy and feeding jejunostomy and CD x1 with a successful  x2.     Jewel is doing ok today. Denies VB or LOF. +FM.     O: /73   Wt 68 kg (150 lb)   BMI 24.21 kg/m²   Gen: NAD  Resp: nonlabored breathing  Cardiac: good peripheral perfusion  Abd: gravid  Psych: appropriate mood and affect  FHTs: 130     A/P: Jewel Rodriguez is a 33 yo  @ 33w6d who presents for a TIM. Pregnancy complicated by a hx of achalasia s/p thoracoscopic esophagectomy with pylorotomy and feeding jejunostomy and CD x1 with a successful  x2     Hx of Achalasia s/p repair  - long history of achalasia since childhood requiring multiple surgeries. Most recently in 3/2024 she had a para-conduit hiatal hernia repair without mesh, redo laparotomy and lysis of adhesions following admission for nausea and vomiting   - currently asymptomatic. Has intermittent episodes of heartburn but declines medications for GERD at this time  - discussed low-threshold to present to the ED/OB Triage for N/V due to patient's history  - I do not think that Jewel's prior surgeries are a contraindication to pregnancy. Discussed with pt's thoracic surgeon, Dr. Bansal, who is in agreement. She was seen by Dr. Bansal on 3/19/25     Hx of  x2  - three total vaginal deliveries (one prior to her PLTCD)  - MFMU calculator suggests a 96.1% success rate  - Jewel was counseled that a trial of labor carries the risk of a failed attempt requiring a repeat  and uterine rupture. The uterine rupture risk in women with one prior  is approximately 0.7%  - previously reviewed the nature of the maternal and fetal risks associated with uterine rupture including bladder and bowel injury and fetal asphyxia  - following our discussion Jewel desires to TOLAC  - last hgb 10.4 on . Has not started  iron. Encouraged pt to start today. Will repeat at next visit     Panic Attacks  - prescribed Zoloft 50 mg daily but is not taking  - declines a Behavioral Health referral     Prior  Delivery at 36 weeks gestation  - pt declined serial cervical length ultrasounds     PNC   - O+, ab-, RI  - PNLs reviewed and wnl  - 1 hr   - last pap 2024 NILM with negative HPV  - NT wnl  - s/p LR cfDNA  - Zofran PRN for nausea  - anatomy showed an isolated CPC, otherwise normal. Discussed reassuring findings with Chelice  - last growth U/S @ 30w0d showed an EFW of 1515 g (41%)     Dispo - RTC in 2 weeks with a growth ultrasound     Dharmesh Villavicencio MD  Maternal-Fetal Medicine

## 2025-06-30 ENCOUNTER — HOSPITAL ENCOUNTER (OUTPATIENT)
Dept: RADIOLOGY | Facility: HOSPITAL | Age: 35
Discharge: HOME | End: 2025-06-30
Payer: MEDICAID

## 2025-06-30 ENCOUNTER — OFFICE VISIT (OUTPATIENT)
Dept: MATERNAL FETAL MEDICINE | Facility: HOSPITAL | Age: 35
End: 2025-06-30
Payer: MEDICAID

## 2025-06-30 ENCOUNTER — LAB (OUTPATIENT)
Dept: LAB | Facility: HOSPITAL | Age: 35
End: 2025-06-30
Payer: MEDICAID

## 2025-06-30 VITALS — WEIGHT: 151 LBS | DIASTOLIC BLOOD PRESSURE: 67 MMHG | SYSTOLIC BLOOD PRESSURE: 111 MMHG | BODY MASS INDEX: 24.37 KG/M2

## 2025-06-30 DIAGNOSIS — O34.219 MATERNAL CARE FOR UNSPECIFIED TYPE SCAR FROM PREVIOUS CESAREAN DELIVERY (HHS-HCC): ICD-10-CM

## 2025-06-30 DIAGNOSIS — O09.899 SUPERVISION OF OTHER HIGH RISK PREGNANCIES, UNSPECIFIED TRIMESTER (HHS-HCC): ICD-10-CM

## 2025-06-30 DIAGNOSIS — Z3A.35 35 WEEKS GESTATION OF PREGNANCY (HHS-HCC): Primary | ICD-10-CM

## 2025-06-30 DIAGNOSIS — Z3A.01 LESS THAN 8 WEEKS GESTATION OF PREGNANCY (HHS-HCC): ICD-10-CM

## 2025-06-30 DIAGNOSIS — K22.0 ACHALASIA OF CARDIA: ICD-10-CM

## 2025-06-30 DIAGNOSIS — K22.0 ACHALASIA OF ESOPHAGUS: ICD-10-CM

## 2025-06-30 DIAGNOSIS — O09.899 HISTORY OF PRETERM DELIVERY, CURRENTLY PREGNANT (HHS-HCC): ICD-10-CM

## 2025-06-30 DIAGNOSIS — O34.219: ICD-10-CM

## 2025-06-30 LAB
ERYTHROCYTE [DISTWIDTH] IN BLOOD BY AUTOMATED COUNT: 16 % (ref 11.5–14.5)
HCT VFR BLD AUTO: 37.1 % (ref 36–46)
HGB BLD-MCNC: 11.4 G/DL (ref 12–16)
MCH RBC QN AUTO: 26.9 PG (ref 26–34)
MCHC RBC AUTO-ENTMCNC: 30.7 G/DL (ref 32–36)
MCV RBC AUTO: 88 FL (ref 80–100)
NRBC BLD-RTO: 0 /100 WBCS (ref 0–0)
PLATELET # BLD AUTO: 213 X10*3/UL (ref 150–450)
RBC # BLD AUTO: 4.24 X10*6/UL (ref 4–5.2)
REFLEX ADDED, ANEMIA PANEL: NORMAL
WBC # BLD AUTO: 8.3 X10*3/UL (ref 4.4–11.3)

## 2025-06-30 PROCEDURE — 99214 OFFICE O/P EST MOD 30 MIN: CPT | Mod: TH | Performed by: STUDENT IN AN ORGANIZED HEALTH CARE EDUCATION/TRAINING PROGRAM

## 2025-06-30 PROCEDURE — 76816 OB US FOLLOW-UP PER FETUS: CPT

## 2025-06-30 PROCEDURE — 85027 COMPLETE CBC AUTOMATED: CPT

## 2025-06-30 PROCEDURE — 36415 COLL VENOUS BLD VENIPUNCTURE: CPT

## 2025-06-30 PROCEDURE — 99214 OFFICE O/P EST MOD 30 MIN: CPT | Performed by: STUDENT IN AN ORGANIZED HEALTH CARE EDUCATION/TRAINING PROGRAM

## 2025-06-30 PROCEDURE — 76816 OB US FOLLOW-UP PER FETUS: CPT | Performed by: STUDENT IN AN ORGANIZED HEALTH CARE EDUCATION/TRAINING PROGRAM

## 2025-06-30 NOTE — PROGRESS NOTES
Jewel Rodriguez is a 35 yo  @ 35w6d who presents for a TIM. Pregnancy complicated by a hx of achalasia s/p thoracoscopic esophagectomy with pylorotomy and feeding jejunostomy and CD x1 with a successful  x2.     Jewel is doing ok today. Denies VB or LOF. +FM.     O: /67   Wt 68.5 kg (151 lb)   BMI 24.37 kg/m²   Gen: NAD  Resp: nonlabored breathing  Cardiac: good peripheral perfusion  Abd: gravid  Psych: appropriate mood and affect  FHTs: +FCA on U/S     A/P: Jewel Rodriguez is a 35 yo  @ 35w6d who presents for a TIM. Pregnancy complicated by a hx of achalasia s/p thoracoscopic esophagectomy with pylorotomy and feeding jejunostomy and CD x1 with a successful  x2     Hx of Achalasia s/p repair  - long history of achalasia since childhood requiring multiple surgeries. Most recently in 3/2024 she had a para-conduit hiatal hernia repair without mesh, redo laparotomy and lysis of adhesions following admission for nausea and vomiting   - currently asymptomatic. Has intermittent episodes of heartburn but declines medications for GERD at this time  - discussed low-threshold to present to the ED/OB Triage for N/V due to patient's history  - I do not think that Jewel's prior surgeries are a contraindication to pregnancy. Discussed with 's thoracic surgeon, Dr. Bansal, who is in agreement. She was seen by Dr. Bansal on 3/19/25     Hx of  x2  - three total vaginal deliveries (one prior to her PLTCD)  - MFMU calculator suggests a 96.1% success rate  - Jewel was counseled that a trial of labor carries the risk of a failed attempt requiring a repeat  and uterine rupture. The uterine rupture risk in women with one prior  is approximately 0.7%  - previously reviewed the nature of the maternal and fetal risks associated with uterine rupture including bladder and bowel injury and fetal asphyxia  - following our discussion Jewel desires to TOLAC  - last hgb 10.4 on . Has not  started iron. Will repeat a CBC today     Panic Attacks  - prescribed Zoloft 50 mg daily but is not taking  - declines a Behavioral Health referral     Prior  Delivery at 36 weeks gestation  - pt declined serial cervical length ultrasounds     PNC   - O+, ab-, RI  - PNLs reviewed and wnl  - 1 hr   - last pap 2024 NILM with negative HPV  - NT wnl  - s/p LR cfDNA  - Zofran PRN for nausea  - anatomy showed an isolated CPC, otherwise normal. Discussed reassuring findings with Chelice  - growth today showed an EFW of 3122 g (82%)  - GBS today     Dispo - RTC in 2 weeks     Dharmesh Villavicencio MD  Maternal-Fetal Medicine

## 2025-07-01 ENCOUNTER — PATIENT MESSAGE (OUTPATIENT)
Dept: OBSTETRICS AND GYNECOLOGY | Facility: HOSPITAL | Age: 35
End: 2025-07-01
Payer: MEDICAID

## 2025-07-03 ENCOUNTER — PATIENT MESSAGE (OUTPATIENT)
Dept: MATERNAL FETAL MEDICINE | Facility: HOSPITAL | Age: 35
End: 2025-07-03
Payer: MEDICAID

## 2025-07-03 LAB — GP B STREP SPEC QL CULT: NORMAL

## 2025-07-10 ENCOUNTER — HOSPITAL ENCOUNTER (INPATIENT)
Facility: HOSPITAL | Age: 35
LOS: 2 days | Discharge: HOME | End: 2025-07-12
Attending: OBSTETRICS & GYNECOLOGY | Admitting: OBSTETRICS & GYNECOLOGY
Payer: MEDICAID

## 2025-07-10 ENCOUNTER — ANESTHESIA (OUTPATIENT)
Dept: OBSTETRICS AND GYNECOLOGY | Facility: HOSPITAL | Age: 35
End: 2025-07-10
Payer: MEDICAID

## 2025-07-10 ENCOUNTER — ANESTHESIA EVENT (OUTPATIENT)
Dept: OBSTETRICS AND GYNECOLOGY | Facility: HOSPITAL | Age: 35
End: 2025-07-10
Payer: MEDICAID

## 2025-07-10 DIAGNOSIS — F41.9 ANXIETY: ICD-10-CM

## 2025-07-10 DIAGNOSIS — S37.63XA CERVICAL LACERATION, INITIAL ENCOUNTER: ICD-10-CM

## 2025-07-10 DIAGNOSIS — F41.9 ANXIETY IN PREGNANCY IN FIRST TRIMESTER, ANTEPARTUM: ICD-10-CM

## 2025-07-10 DIAGNOSIS — O99.341 ANXIETY IN PREGNANCY IN FIRST TRIMESTER, ANTEPARTUM: ICD-10-CM

## 2025-07-10 DIAGNOSIS — Z37.9 NORMAL LABOR (HHS-HCC): Primary | ICD-10-CM

## 2025-07-10 PROBLEM — Z98.890 HISTORY OF ESOPHAGECTOMY: Status: ACTIVE | Noted: 2025-07-10

## 2025-07-10 PROBLEM — Z90.49 HISTORY OF ESOPHAGECTOMY: Status: ACTIVE | Noted: 2025-07-10

## 2025-07-10 LAB
ABO GROUP (TYPE) IN BLOOD: NORMAL
ANTIBODY SCREEN: NORMAL
APTT PPP: 24 SECONDS (ref 26–36)
ERYTHROCYTE [DISTWIDTH] IN BLOOD BY AUTOMATED COUNT: 16.2 % (ref 11.5–14.5)
FIBRINOGEN PPP-MCNC: 355 MG/DL (ref 200–400)
HCT VFR BLD AUTO: 34.1 % (ref 36–46)
HGB BLD-MCNC: 10.6 G/DL (ref 12–16)
INR PPP: 1 (ref 0.9–1.1)
MCH RBC QN AUTO: 26.4 PG (ref 26–34)
MCHC RBC AUTO-ENTMCNC: 31.1 G/DL (ref 32–36)
MCV RBC AUTO: 85 FL (ref 80–100)
NRBC BLD-RTO: 0 /100 WBCS (ref 0–0)
PLATELET # BLD AUTO: 218 X10*3/UL (ref 150–450)
PROTHROMBIN TIME: 11 SECONDS (ref 9.8–12.4)
RBC # BLD AUTO: 4.01 X10*6/UL (ref 4–5.2)
RH FACTOR (ANTIGEN D): NORMAL
TREPONEMA PALLIDUM IGG+IGM AB [PRESENCE] IN SERUM OR PLASMA BY IMMUNOASSAY: NONREACTIVE
WBC # BLD AUTO: 12.8 X10*3/UL (ref 4.4–11.3)

## 2025-07-10 PROCEDURE — 57720 REVISION OF CERVIX: CPT | Mod: 78,GC | Performed by: STUDENT IN AN ORGANIZED HEALTH CARE EDUCATION/TRAINING PROGRAM

## 2025-07-10 PROCEDURE — 59409 OBSTETRICAL CARE: CPT | Performed by: STUDENT IN AN ORGANIZED HEALTH CARE EDUCATION/TRAINING PROGRAM

## 2025-07-10 PROCEDURE — 86780 TREPONEMA PALLIDUM: CPT

## 2025-07-10 PROCEDURE — 2500000004 HC RX 250 GENERAL PHARMACY W/ HCPCS (ALT 636 FOR OP/ED): Mod: JZ,SE

## 2025-07-10 PROCEDURE — 10907ZC DRAINAGE OF AMNIOTIC FLUID, THERAPEUTIC FROM PRODUCTS OF CONCEPTION, VIA NATURAL OR ARTIFICIAL OPENING: ICD-10-PCS | Performed by: STUDENT IN AN ORGANIZED HEALTH CARE EDUCATION/TRAINING PROGRAM

## 2025-07-10 PROCEDURE — 0UQC7ZZ REPAIR CERVIX, VIA NATURAL OR ARTIFICIAL OPENING: ICD-10-PCS | Performed by: STUDENT IN AN ORGANIZED HEALTH CARE EDUCATION/TRAINING PROGRAM

## 2025-07-10 PROCEDURE — 3700000014 HC AN EPIDURAL BLOCK CHARGE: Performed by: STUDENT IN AN ORGANIZED HEALTH CARE EDUCATION/TRAINING PROGRAM

## 2025-07-10 PROCEDURE — 85384 FIBRINOGEN ACTIVITY: CPT

## 2025-07-10 PROCEDURE — 85027 COMPLETE CBC AUTOMATED: CPT

## 2025-07-10 PROCEDURE — 2500000001 HC RX 250 WO HCPCS SELF ADMINISTERED DRUGS (ALT 637 FOR MEDICARE OP): Mod: SE

## 2025-07-10 PROCEDURE — 36415 COLL VENOUS BLD VENIPUNCTURE: CPT

## 2025-07-10 PROCEDURE — 99284 EMERGENCY DEPT VISIT MOD MDM: CPT

## 2025-07-10 PROCEDURE — 4500999001 HC ED NO CHARGE

## 2025-07-10 PROCEDURE — 85610 PROTHROMBIN TIME: CPT

## 2025-07-10 PROCEDURE — 7100000016 HC LABOR RECOVERY PER HOUR: Performed by: STUDENT IN AN ORGANIZED HEALTH CARE EDUCATION/TRAINING PROGRAM

## 2025-07-10 PROCEDURE — 57720 REVISION OF CERVIX: CPT | Performed by: STUDENT IN AN ORGANIZED HEALTH CARE EDUCATION/TRAINING PROGRAM

## 2025-07-10 PROCEDURE — 1100000001 HC PRIVATE ROOM DAILY

## 2025-07-10 PROCEDURE — 2500000004 HC RX 250 GENERAL PHARMACY W/ HCPCS (ALT 636 FOR OP/ED): Mod: SE

## 2025-07-10 PROCEDURE — 86901 BLOOD TYPING SEROLOGIC RH(D): CPT

## 2025-07-10 PROCEDURE — 86923 COMPATIBILITY TEST ELECTRIC: CPT

## 2025-07-10 PROCEDURE — 88307 TISSUE EXAM BY PATHOLOGIST: CPT | Mod: TC,SUR | Performed by: STUDENT IN AN ORGANIZED HEALTH CARE EDUCATION/TRAINING PROGRAM

## 2025-07-10 RX ORDER — HYDRALAZINE HYDROCHLORIDE 20 MG/ML
5 INJECTION INTRAMUSCULAR; INTRAVENOUS ONCE AS NEEDED
Status: DISCONTINUED | OUTPATIENT
Start: 2025-07-10 | End: 2025-07-10

## 2025-07-10 RX ORDER — TRANEXAMIC ACID 1 G/10ML
1000 INJECTION, SOLUTION INTRAVENOUS ONCE AS NEEDED
Status: DISCONTINUED | OUTPATIENT
Start: 2025-07-10 | End: 2025-07-10

## 2025-07-10 RX ORDER — ONDANSETRON 4 MG/1
4 TABLET, FILM COATED ORAL EVERY 6 HOURS PRN
Status: DISCONTINUED | OUTPATIENT
Start: 2025-07-10 | End: 2025-07-12 | Stop reason: HOSPADM

## 2025-07-10 RX ORDER — TERBUTALINE SULFATE 1 MG/ML
0.25 INJECTION SUBCUTANEOUS ONCE AS NEEDED
Status: DISCONTINUED | OUTPATIENT
Start: 2025-07-10 | End: 2025-07-10

## 2025-07-10 RX ORDER — OXYTOCIN 10 [USP'U]/ML
INJECTION, SOLUTION INTRAMUSCULAR; INTRAVENOUS
Status: DISPENSED
Start: 2025-07-10 | End: 2025-07-11

## 2025-07-10 RX ORDER — DIPHENHYDRAMINE HYDROCHLORIDE 50 MG/ML
25 INJECTION, SOLUTION INTRAMUSCULAR; INTRAVENOUS EVERY 6 HOURS PRN
Status: DISCONTINUED | OUTPATIENT
Start: 2025-07-10 | End: 2025-07-12 | Stop reason: HOSPADM

## 2025-07-10 RX ORDER — MISOPROSTOL 200 UG/1
800 TABLET ORAL ONCE AS NEEDED
Status: DISCONTINUED | OUTPATIENT
Start: 2025-07-10 | End: 2025-07-10

## 2025-07-10 RX ORDER — LABETALOL HYDROCHLORIDE 5 MG/ML
20 INJECTION, SOLUTION INTRAVENOUS ONCE AS NEEDED
Status: DISCONTINUED | OUTPATIENT
Start: 2025-07-10 | End: 2025-07-12 | Stop reason: HOSPADM

## 2025-07-10 RX ORDER — ACETAMINOPHEN 120 MG/1
SUPPOSITORY RECTAL AS NEEDED
Status: DISCONTINUED | OUTPATIENT
Start: 2025-07-10 | End: 2025-07-10

## 2025-07-10 RX ORDER — METHYLERGONOVINE MALEATE 0.2 MG/ML
0.2 INJECTION INTRAVENOUS ONCE AS NEEDED
Status: DISCONTINUED | OUTPATIENT
Start: 2025-07-10 | End: 2025-07-10

## 2025-07-10 RX ORDER — CARBOPROST TROMETHAMINE 250 UG/ML
250 INJECTION, SOLUTION INTRAMUSCULAR ONCE AS NEEDED
Status: DISCONTINUED | OUTPATIENT
Start: 2025-07-10 | End: 2025-07-12 | Stop reason: HOSPADM

## 2025-07-10 RX ORDER — IBUPROFEN 600 MG/1
600 TABLET, FILM COATED ORAL EVERY 6 HOURS
Status: DISCONTINUED | OUTPATIENT
Start: 2025-07-10 | End: 2025-07-12 | Stop reason: HOSPADM

## 2025-07-10 RX ORDER — ACETAMINOPHEN 325 MG/1
975 TABLET ORAL EVERY 6 HOURS
Status: DISCONTINUED | OUTPATIENT
Start: 2025-07-10 | End: 2025-07-12 | Stop reason: HOSPADM

## 2025-07-10 RX ORDER — LOPERAMIDE HYDROCHLORIDE 2 MG/1
4 CAPSULE ORAL EVERY 2 HOUR PRN
Status: DISCONTINUED | OUTPATIENT
Start: 2025-07-10 | End: 2025-07-12 | Stop reason: HOSPADM

## 2025-07-10 RX ORDER — CARBOPROST TROMETHAMINE 250 UG/ML
250 INJECTION, SOLUTION INTRAMUSCULAR ONCE AS NEEDED
Status: DISCONTINUED | OUTPATIENT
Start: 2025-07-10 | End: 2025-07-10

## 2025-07-10 RX ORDER — ADHESIVE BANDAGE
10 BANDAGE TOPICAL
Status: DISCONTINUED | OUTPATIENT
Start: 2025-07-10 | End: 2025-07-12 | Stop reason: HOSPADM

## 2025-07-10 RX ORDER — SIMETHICONE 80 MG
80 TABLET,CHEWABLE ORAL 4 TIMES DAILY PRN
Status: DISCONTINUED | OUTPATIENT
Start: 2025-07-10 | End: 2025-07-12 | Stop reason: HOSPADM

## 2025-07-10 RX ORDER — OXYTOCIN 10 [USP'U]/ML
10 INJECTION, SOLUTION INTRAMUSCULAR; INTRAVENOUS ONCE AS NEEDED
Status: DISCONTINUED | OUTPATIENT
Start: 2025-07-10 | End: 2025-07-10

## 2025-07-10 RX ORDER — TRANEXAMIC ACID 1 G/10ML
1000 INJECTION, SOLUTION INTRAVENOUS ONCE AS NEEDED
Status: DISCONTINUED | OUTPATIENT
Start: 2025-07-10 | End: 2025-07-12 | Stop reason: HOSPADM

## 2025-07-10 RX ORDER — OXYTOCIN/0.9 % SODIUM CHLORIDE 30/500 ML
60 PLASTIC BAG, INJECTION (ML) INTRAVENOUS ONCE AS NEEDED
Status: DISCONTINUED | OUTPATIENT
Start: 2025-07-10 | End: 2025-07-12 | Stop reason: HOSPADM

## 2025-07-10 RX ORDER — SODIUM CHLORIDE, SODIUM LACTATE, POTASSIUM CHLORIDE, CALCIUM CHLORIDE 600; 310; 30; 20 MG/100ML; MG/100ML; MG/100ML; MG/100ML
75 INJECTION, SOLUTION INTRAVENOUS CONTINUOUS
Status: DISCONTINUED | OUTPATIENT
Start: 2025-07-10 | End: 2025-07-10

## 2025-07-10 RX ORDER — LABETALOL HYDROCHLORIDE 5 MG/ML
20 INJECTION, SOLUTION INTRAVENOUS ONCE AS NEEDED
Status: DISCONTINUED | OUTPATIENT
Start: 2025-07-10 | End: 2025-07-10

## 2025-07-10 RX ORDER — LIDOCAINE HYDROCHLORIDE 10 MG/ML
20 INJECTION, SOLUTION INFILTRATION; PERINEURAL ONCE AS NEEDED
Status: DISCONTINUED | OUTPATIENT
Start: 2025-07-10 | End: 2025-07-10

## 2025-07-10 RX ORDER — OXYTOCIN 10 [USP'U]/ML
10 INJECTION, SOLUTION INTRAMUSCULAR; INTRAVENOUS ONCE AS NEEDED
Status: DISCONTINUED | OUTPATIENT
Start: 2025-07-10 | End: 2025-07-12 | Stop reason: HOSPADM

## 2025-07-10 RX ORDER — METHYLERGONOVINE MALEATE 0.2 MG/ML
0.2 INJECTION INTRAVENOUS ONCE AS NEEDED
Status: DISCONTINUED | OUTPATIENT
Start: 2025-07-10 | End: 2025-07-12 | Stop reason: HOSPADM

## 2025-07-10 RX ORDER — POLYETHYLENE GLYCOL 3350 17 G/17G
17 POWDER, FOR SOLUTION ORAL 2 TIMES DAILY PRN
Status: DISCONTINUED | OUTPATIENT
Start: 2025-07-10 | End: 2025-07-12 | Stop reason: HOSPADM

## 2025-07-10 RX ORDER — ONDANSETRON HYDROCHLORIDE 2 MG/ML
4 INJECTION, SOLUTION INTRAVENOUS EVERY 6 HOURS PRN
Status: DISCONTINUED | OUTPATIENT
Start: 2025-07-10 | End: 2025-07-10

## 2025-07-10 RX ORDER — PHENYLEPHRINE 10 MG/250 ML(40 MCG/ML)IN 0.9 % SOD.CHLORIDE INTRAVENOUS
CONTINUOUS PRN
Status: DISCONTINUED | OUTPATIENT
Start: 2025-07-10 | End: 2025-07-10

## 2025-07-10 RX ORDER — KETOROLAC TROMETHAMINE 30 MG/ML
INJECTION, SOLUTION INTRAMUSCULAR; INTRAVENOUS AS NEEDED
Status: DISCONTINUED | OUTPATIENT
Start: 2025-07-10 | End: 2025-07-10

## 2025-07-10 RX ORDER — OXYTOCIN/0.9 % SODIUM CHLORIDE 30/500 ML
60 PLASTIC BAG, INJECTION (ML) INTRAVENOUS ONCE AS NEEDED
Status: COMPLETED | OUTPATIENT
Start: 2025-07-10 | End: 2025-07-10

## 2025-07-10 RX ORDER — CALCIUM CARBONATE 200(500)MG
1 TABLET,CHEWABLE ORAL EVERY 6 HOURS PRN
Status: DISCONTINUED | OUTPATIENT
Start: 2025-07-10 | End: 2025-07-10

## 2025-07-10 RX ORDER — HYDROMORPHONE HYDROCHLORIDE 1 MG/ML
0.5 INJECTION, SOLUTION INTRAMUSCULAR; INTRAVENOUS; SUBCUTANEOUS
Status: DISCONTINUED | OUTPATIENT
Start: 2025-07-10 | End: 2025-07-10

## 2025-07-10 RX ORDER — FENTANYL/ROPIVACAINE/NS/PF 2MCG/ML-.2
0-25 PLASTIC BAG, INJECTION (ML) INJECTION CONTINUOUS
Refills: 0 | Status: DISCONTINUED | OUTPATIENT
Start: 2025-07-10 | End: 2025-07-10

## 2025-07-10 RX ORDER — FAMOTIDINE 10 MG/ML
INJECTION INTRAVENOUS AS NEEDED
Status: DISCONTINUED | OUTPATIENT
Start: 2025-07-10 | End: 2025-07-10

## 2025-07-10 RX ORDER — FENTANYL CITRATE 50 UG/ML
INJECTION, SOLUTION INTRAMUSCULAR; INTRAVENOUS AS NEEDED
Status: DISCONTINUED | OUTPATIENT
Start: 2025-07-10 | End: 2025-07-10

## 2025-07-10 RX ORDER — ONDANSETRON 4 MG/1
4 TABLET, FILM COATED ORAL EVERY 6 HOURS PRN
Status: DISCONTINUED | OUTPATIENT
Start: 2025-07-10 | End: 2025-07-10

## 2025-07-10 RX ORDER — HYDRALAZINE HYDROCHLORIDE 20 MG/ML
5 INJECTION INTRAMUSCULAR; INTRAVENOUS ONCE AS NEEDED
Status: DISCONTINUED | OUTPATIENT
Start: 2025-07-10 | End: 2025-07-12 | Stop reason: HOSPADM

## 2025-07-10 RX ORDER — DIPHENHYDRAMINE HCL 25 MG
25 CAPSULE ORAL EVERY 6 HOURS PRN
Status: DISCONTINUED | OUTPATIENT
Start: 2025-07-10 | End: 2025-07-12 | Stop reason: HOSPADM

## 2025-07-10 RX ORDER — ONDANSETRON HYDROCHLORIDE 2 MG/ML
4 INJECTION, SOLUTION INTRAVENOUS EVERY 6 HOURS PRN
Status: DISCONTINUED | OUTPATIENT
Start: 2025-07-10 | End: 2025-07-12 | Stop reason: HOSPADM

## 2025-07-10 RX ORDER — SERTRALINE HYDROCHLORIDE 50 MG/1
50 TABLET, FILM COATED ORAL DAILY
Status: DISCONTINUED | OUTPATIENT
Start: 2025-07-10 | End: 2025-07-11

## 2025-07-10 RX ORDER — MISOPROSTOL 200 UG/1
800 TABLET ORAL ONCE AS NEEDED
Status: DISCONTINUED | OUTPATIENT
Start: 2025-07-10 | End: 2025-07-12 | Stop reason: HOSPADM

## 2025-07-10 RX ORDER — OXYTOCIN/0.9 % SODIUM CHLORIDE 30/500 ML
60 PLASTIC BAG, INJECTION (ML) INTRAVENOUS ONCE AS NEEDED
Status: DISCONTINUED | OUTPATIENT
Start: 2025-07-10 | End: 2025-07-10

## 2025-07-10 RX ORDER — LOPERAMIDE HYDROCHLORIDE 2 MG/1
4 CAPSULE ORAL EVERY 2 HOUR PRN
Status: DISCONTINUED | OUTPATIENT
Start: 2025-07-10 | End: 2025-07-10

## 2025-07-10 RX ADMIN — HYDROMORPHONE HYDROCHLORIDE 0.5 MG: 1 INJECTION, SOLUTION INTRAMUSCULAR; INTRAVENOUS; SUBCUTANEOUS at 15:32

## 2025-07-10 RX ADMIN — ONDANSETRON 4 MG: 2 INJECTION, SOLUTION INTRAMUSCULAR; INTRAVENOUS at 16:10

## 2025-07-10 RX ADMIN — ACETAMINOPHEN 975 MG: 325 TABLET ORAL at 23:14

## 2025-07-10 RX ADMIN — SODIUM CHLORIDE, SODIUM LACTATE, POTASSIUM CHLORIDE, AND CALCIUM CHLORIDE 75 ML/HR: .6; .31; .03; .02 INJECTION, SOLUTION INTRAVENOUS at 19:00

## 2025-07-10 RX ADMIN — ACETAMINOPHEN 650 MG: 120 SUPPOSITORY RECTAL at 17:15

## 2025-07-10 RX ADMIN — Medication 60 MILLI-UNITS/MIN: at 14:55

## 2025-07-10 RX ADMIN — PHENYLEPHRINE-NACL IV SOLUTION 10 MG/250ML-0.9% 0.49 MCG/KG/MIN: 10-0.9/25 SOLUTION at 16:07

## 2025-07-10 RX ADMIN — KETOROLAC TROMETHAMINE 30 MG: 30 INJECTION, SOLUTION INTRAMUSCULAR; INTRAVENOUS at 17:04

## 2025-07-10 RX ADMIN — FENTANYL CITRATE 15 MCG: 50 INJECTION, SOLUTION INTRAMUSCULAR; INTRAVENOUS at 17:42

## 2025-07-10 RX ADMIN — FAMOTIDINE 20 MG: 10 INJECTION, SOLUTION INTRAVENOUS at 16:10

## 2025-07-10 RX ADMIN — SODIUM CHLORIDE, SODIUM LACTATE, POTASSIUM CHLORIDE, AND CALCIUM CHLORIDE: 600; 310; 30; 20 INJECTION, SOLUTION INTRAVENOUS at 16:10

## 2025-07-10 RX ADMIN — IBUPROFEN 600 MG: 600 TABLET ORAL at 23:15

## 2025-07-10 SDOH — ECONOMIC STABILITY: FOOD INSECURITY: WITHIN THE PAST 12 MONTHS, YOU WORRIED THAT YOUR FOOD WOULD RUN OUT BEFORE YOU GOT THE MONEY TO BUY MORE.: NEVER TRUE

## 2025-07-10 SDOH — SOCIAL STABILITY: SOCIAL INSECURITY
WITHIN THE LAST YEAR, HAVE YOU BEEN KICKED, HIT, SLAPPED, OR OTHERWISE PHYSICALLY HURT BY YOUR PARTNER OR EX-PARTNER?: NO

## 2025-07-10 SDOH — HEALTH STABILITY: MENTAL HEALTH: HAVE YOU USED ANY SUBSTANCES (CANABIS, COCAINE, HEROIN, HALLUCINOGENS, INHALANTS, ETC.) IN THE PAST 12 MONTHS?: NO

## 2025-07-10 SDOH — SOCIAL STABILITY: SOCIAL INSECURITY
WITHIN THE LAST YEAR, HAVE YOU BEEN RAPED OR FORCED TO HAVE ANY KIND OF SEXUAL ACTIVITY BY YOUR PARTNER OR EX-PARTNER?: NO

## 2025-07-10 SDOH — HEALTH STABILITY: MENTAL HEALTH: WERE YOU ABLE TO COMPLETE ALL THE BEHAVIORAL HEALTH SCREENINGS?: YES

## 2025-07-10 SDOH — HEALTH STABILITY: MENTAL HEALTH: HAVE YOU USED ANY PRESCRIPTION DRUGS OTHER THAN PRESCRIBED IN THE PAST 12 MONTHS?: NO

## 2025-07-10 SDOH — SOCIAL STABILITY: SOCIAL INSECURITY: WITHIN THE LAST YEAR, HAVE YOU BEEN AFRAID OF YOUR PARTNER OR EX-PARTNER?: NO

## 2025-07-10 SDOH — HEALTH STABILITY: MENTAL HEALTH: WISH TO BE DEAD (PAST 1 MONTH): NO

## 2025-07-10 SDOH — SOCIAL STABILITY: SOCIAL INSECURITY: WITHIN THE LAST YEAR, HAVE YOU BEEN HUMILIATED OR EMOTIONALLY ABUSED IN OTHER WAYS BY YOUR PARTNER OR EX-PARTNER?: NO

## 2025-07-10 SDOH — HEALTH STABILITY: MENTAL HEALTH: SUICIDAL BEHAVIOR (LIFETIME): NO

## 2025-07-10 SDOH — SOCIAL STABILITY: SOCIAL INSECURITY: HAS ANYONE EVER THREATENED TO HURT YOUR FAMILY OR YOUR PETS?: NO

## 2025-07-10 SDOH — SOCIAL STABILITY: SOCIAL INSECURITY: ARE YOU OR HAVE YOU BEEN THREATENED OR ABUSED PHYSICALLY, EMOTIONALLY, OR SEXUALLY BY ANYONE?: NO

## 2025-07-10 SDOH — SOCIAL STABILITY: SOCIAL INSECURITY: DOES ANYONE TRY TO KEEP YOU FROM HAVING/CONTACTING OTHER FRIENDS OR DOING THINGS OUTSIDE YOUR HOME?: NO

## 2025-07-10 SDOH — ECONOMIC STABILITY: FOOD INSECURITY: WITHIN THE PAST 12 MONTHS, THE FOOD YOU BOUGHT JUST DIDN'T LAST AND YOU DIDN'T HAVE MONEY TO GET MORE.: NEVER TRUE

## 2025-07-10 SDOH — SOCIAL STABILITY: SOCIAL INSECURITY: DO YOU FEEL ANYONE HAS EXPLOITED OR TAKEN ADVANTAGE OF YOU FINANCIALLY OR OF YOUR PERSONAL PROPERTY?: NO

## 2025-07-10 SDOH — HEALTH STABILITY: MENTAL HEALTH: NON-SPECIFIC ACTIVE SUICIDAL THOUGHTS (PAST 1 MONTH): NO

## 2025-07-10 SDOH — SOCIAL STABILITY: SOCIAL INSECURITY: PHYSICAL ABUSE: DENIES

## 2025-07-10 SDOH — ECONOMIC STABILITY: HOUSING INSECURITY: DO YOU FEEL UNSAFE GOING BACK TO THE PLACE WHERE YOU ARE LIVING?: NO

## 2025-07-10 SDOH — SOCIAL STABILITY: SOCIAL INSECURITY: HAVE YOU HAD ANY THOUGHTS OF HARMING ANYONE ELSE?: NO

## 2025-07-10 SDOH — SOCIAL STABILITY: SOCIAL INSECURITY: VERBAL ABUSE: DENIES

## 2025-07-10 SDOH — SOCIAL STABILITY: SOCIAL INSECURITY: ABUSE SCREEN: ADULT

## 2025-07-10 SDOH — SOCIAL STABILITY: SOCIAL INSECURITY: HAVE YOU HAD THOUGHTS OF HARMING ANYONE ELSE?: NO

## 2025-07-10 SDOH — SOCIAL STABILITY: SOCIAL INSECURITY: ARE THERE ANY APPARENT SIGNS OF INJURIES/BEHAVIORS THAT COULD BE RELATED TO ABUSE/NEGLECT?: NO

## 2025-07-10 ASSESSMENT — ACTIVITIES OF DAILY LIVING (ADL): LACK_OF_TRANSPORTATION: NO

## 2025-07-10 ASSESSMENT — LIFESTYLE VARIABLES
AUDIT-C TOTAL SCORE: 0
HOW MANY STANDARD DRINKS CONTAINING ALCOHOL DO YOU HAVE ON A TYPICAL DAY: PATIENT DOES NOT DRINK
AUDIT-C TOTAL SCORE: 0
SKIP TO QUESTIONS 9-10: 1
HOW OFTEN DO YOU HAVE A DRINK CONTAINING ALCOHOL: NEVER
HOW OFTEN DO YOU HAVE 6 OR MORE DRINKS ON ONE OCCASION: NEVER

## 2025-07-10 ASSESSMENT — PATIENT HEALTH QUESTIONNAIRE - PHQ9
2. FEELING DOWN, DEPRESSED OR HOPELESS: NOT AT ALL
1. LITTLE INTEREST OR PLEASURE IN DOING THINGS: NOT AT ALL
SUM OF ALL RESPONSES TO PHQ9 QUESTIONS 1 & 2: 0

## 2025-07-10 ASSESSMENT — PAIN SCALES - GENERAL
PAINLEVEL_OUTOF10: 0 - NO PAIN
PAIN_LEVEL: 1
PAINLEVEL_OUTOF10: 0 - NO PAIN
PAINLEVEL_OUTOF10: 4
PAINLEVEL_OUTOF10: 0 - NO PAIN

## 2025-07-10 NOTE — OP NOTE
Date: 7/10/2025  OR Location: [facility] [place] [facility]    Name: Jewel Rodriguez, : 1990, Age: 35 y.o., MRN: [id number], Sex: female    Diagnosis  Pre-op Diagnosis      * Normal labor (HHS-HCC) [O80, Z37.9] Post-op Diagnosis     * Normal labor (HHS-HCC) [O80, Z37.9]     Procedures  TRACHELORRHAPHY  38645 - [place] TRACHELORRHAPHY PLSTC RPR UTERINE CERVIX VAG      Surgeons      * Bettie Cobb - Primary    Resident/Fellow/Other Assistant:  Surgeons and Role:     * Jeremie Carter MD - Resident - Assisting    Staff:   Scrub Person: Massiel    Anesthesia Staff: Anesthesiologist: Terry Drake DO  C-AA: WILLY Haskins    Procedure Summary  Anesthesia: Spinal  ASA: III  Estimated Blood Loss: 225 mL  Intra-op Medications:   Administrations occurring from 1410 to 1725 on 07/10/25:   Medication Name Total Dose   HYDROmorphone (Dilaudid) injection 0.5 mg 0.5 mg   oxytocin (Pitocin) bolus from bag 18,000 héctor-units   oxytocin (Pitocin) infusion in sodium chloride 0.9% 30 units/500 mL Cannot be calculated              Anesthesia Record               Intraprocedure I/O Totals          Intake    LR bolus 700.00 mL    Phenylephrine Drip 0.00 mL    The total shown is the total volume documented since Anesthesia Start was filed.    Total Intake 700 mL       Output    Total Blood Loss - Surgical Delivery (mL) 225 mL    Total Output 225 mL       Net    Net Volume 475 mL          Specimen: No specimens collected     Findings: Normal external genitalia. Approximately 4 cm laceration at 2-3 o'clock on the cervix with bleeding abrasions at 12, 8 and 6 o'clock. No other lacerations appreciated. Fundus firm below the umbilicus.     Procedure Details:  The patient was seen in the preoperative area. The site of surgery was properly noted/marked if necessary per policy. The patient has been actively warmed in preoperative area. Preoperative antibiotics are not indicated. Venous thrombosis prophylaxis have been ordered including bilateral  sequential compression devices.    Pt was taken to the OR where spinal epidural anesthesia was administered. She was then placed in the dorsal supine position and SCDs were applied. A pre-procedure time out was performed. She was then prepped and draped in the usual sterile fashion. Right angle retractors were then placed into the vagina to visualize the cervix and grasped with multiple ring forceps to run the cervix. A 4 cm laceration was noted at 2-3 o'clock of the cervix; repaired with a running stitch of 2-0 Vicryl. Later found to be hemostatic with a figure of eight stitch of the same suture along the repair. Small bleeding was then noted at 12 and 6 o'clock; eventually found to be hemostatic with figure of eight stitches of 2-0 Vicryl. The uterus was then expressed for small clots and found to be firm below the level of the umbilicus. Small bleeding was still noted to be coming from an abrasion at 6 o'clock and was then found to be hemostatic with a figure of eight stitch of 3-0 Monocryl. The entire face of the cervix was found to be hemostatic at that time. The urethra was then prepped with betadine and drained of approximately 150 ml of straw colored urine. All counts were correct per nursing staff, the patient tolerated the procedure well.  Dr. Cobb was present for the entire procedure. The patient was taken back to the labor and delivery room in stable condition.    Complications:  None; patient tolerated the procedure well.     Disposition: Labor & Delivery room  Condition: stable

## 2025-07-10 NOTE — ANESTHESIA PROCEDURE NOTES
Spinal Block    Start time: 7/10/2025 4:20 PM  End time: 7/10/2025 4:26 PM  Reason for block: primary anesthetic  Staffing  Performed: KAYLEY   Authorized by: WILLY Haskins    Performed by: WILLY Haskins    Preanesthetic Checklist  Completed: patient identified, IV checked, risks and benefits discussed, surgical consent, monitors and equipment checked, pre-op evaluation, timeout performed and sterile techniques followed  Block Timeout  RN/Licensed healthcare professional reads aloud to the Anesthesia provider and entire team: Patient identity, procedure with side and site, patient position, and as applicable the availability of implants/special equipment/special requirements.  Patient on coagulant treatment: no  Timeout performed at: 7/10/2025 4:19 PM  Spinal Block  Patient position: sitting  Prep: ChloraPrep  Sterility prep: cap, drape, gloves, hand hygiene and mask  Sedation level: no sedation  Patient monitoring: blood pressure, EKG, continuous pulse oximetry and heart rate  Approach: midline  Vertebral space: L3-4  Injection technique: single-shot  Needle  Needle type: PenCan.   Needle gauge: 24 G  Needle length: 4 in  Free flowing CSF: yes    Assessment  Block outcome: patient comfortable  Procedure assessment: patient tolerated procedure well with no immediate complications

## 2025-07-10 NOTE — ANESTHESIA POSTPROCEDURE EVALUATION
Patient: Jewel Rodriguez    Procedure Summary       Date: 07/10/25 Room / Location: [facility]    Anesthesia Start: 1610 Anesthesia Stop:     Procedure: TRACHELORRHAPHY Diagnosis:       Normal labor (HHS-HCC)      (Normal labor (HHS-HCC) [O80, Z37.9])    Surgeons: Bettie Cobb MD Responsible Provider: Terry Drake DO    Anesthesia Type: spinal ASA Status: 3            Anesthesia Type: spinal    Vitals Value Taken Time   /70 07/10/25 17:42   Temp 36.1 °C (97 °F) 07/10/25 17:27   Pulse 69 07/10/25 17:48   Resp 18 07/10/25 17:27   SpO2 100 % 07/10/25 17:48       Anesthesia Post Evaluation    Patient location during evaluation: bedside  Patient participation: complete - patient participated  Level of consciousness: awake  Pain score: 1  Pain management: adequate  Airway patency: patent  Cardiovascular status: acceptable  Respiratory status: acceptable  Hydration status: acceptable  Postoperative Nausea and Vomiting: none        No notable events documented.

## 2025-07-10 NOTE — L&D DELIVERY NOTE
Vaginal Delivery Note    Patient Name: Jewel Rodriguez  : 1990  MRN: [id number]  Age: 35 y.o.    /Para:   Gestational Age: 37w2d    Date of Delivery: 7/10/2025    Procedure: Normal Spontaneous Vaginal Delivery    Delivery Provider:   Bettie Cobb - Attending     Resident/Fellow/Other Assistant:   Jeremie Carter - Resident  Claire Rivera - Resident     Description of Procedure:  Patient reported pain at incision sit and increased bleeding noted. Patient declined fetal monitoring. Discussed with patient recommendation for urgent  birth due to increased bleeding and pain. Anaesthesia bedside for IV placement. During IV placement patient pushing. Fetal decent noted with cervix noted to be approx 6-7cm. Patient continued to push due to discomfort despite counseling. Delivery of viable infant under no anesthesia. Delayed clamping was performed. The infant was placed skin to skin. Cord gases were not sent.  Cord blood was collected. Placenta delivered intact and fundus was firm. Bleeding noted from cervix, Approx 6cc of local anasthestic placed in cervix. The cervix was evaluated using ring forceps and repair was began at approx 12 o'clock. Upon further evalaution a large cervical laceration was noted and was discussed with the patient. The recommendation was made to proceed to OR for cervical laceration repair. Patient agreeable. Anesthesia notified.       Additional Procedures:  None    Findings:   Amniotic fluid Clear, Female infant in Vertex Occiput Anterior presentation, APGARS 9 , 9 .  Birth Weight 2.853 kg.    Complications: Other Excessive Bleeding    Quantitative Blood Loss:   Delivery QBL: 725 mL (7/10/2025  2:53 PM - 7/10/2025  5:57 PM)    Blood products:      Uterotonics/Hemostatic Agent: IV Pitocin 30 units    Specimen:   Placenta  Delivered: 7/10/2025  2:57 PM  Appearance: Intact  Removal: Spontaneous    Disposition: pathology    Sponge/Instrument/Needle Counts: The sponge, lap  and needle counts were correct.    Patient Disposition: Patient recovering on labor and delivery in stable condition.    Michelle Rodriguez [[id number]]      Labor Events    Sac identifier: Sac 1  Rupture date/time: 7/10/2025 1452  Rupture type: Artificial  Fluid color: Clear  Fluid odor: None  Labor type: Spontaneous Onset of Labor  Labor allowed to proceed with plans for an attempted vaginal birth?: Yes  Augmentation: None  Complications: Other Excessive Bleeding       Labor Event Times    Labor onset date/time: 7/10/2025 1437  Dilation complete date/time: 7/10/2025 1452  Start pushing date/time: 7/10/2025 14:52       Labor Length    1st stage: 0h 15m  2nd stage: 0h 01m  3rd stage: 0h 04m       Placenta    Placenta delivery date/time: 7/10/2025 14:57  Placenta removal: Spontaneous  Placenta appearance: Intact  Placenta disposition: pathology       Cord    Vessels: 3 vessels  Complications: None  Delayed cord clamping?: Yes  Cord blood disposition: Lab  Gases sent?: No  Stem cell collection (by provider): No       Lacerations    Episiotomy: None  Perineal laceration: None  Cervical laceration?: Yes  Cervical laceration location: bilateral  Cervical laceration repaired?: Yes  Repair suture: 3-0 Synthetic Suture       Anesthesia    Method: None       Operative Delivery    Forceps attempted?: No  Vacuum extractor attempted?: No       Shoulder Dystocia    Shoulder dystocia present?: No       [place] Delivery    Birth date/time: 7/10/2025 14:53:00  Delivery type: Vaginal, Spontaneous  Complications: Other Excessive Bleeding       Resuscitation    Method: Tactile stimulation       Apgars    Living status: Living  Apgar Component Scores:  1 min.:  5 min.:  10 min.:  15 min.:  20 min.:    Skin color:  1  1       Heart rate:  2  2       Reflex irritability:  2  2       Muscle tone:  2  2       Respiratory effort:  2  2       Total:  9  9       Apgars assigned by: TIMOTHY SHAFFER RN       Delivery Providers    Delivering  clinician: Bettie Cobb MD   Provider Role    Renea Nova, RN Delivery Nurse    Mitra Guillen, RN Nursery Nurse    Claire Rivera MD Resident    Jeremie Carter MD Resident

## 2025-07-10 NOTE — ANESTHESIA PREPROCEDURE EVALUATION
Patient: Jewel Rodriguez    Evaluation Method: In-person visit    Procedure Information    Anesthesia Start Date/Time: 07/10/25 1610  Procedure: Procedure Not Yet Scheduled         Relevant Problems   Neuro   (+) Panic attacks      GI   (+) Hiatal hernia      Gastrointestinal and Abdominal   (+) History of esophagectomy (Hx of Achalasia s/p repair  - long history of achalasia since childhood requiring multiple surgeries. Most recently in 3/2024 she had a para-conduit hiatal hernia repair without mesh, redo laparotomy and lysis of adhesions following admission for nausea and vomiting     )       Clinical information reviewed:    Allergies  Meds               NPO Detail:  No data recorded     OB/Gyn Evaluation    Present Pregnancy    Patient is pregnant now.  (+) , previous  section - primary   Obstetric History    (+) vaginal birth after  (2x  after PLTCS)          PHYSICAL EXAM    Anesthesia Plan    History of general anesthesia?: yes  History of complications of general anesthesia?: no    ASA 3     spinal     Anesthetic plan and risks discussed with patient.    Plan discussed with CAA and attending.

## 2025-07-10 NOTE — H&P
Obstetrical Admission History and Physical    Assessment/Plan    Jewel Rodriguez is a 35 y.o.  at 37w2d by 7w6d US who presented from the ED in labor     Labor  - Admitted, consented, scanned - cephalic   - T&S, CBC on admission- still pending   - Epidural available as requested  - PPB: declines    Potential placental abruption  - Patient with vaginal bleeding and continually bleeding after SROM  - Sent coags, fibrinogen, CBC    Hx of Achalasia s/p repair  - long history of achalasia since childhood requiring multiple surgeries. Most recently in 3/2024 she had a para-conduit hiatal hernia repair without mesh, redo laparotomy and lysis of adhesions following admission for nausea and vomiting   - She was seen by Dr. Bansal on 3/19/25     Hx of  x2  - three total vaginal deliveries (one prior to her PLTCD)  - MFMU calculator suggests a 96.1% success rate     Panic Attacks  - prescribed Zoloft 50 mg daily but is not taking  - declines a Behavioral Health referral     Prior  Delivery at 36 weeks gestation  - Patient endorses that last two deliveries were      Fetal Wellbeing   - Baby not on continual monitoring due to patient taking off external monitors   - GBS negative  - EFW 3122g 82%, AC 96% (US on )    Patient seen and discussed with Dr. Carter and Dr. Jordyn Ibarra MD  PGY-1 Obstetrics and Gynecology     Subjective   Jewel Rodriguez is a 35 y.o.  at 37w2d by 7w6d US who presented from the ED in labor.    Patient came into triage precipitously delivering. Patient checked when first in triage and was 3/50/-2, then made progress to 4/50/-2 in 30 minutes. Patient to 6 cm within 1 hour. Patient then SROMd and had gush of blood concerning for placental abruption or uterine rupture. Patient stated abdominal pain was constant and not intermittent. Baby was on monitor briefly at 1445 Patient quickly made cervical change and delivered.     Pregnancy notable for:  -  hx of  x2 (3 total VD, one prior to PLTCD), MFMU 96.1%  - panic attacks, zoloft 50 mg  - hx of prior  delivery @ 36 wks  - hx of concern for placental abruption     Obstetrical History   OB History    Para Term  AB Living   7 4 4  2 4   SAB IAB Ectopic Multiple Live Births    2   4      # Outcome Date GA Lbr Salazar/2nd Weight Sex Type Anes PTL Lv   7 Current            6 Term 22    F    AMELIA   5 Term 17    F    AMELIA   4 Term 13    M CS-LTranv   AMELIA      Complications: Abruptio Placenta   3 Term 10/26/09    F Vag-Spont   AMELIA   2 IAB            1 IAB                Past Medical History  Medical History[1]     Past Surgical History   Surgical History[2]    Social History  Social History     Tobacco Use    Smoking status: Never    Smokeless tobacco: Never   Substance Use Topics    Alcohol use: Never     Substance and Sexual Activity   Drug Use Never       Allergies  Penicillins     Medications  Prescriptions Prior to Admission[3]    Objective    Last Vitals  Temp Pulse Resp BP MAP O2 Sat   36.7 °C (98 °F) 72 16     98 %     Physical Examination  General: no acute distress  HEENT: normocephalic, atraumatic  Heart: warm and well perfused  Lungs: breathing comfortably on room air  Abdomen: gravid  Extremities: moving all extremities  Neuro: awake and conversant  Psych: appropriate mood and affect  SVE: 3/50/-2    FHT: unable to assess  [place]: unable to assess  BSUS: cephalic    Lab Review  Labs in chart were reviewed.  CBC     Coag     Fibrinogen            Prenatal labs reviewed, not remarkable.         [1]   Past Medical History:  Diagnosis Date    Achalasia     Encounter for screening for malignant neoplasm of vagina     Vaginal Pap smear    Mild hyperemesis gravidarum ([facility])     Hyperemesis gravidarum    Other conditions influencing health status     Menstruation    Other conditions influencing health status     H/O pregnancy    Other specified noninflammatory disorders  of vagina     Vaginal odor    Other specified noninflammatory disorders of vagina     Vaginal irritation    Other specified noninflammatory disorders of vagina     Vaginal itching    Personal history of other diseases of the female genital tract     Personal history of amenorrhea    Personal history of other diseases of the female genital tract     History of vaginal discharge    Personal history of other infectious and parasitic diseases     History of gonorrhea    Personal history of other mental and behavioral disorders     History of depression    Personal history of other specified conditions     History of abnormal Pap smear    Unspecified abdominal pain 2013    Abdominal pain   [2]   Past Surgical History:  Procedure Laterality Date     SECTION, CLASSIC  04/15/2014     Section    COLPOSCOPY  04/15/2014    Colposcopy    OTHER SURGICAL HISTORY  08/10/2017    Esophagectomy    OTHER SURGICAL HISTORY  2017    Esophageal Surgery Heller Myotomy    OTHER SURGICAL HISTORY  04/15/2014    Upper Gastrointestinal Endoscopy (Therapeutic)   [3]   Medications Prior to Admission   Medication Sig Dispense Refill Last Dose/Taking    acetaminophen (Tylenol) 325 mg tablet Take 2 tablets (650 mg) by mouth every 6 hours if needed for mild pain (1 - 3).       docusate sodium (Colace) 100 mg capsule Take 1 capsule (100 mg) by mouth 2 times a day as needed for constipation. 30 capsule 5     famotidine (Pepcid) 20 mg tablet Take 1 tablet (20 mg) by mouth 2 times a day. 90 tablet 1     ferrous sulfate (FeosoL) 325 mg (65 mg elemental) tablet Take 1 tablet every other day on an empty stomach, preferably with orange juice or something acidic to improve absorption. 60 tablet 3     ibuprofen 100 mg/5 mL suspension Take 10 mL (200 mg) by mouth every 6 hours if needed for mild pain (1 - 3). (Patient not taking: Reported on 2025) 300 mL 0     lidocaine 4 % patch Place 1 patch over 12 hours on the skin once  daily. Remove & discard patch within 12 hours or as directed by MD. 15 patch 0     prenatal vitamin, iron-folic, (Prenatal) 27 mg iron-800 mcg folic acid tablet Take 1 tablet by mouth once daily.       sennosides-docusate sodium (Tiara-Colace) 8.6-50 mg tablet Take 1 tablet by mouth 2 times a day. 30 tablet 0     sertraline (Zoloft) 50 mg tablet Take 1 tablet (50 mg) by mouth once daily. 60 tablet 3

## 2025-07-11 PROBLEM — S37.63XA CERVICAL LACERATION: Status: ACTIVE | Noted: 2025-07-11

## 2025-07-11 LAB
ERYTHROCYTE [DISTWIDTH] IN BLOOD BY AUTOMATED COUNT: 16.4 % (ref 11.5–14.5)
HCT VFR BLD AUTO: 33.7 % (ref 36–46)
HGB BLD-MCNC: 10.4 G/DL (ref 12–16)
MCH RBC QN AUTO: 27.1 PG (ref 26–34)
MCHC RBC AUTO-ENTMCNC: 30.9 G/DL (ref 32–36)
MCV RBC AUTO: 88 FL (ref 80–100)
NRBC BLD-RTO: 0 /100 WBCS (ref 0–0)
PLATELET # BLD AUTO: 251 X10*3/UL (ref 150–450)
RBC # BLD AUTO: 3.84 X10*6/UL (ref 4–5.2)
WBC # BLD AUTO: 14.7 X10*3/UL (ref 4.4–11.3)

## 2025-07-11 PROCEDURE — 1100000001 HC PRIVATE ROOM DAILY

## 2025-07-11 PROCEDURE — 2500000001 HC RX 250 WO HCPCS SELF ADMINISTERED DRUGS (ALT 637 FOR MEDICARE OP): Mod: SE

## 2025-07-11 PROCEDURE — 36415 COLL VENOUS BLD VENIPUNCTURE: CPT

## 2025-07-11 PROCEDURE — 85027 COMPLETE CBC AUTOMATED: CPT

## 2025-07-11 PROCEDURE — 2500000005 HC RX 250 GENERAL PHARMACY W/O HCPCS: Mod: SE

## 2025-07-11 PROCEDURE — 2500000001 HC RX 250 WO HCPCS SELF ADMINISTERED DRUGS (ALT 637 FOR MEDICARE OP): Mod: SE | Performed by: FAMILY MEDICINE

## 2025-07-11 RX ORDER — OXYCODONE HYDROCHLORIDE 5 MG/1
5 TABLET ORAL EVERY 4 HOURS PRN
Status: DISCONTINUED | OUTPATIENT
Start: 2025-07-11 | End: 2025-07-12 | Stop reason: HOSPADM

## 2025-07-11 RX ORDER — OXYCODONE HYDROCHLORIDE 10 MG/1
10 TABLET ORAL EVERY 4 HOURS PRN
Status: DISCONTINUED | OUTPATIENT
Start: 2025-07-11 | End: 2025-07-12 | Stop reason: HOSPADM

## 2025-07-11 RX ORDER — OXYCODONE HYDROCHLORIDE 5 MG/1
5 TABLET ORAL ONCE
Refills: 0 | Status: COMPLETED | OUTPATIENT
Start: 2025-07-11 | End: 2025-07-11

## 2025-07-11 RX ORDER — OXYCODONE HYDROCHLORIDE 10 MG/1
10 TABLET ORAL ONCE
Refills: 0 | Status: COMPLETED | OUTPATIENT
Start: 2025-07-11 | End: 2025-07-11

## 2025-07-11 RX ORDER — LIDOCAINE 560 MG/1
1 PATCH PERCUTANEOUS; TOPICAL; TRANSDERMAL DAILY
Status: DISCONTINUED | OUTPATIENT
Start: 2025-07-11 | End: 2025-07-12 | Stop reason: HOSPADM

## 2025-07-11 RX ORDER — NALOXONE HYDROCHLORIDE 0.4 MG/ML
0.2 INJECTION, SOLUTION INTRAMUSCULAR; INTRAVENOUS; SUBCUTANEOUS EVERY 5 MIN PRN
Status: DISCONTINUED | OUTPATIENT
Start: 2025-07-11 | End: 2025-07-12 | Stop reason: HOSPADM

## 2025-07-11 RX ORDER — OXYCODONE HCL 10 MG/1
10 TABLET, FILM COATED, EXTENDED RELEASE ORAL ONCE
Refills: 0 | Status: DISCONTINUED | OUTPATIENT
Start: 2025-07-11 | End: 2025-07-11

## 2025-07-11 RX ADMIN — LIDOCAINE 1 PATCH: 4 PATCH TOPICAL at 10:44

## 2025-07-11 RX ADMIN — OXYCODONE HYDROCHLORIDE 5 MG: 5 TABLET ORAL at 05:42

## 2025-07-11 RX ADMIN — ACETAMINOPHEN 975 MG: 325 TABLET ORAL at 18:07

## 2025-07-11 RX ADMIN — OXYCODONE HYDROCHLORIDE 10 MG: 10 TABLET ORAL at 10:44

## 2025-07-11 RX ADMIN — OXYCODONE HYDROCHLORIDE 10 MG: 10 TABLET ORAL at 18:08

## 2025-07-11 RX ADMIN — IBUPROFEN 600 MG: 600 TABLET ORAL at 12:05

## 2025-07-11 RX ADMIN — IBUPROFEN 600 MG: 600 TABLET ORAL at 18:07

## 2025-07-11 RX ADMIN — IBUPROFEN 600 MG: 600 TABLET ORAL at 05:41

## 2025-07-11 RX ADMIN — ACETAMINOPHEN 975 MG: 325 TABLET ORAL at 12:05

## 2025-07-11 RX ADMIN — ACETAMINOPHEN 975 MG: 325 TABLET ORAL at 05:41

## 2025-07-11 RX ADMIN — BENZOCAINE AND LEVOMENTHOL 1 APPLICATION: 200; 5 SPRAY TOPICAL at 03:59

## 2025-07-11 ASSESSMENT — PAIN SCALES - GENERAL
PAINLEVEL_OUTOF10: 0 - NO PAIN
PAINLEVEL_OUTOF10: 8
PAINLEVEL_OUTOF10: 8
PAINLEVEL_OUTOF10: 7
PAINLEVEL_OUTOF10: 3

## 2025-07-11 ASSESSMENT — PAIN DESCRIPTION - DESCRIPTORS
DESCRIPTORS: DISCOMFORT;SORE
DESCRIPTORS: SORE

## 2025-07-11 NOTE — LACTATION NOTE
Lactation Consultant Note  Lactation Consultation  Reason for Consult: Initial assessment  Consultant Name: JUAN Barroso RN IBCLC    Maternal Information  Has mother  before?: Yes  How long did the mother previously breastfeed?: 4 other children 3-7 months  Previous Maternal Breastfeeding Challenges: None  Infant to breast within first 2 hours of birth?: Yes  Exclusive Pump and Bottle Feed: No    Maternal Assessment  Breast Assessment: Medium, Symmetrical, Soft, Compressible  Nipple Assessment: Intact, Erect  [place] Assessment: Normal    Infant Assessment  Infant Behavior: Deep sleep    Feeding Assessment  Nutrition Source: Breastmilk  Feeding Method: Nursing at the breast  Latch Assessment: No latch achieved    LATCH TOOL       Breast Pump       Other OB Lactation Tools       Patient Follow-up  Inpatient Lactation Follow-up Needed : Yes    Other OB Lactation Documentation  Maternal Risk Factors: Age over 30, primiparity  Infant Risk Factors: Early term birth 37-39 weeks    Recommendations/Summary    Infant at breast, finishing a feeding when I came into the room. The mother reports experience nursing 4 older children from 3 to 7 months. She has been independently latching her  to the breast. She denies any difficulty with latching her  or pain/discomfort while breastfeeding. Her only concern is that her infant has been sleepy at the breast. We discussed early  feeding patterns and behaviors, especially in the first 24 hours of life. (The infant was about 21 hours old at the time of the visit.) The mother was also educated on the following breastfeeding topics: the benefits of skin to skin for breastfeeding; frequent feeds with goal of 8-12 feeds/24 hours; the importance of a deep latch for maternal comfort and optimal milk production/transfer; alternating starting breast and allowing the infant to end the feeding; and, the rationale for delaying pumping (unless clinically indicated)  and pacifier use until breastfeeding is well-established. The mother was given written information on outpatient lactation services. She does not have a breast pump for home use. A Spectra breast pump was ordered for her from [facility] through her insurance company. The mother denies any questions. She is asked to call for observation of her next breastfeeding.

## 2025-07-11 NOTE — CARE PLAN
The patient's goals for the shift include rest and pain control    The clinical goals for the shift include WNL vital signs and assessments    Problem: Postpartum  Goal: Experiences normal postpartum course  Outcome: Progressing  Goal: Appropriate maternal -  bonding  Outcome: Progressing  Goal: Establish and maintain infant feeding pattern for adequate nutrition  Outcome: Progressing  Goal: Incisions, wounds, or drain sites healing without S/S of infection  Outcome: Progressing  Goal: No s/sx infection  Outcome: Progressing  Goal: No s/sx of hemorrhage  Outcome: Progressing  Goal: Minimal s/sx of HDP and BP<160/110  Outcome: Progressing     Problem: Safety - Adult  Goal: Free from fall injury  Outcome: Progressing    Vital signs stable throughout the shift, lochia has been WNL, patient is without s/s of HDP. Patient is bonding well with her !

## 2025-07-11 NOTE — PROGRESS NOTES
Postpartum Progress Note    Assessment/Plan   Jewel Rodriguez is a 35 y.o., , who delivered at 37w2d gestation via Vaginal, Spontaneous and is s/p repair of 4 cm cervical laceration in the OR.    Now PPD#1 s/p Vaginal, Spontaneous on 7/10/2025  - Continue routine postpartum care  - Pain moderately controlled on po medications  - DVT risk score 3, ppx with SCDs and ambulation  - RH positive, rhogam not indicated  - Hgb:   Results from last 7 days   Lab Units 25  0700 07/10/25  1535   HEMOGLOBIN g/dL 10.4* 10.6*      Pain Management  - lidocaine patch ordered for lower back pain  - Given significant pain after cervical laceration repair, one dose of oxycodone 10 mg ordered  - Continue Tylenol and ibuprofen  - Encouraged use of ice and heat packs    Acute blood loss anemia  - Hgb trend as above  - asymptomatic, VSS; for PO Fe on DC  - continue to monitor for s/sx anemia      Maternal Well-Being  - Vitals stable  - All questions and concerns address    [place] Feeding  - Breastfeeding/pumping encouraged  - Lactation consult prn    Contraception  - desires tubal, will discuss at PPV  - Education provided    Dispo  - Anticipate d/c on PPD #2-3 if meeting all postpartum milestones  - Follow-up in 4-6wks with primary OGYN  -Follow up in [facility] clinic    Catrina Willson MD PGY-1    Assessment & Plan  Normal labor ([facility])    History of esophagectomy    Pregnancy Problems (from 24 to present)       Problem Noted Diagnosed Resolved    History of successful vaginal birth after , currently pregnant ([facility]) 6/15/2025 by Jessica Lockhart  No    Priority:  Medium       History of  delivery, currently pregnant ([facility]) 6/15/2025 by Jessica Lockhart  No    Priority:  Medium       Achalasia of esophagus 3/23/2025 by SHERICE Mckinney-CNP  No    Priority:  Medium       Overview Signed 3/23/2025  7:26 PM by HOLLY Mckinney   - long history of achalasia since childhood requiring multiple  surgeries. Most recently in 3/2024 she had a para-conduit hiatal hernia repair without mesh, redo laparotomy and lysis of adhesions following admission for nausea and vomiting   - currently asymptomatic. Has intermittent episodes of heartburn but declines medications for GERD at this time  - discussed low-threshold to present to the ED/OB Triage for N/V due to patient's history                 Subjective     Jewel Rodriguez is PPD#1 s/p vaginal delivery. She reports that she is in a significant amount of pain. She received a dose of oxycodone 5 mg at 0700 which she says helped for a short period of time. After a one time does of oxycodone 10 mg, she reports that her pain is much improved. She is willing to try ice and heat packs for continued pain management. She also endorses some lower back pain, lidocaine patches were ordered.    Her pain is moderately controlled with current medications  She is not yet passing flatus  She is ambulating well  She is tolerating a Adult diet Regular  She reports no breast or nursing problems  She denies emotional concerns today      Denies dizziness/lightheadedness, SOB, palpitations, extreme fatigue, heavy bleeding     Objective   Allergies:   Penicillins         Last Vitals:  Temp Pulse Resp BP MAP Pulse Ox   36.5 °C (97.7 °F) 79 18 106/63   97 %     Vitals Min/Max Last 24 Hours:  Temp  Min: 36 °C (96.8 °F)  Max: 36.8 °C (98.2 °F)  Pulse  Min: 62  Max: 127  Resp  Min: 16  Max: 18  BP  Min: 100/62  Max: 118/71    Intake/Output:     Intake/Output Summary (Last 24 hours) at 7/11/2025 1154  Last data filed at 7/11/2025 1122  Gross per 24 hour   Intake 700 ml   Output 1375 ml   Net -675 ml     Physical Exam:  General: Examination reveals a well developed, well nourished, female, in no acute distress. She is alert and cooperative.  Lungs: symmetrical, non-labored breathing.  Cardiac: warm, well-perfused.  Abdomen: soft, appropriately tender  Fundus: firm, below umbilicus,  appropriately tender  Extremities: no redness or tenderness in the calves or thighs.  Neurological: alert, oriented, normal speech, no focal findings or movement disorder noted.     Lab Data:  Labs in chart were reviewed.

## 2025-07-12 VITALS
OXYGEN SATURATION: 96 % | RESPIRATION RATE: 17 BRPM | DIASTOLIC BLOOD PRESSURE: 74 MMHG | WEIGHT: 151 LBS | HEIGHT: 66 IN | SYSTOLIC BLOOD PRESSURE: 118 MMHG | TEMPERATURE: 97.9 F | HEART RATE: 78 BPM | BODY MASS INDEX: 24.27 KG/M2

## 2025-07-12 PROCEDURE — 2500000001 HC RX 250 WO HCPCS SELF ADMINISTERED DRUGS (ALT 637 FOR MEDICARE OP): Mod: SE | Performed by: FAMILY MEDICINE

## 2025-07-12 PROCEDURE — 99239 HOSP IP/OBS DSCHRG MGMT >30: CPT

## 2025-07-12 PROCEDURE — 2500000001 HC RX 250 WO HCPCS SELF ADMINISTERED DRUGS (ALT 637 FOR MEDICARE OP): Mod: SE

## 2025-07-12 PROCEDURE — 2500000004 HC RX 250 GENERAL PHARMACY W/ HCPCS (ALT 636 FOR OP/ED): Mod: SE

## 2025-07-12 RX ORDER — LIDOCAINE 560 MG/1
1 PATCH PERCUTANEOUS; TOPICAL; TRANSDERMAL DAILY
Qty: 5 PATCH | Refills: 0 | Status: SHIPPED | OUTPATIENT
Start: 2025-07-12

## 2025-07-12 RX ORDER — HYDROXYZINE HYDROCHLORIDE 25 MG/1
25 TABLET, FILM COATED ORAL EVERY 6 HOURS PRN
Qty: 20 TABLET | Refills: 0 | Status: SHIPPED | OUTPATIENT
Start: 2025-07-12

## 2025-07-12 RX ORDER — CYCLOBENZAPRINE HCL 5 MG
5 TABLET ORAL 3 TIMES DAILY PRN
Qty: 12 TABLET | Refills: 0 | Status: SHIPPED | OUTPATIENT
Start: 2025-07-12

## 2025-07-12 RX ORDER — NALOXONE HYDROCHLORIDE 4 MG/.1ML
1 SPRAY NASAL AS NEEDED
Qty: 1 EACH | Refills: 0 | Status: SHIPPED | OUTPATIENT
Start: 2025-07-12

## 2025-07-12 RX ORDER — POLYETHYLENE GLYCOL 3350 17 G/17G
17 POWDER, FOR SOLUTION ORAL 2 TIMES DAILY PRN
Qty: 238 G | Refills: 0 | Status: SHIPPED | OUTPATIENT
Start: 2025-07-12

## 2025-07-12 RX ORDER — OXYCODONE HYDROCHLORIDE 5 MG/1
5 TABLET ORAL EVERY 6 HOURS PRN
Qty: 3 TABLET | Refills: 0 | Status: SHIPPED | OUTPATIENT
Start: 2025-07-12 | End: 2025-07-18 | Stop reason: SDUPTHER

## 2025-07-12 RX ORDER — ACETAMINOPHEN 325 MG/1
975 TABLET ORAL EVERY 6 HOURS PRN
Qty: 120 TABLET | Refills: 0 | Status: SHIPPED | OUTPATIENT
Start: 2025-07-12

## 2025-07-12 RX ORDER — SERTRALINE HYDROCHLORIDE 25 MG/1
25 TABLET, FILM COATED ORAL DAILY
Qty: 30 TABLET | Refills: 2 | Status: SHIPPED | OUTPATIENT
Start: 2025-07-12

## 2025-07-12 RX ORDER — IBUPROFEN 600 MG/1
600 TABLET, FILM COATED ORAL EVERY 6 HOURS PRN
Qty: 60 TABLET | Refills: 0 | Status: SHIPPED | OUTPATIENT
Start: 2025-07-12

## 2025-07-12 RX ADMIN — IBUPROFEN 600 MG: 600 TABLET ORAL at 06:37

## 2025-07-12 RX ADMIN — OXYCODONE HYDROCHLORIDE 10 MG: 10 TABLET ORAL at 02:15

## 2025-07-12 RX ADMIN — ACETAMINOPHEN 975 MG: 325 TABLET ORAL at 12:42

## 2025-07-12 RX ADMIN — ONDANSETRON HYDROCHLORIDE 4 MG: 4 TABLET, FILM COATED ORAL at 12:42

## 2025-07-12 RX ADMIN — OXYCODONE HYDROCHLORIDE 10 MG: 10 TABLET ORAL at 10:24

## 2025-07-12 RX ADMIN — ACETAMINOPHEN 975 MG: 325 TABLET ORAL at 06:37

## 2025-07-12 RX ADMIN — IBUPROFEN 600 MG: 600 TABLET ORAL at 00:10

## 2025-07-12 RX ADMIN — ACETAMINOPHEN 975 MG: 325 TABLET ORAL at 00:10

## 2025-07-12 RX ADMIN — IBUPROFEN 600 MG: 600 TABLET ORAL at 12:42

## 2025-07-12 ASSESSMENT — PAIN SCALES - GENERAL
PAINLEVEL_OUTOF10: 9
PAINLEVEL_OUTOF10: 7
PAINLEVEL_OUTOF10: 5 - MODERATE PAIN

## 2025-07-12 ASSESSMENT — PAIN DESCRIPTION - DESCRIPTORS: DESCRIPTORS: CRAMPING

## 2025-07-12 NOTE — LACTATION NOTE
Lactation Consultant Note  Lactation Consultation       Maternal Information       Maternal Assessment       Infant Assessment       Feeding Assessment       LATCH TOOL       Breast Pump       Other OB Lactation Tools       Patient Follow-up       Other OB Lactation Documentation       Recommendations/Summary  Mother and infant were sleeping when I rounded on her this morning, I asked mother to call with infant's next feeding for latch check and mother verbalized she will.

## 2025-07-12 NOTE — DISCHARGE SUMMARY
Discharge Summary    Jewel Rodriguez is a 35 y.o. year old female , who delivered at 37w2d gestation via Vaginal, Spontaneous, now PPD#2. Delivery c/b 4 cm cervical laceration s/p repair in OR,  mL.    Admission Date: 7/10/2025  Discharge Date: 25       Discharge Diagnosis  Vaginal, Spontaneous    Hospital Course  Delivery Date: 7/10/2025 2:53 PM  Delivery type: Vaginal, Spontaneous   GA at delivery: 37w2d   Outcome: Living  Intrapartum complications: Other Excessive Bleeding  Feeding method: Breastfeeding Status: Yes     Procedures: none  Contraception at discharge: Defers to PPV, interested in tubal sterilization- consent signed . We discussed pregnancy spacing of at least one year, abstaining from intercourse for 6wks, and the ability to become pregnant in the absence of regular menses. Pt verbalized understanding.    Post-operative pain  - Counseled patient on pain expectations, added flexeril as second line agent for pain as needed, sent Oxycodone x 3 tablets for last line for severe pain  - ERAD referral for cervical laceration      Anxiety, panic attacks  - Counseled on mediation, patient opts to start sertraline with PRN atarax  - Zoloft 25 mg daily x7 days-> increase dose to 50 mg daily  - Reviewed warning signs  - For follow-up with Dr. Rondon postpartum    Achalasia s/p repair  - Established with Dr. Bansal  - long history of achalasia since childhood requiring multiple surgeries. Most recently in 3/2024 she had a para-conduit hiatal hernia repair without mesh, redo laparotomy and lysis of adhesions following admission for nausea and vomiting   - For postpartum CXR per Dr. Villavicencio    Meeting all postpartum milestones. OK for DC today and follow up as below.   - Follow-up in 4-6wks with primary OGYN    Pertinent Physical Exam At Time of Discharge    General: Examination reveals a well developed, well nourished, female, in no acute distress. She is alert and cooperative.  Lungs:  Wound Care Procedures 11/28/2018:  Nonselective debridement with NS and gauze to remove biofilm from left posteromedial LE wound.   "symmetrical, non-labored breathing.  Cardiac: warm, well-perfused.  Abdomen: soft, non-tender.  Fundus: firm, below umbilicus, and nontender.  Extremities: no redness or tenderness in the calves or thighs.  Neurological: alert, oriented, normal speech, no focal findings or movement disorder noted.     Vitals  /74   Pulse 78   Temp 36.6 °C (97.9 °F) (Temporal)   Resp 17   Ht 1.676 m (5' 5.98\")   Wt 68.5 kg (151 lb)   SpO2 96%   Breastfeeding Yes   BMI 24.38 kg/m²      Discharge Meds     Your medication list        START taking these medications        Instructions Last Dose Given Next Dose Due   cyclobenzaprine 5 mg tablet  Commonly known as: Flexeril      Take 1 tablet (5 mg) by mouth 3 times a day as needed for muscle spasms.       hydrOXYzine HCL 25 mg tablet  Commonly known as: Atarax      Take 1 tablet (25 mg) by mouth every 6 hours if needed for anxiety.       ibuprofen 600 mg tablet  Replaces: Children's Ibuprofen 100 mg/5 mL suspension      Take 1 tablet (600 mg) by mouth every 6 hours if needed for mild pain (1 - 3) or moderate pain (4 - 6).       naloxone 4 mg/0.1 mL nasal spray  Commonly known as: Narcan      Administer 1 spray (4 mg) into affected nostril(s) if needed for opioid reversal or respiratory depression. May repeat every 2-3 minutes if needed, alternating nostrils, until medical assistance becomes available.       oxyCODONE 5 mg immediate release tablet  Commonly known as: Roxicodone      Take 1 tablet (5 mg) by mouth every 6 hours if needed for severe pain (7 - 10).       polyethylene glycol 17 gram/dose powder  Commonly known as: Glycolax, Miralax      Mix 17 g of powder and drink 2 times a day as needed for constipation.              CHANGE how you take these medications        Instructions Last Dose Given Next Dose Due   acetaminophen 325 mg tablet  Commonly known as: Tylenol  What changed:   how much to take  reasons to take this      Take 3 tablets (975 mg) by mouth every 6 " hours if needed for mild pain (1 - 3) or moderate pain (4 - 6).       sertraline 25 mg tablet  Commonly known as: Zoloft  What changed:   medication strength  how much to take  additional instructions      Take 1 tablet (25 mg) by mouth once daily. Increase dose to 50 mg (2 tablets) after 1 week of use              CONTINUE taking these medications        Instructions Last Dose Given Next Dose Due   docusate sodium 100 mg capsule  Commonly known as: Colace      Take 1 capsule (100 mg) by mouth 2 times a day as needed for constipation.       famotidine 20 mg tablet  Commonly known as: Pepcid      Take 1 tablet (20 mg) by mouth 2 times a day.       ferrous sulfate 325 mg (65 mg elemental) tablet  Commonly known as: FeosoL      Take 1 tablet every other day on an empty stomach, preferably with orange juice or something acidic to improve absorption.       lidocaine 4 % patch      Place 1 patch over 12 hours on the skin once daily. Remove & discard patch within 12 hours or as directed by MD.       Prenatal 28 mg iron- 800 mcg tablet  Generic drug: prenatal vitamin (iron-folic)                  STOP taking these medications      Children's Ibuprofen 100 mg/5 mL suspension  Generic drug: ibuprofen  Replaced by: ibuprofen 600 mg tablet        Senexon-S 8.6-50 mg tablet  Generic drug: sennosides-docusate sodium                  Where to Get Your Medications        These medications were sent to [id number] - [place] [address] [address] [facility] [address]  [facility] [facility] [address]      Phone: [phone]   acetaminophen 325 mg tablet  cyclobenzaprine 5 mg tablet  hydrOXYzine HCL 25 mg tablet  ibuprofen 600 mg tablet  lidocaine 4 % patch  naloxone 4 mg/0.1 mL nasal spray  oxyCODONE 5 mg immediate release tablet  polyethylene glycol 17 gram/dose powder  sertraline 25 mg tablet          Complications Requiring Follow-Up  Anxiety, panic attacks  Cervical laceration    Test Results Pending  At Discharge  Pending Labs       Order Current Status    Surgical Pathology Exam - [facility] In process            Outpatient Follow-Up    I spent 35 minutes in the professional and overall care of this patient.      Joy Rivera, APRN-CNP

## 2025-07-12 NOTE — CONSULTS
On call SW spoke to pt she has hx of anxiety and is agreeable to starting Zoloft again. Pt has a therapist that she can reach out if needed. Pt declined referrals to other mental health supports.   PLAN: Updated RN  Susu Corado, RUSSELL CUEVA

## 2025-07-12 NOTE — CARE PLAN
The patient's goals for the shift include rest    The clinical goals for the shift include light to scant lochia      Problem: Postpartum  Goal: Experiences normal postpartum course  Outcome: Met  Goal: Appropriate maternal -  bonding  Outcome: Met  Goal: Establish and maintain infant feeding pattern for adequate nutrition  Outcome: Met     Problem: Safety - Adult  Goal: Free from fall injury  Outcome: Met   Date and Time: 2025 10:42AM  Nursing Note/Discharge:   D: According to plan, patient discharged to home with baby.   Instructions printed and reviewed, see Discharge Instructions sheet.  Teaching provided on new medications, self care, signs and symptoms to report, PI sheets, and follow-up appointment.  Patient verbalized understanding and denies any questions at time of discharge.  Patient instructed to call MD/LIP with any additional questions after discharge.    E: Discharge teaching complete and patient is prepared for discharge.   P: Patient sent home with written and verbal instructions via transport in stable condition.  Signature: Chloé Newell RN

## 2025-07-12 NOTE — CARE PLAN
The patient's goals for the shift include rest and maintain infant feeding pattern    The clinical goals for the shift include stable vitals and adequate pain control    Patient is progressing through goals. Pain is adequately controlled on PO meds, vitals are stable, and lochia is light.       Problem: Postpartum  Goal: Experiences normal postpartum course  Outcome: Progressing     Problem: Postpartum  Goal: Appropriate maternal -  bonding  Outcome: Progressing     Problem: Postpartum  Goal: Establish and maintain infant feeding pattern for adequate nutrition  Outcome: Progressing     Problem: Postpartum  Goal: No s/sx of hemorrhage  Outcome: Progressing     Problem: Postpartum  Goal: Minimal s/sx of HDP and BP<160/110  Outcome: Progressing

## 2025-07-13 LAB
BLOOD EXPIRATION DATE: NORMAL
BLOOD EXPIRATION DATE: NORMAL
DISPENSE STATUS: NORMAL
DISPENSE STATUS: NORMAL
PRODUCT BLOOD TYPE: 5100
PRODUCT BLOOD TYPE: 5100
PRODUCT CODE: NORMAL
PRODUCT CODE: NORMAL
UNIT ABO: NORMAL
UNIT ABO: NORMAL
UNIT NUMBER: NORMAL
UNIT NUMBER: NORMAL
UNIT RH: NORMAL
UNIT RH: NORMAL
UNIT VOLUME: 350
UNIT VOLUME: 350
XM INTEP: NORMAL
XM INTEP: NORMAL

## 2025-07-14 ENCOUNTER — APPOINTMENT (OUTPATIENT)
Dept: MATERNAL FETAL MEDICINE | Facility: HOSPITAL | Age: 35
End: 2025-07-14
Payer: MEDICAID

## 2025-07-15 ENCOUNTER — DOCUMENTATION (OUTPATIENT)
Dept: OBSTETRICS AND GYNECOLOGY | Facility: HOSPITAL | Age: 35
End: 2025-07-15
Payer: MEDICAID

## 2025-07-15 NOTE — PROGRESS NOTES
RN faxed document to [facility] for breast pump. Document submitted to be scanned to the patient's chart.  GUEVARA Barcenas

## 2025-07-15 NOTE — PROGRESS NOTES
RN faxed document to [facility] for FMLA. Document submitted to be scanned to the patient's chart.  GUEVARA Barcenas

## 2025-07-16 LAB
LABORATORY COMMENT REPORT: NORMAL
PATH REPORT.FINAL DX SPEC: NORMAL
PATH REPORT.GROSS SPEC: NORMAL
PATH REPORT.RELEVANT HX SPEC: NORMAL
PATH REPORT.TOTAL CANCER: NORMAL

## 2025-07-18 DIAGNOSIS — S37.63XA CERVICAL LACERATION, INITIAL ENCOUNTER: ICD-10-CM

## 2025-07-18 RX ORDER — OXYCODONE HYDROCHLORIDE 5 MG/1
5 TABLET ORAL EVERY 6 HOURS PRN
Qty: 3 TABLET | Refills: 0 | Status: SHIPPED | OUTPATIENT
Start: 2025-07-18 | End: 2025-07-21

## 2025-08-18 ENCOUNTER — OFFICE VISIT (OUTPATIENT)
Dept: MATERNAL FETAL MEDICINE | Facility: HOSPITAL | Age: 35
End: 2025-08-18
Payer: MEDICAID

## 2025-08-18 VITALS — SYSTOLIC BLOOD PRESSURE: 120 MMHG | WEIGHT: 143 LBS | DIASTOLIC BLOOD PRESSURE: 79 MMHG | BODY MASS INDEX: 23.09 KG/M2

## 2025-08-18 DIAGNOSIS — M54.50 CHRONIC MIDLINE LOW BACK PAIN WITHOUT SCIATICA: ICD-10-CM

## 2025-08-18 DIAGNOSIS — Z90.49 HISTORY OF ESOPHAGECTOMY: ICD-10-CM

## 2025-08-18 DIAGNOSIS — B37.31 YEAST INFECTION INVOLVING THE VAGINA AND SURROUNDING AREA: ICD-10-CM

## 2025-08-18 DIAGNOSIS — Z98.890 HISTORY OF ESOPHAGECTOMY: ICD-10-CM

## 2025-08-18 DIAGNOSIS — G89.29 CHRONIC MIDLINE LOW BACK PAIN WITHOUT SCIATICA: ICD-10-CM

## 2025-08-18 DIAGNOSIS — S37.63XD CERVICAL LACERATION, SUBSEQUENT ENCOUNTER: ICD-10-CM

## 2025-08-18 PROCEDURE — 1036F TOBACCO NON-USER: CPT | Performed by: STUDENT IN AN ORGANIZED HEALTH CARE EDUCATION/TRAINING PROGRAM

## 2025-08-18 PROCEDURE — 99212 OFFICE O/P EST SF 10 MIN: CPT

## 2025-08-18 RX ORDER — FLUCONAZOLE 150 MG/1
150 TABLET ORAL ONCE
Qty: 1 TABLET | Refills: 0 | Status: SHIPPED | OUTPATIENT
Start: 2025-08-18 | End: 2025-08-18

## 2025-08-18 SDOH — ECONOMIC STABILITY: FOOD INSECURITY: WITHIN THE PAST 12 MONTHS, YOU WORRIED THAT YOUR FOOD WOULD RUN OUT BEFORE YOU GOT MONEY TO BUY MORE.: NEVER TRUE

## 2025-08-18 SDOH — HEALTH STABILITY: PHYSICAL HEALTH: ON AVERAGE, HOW MANY DAYS PER WEEK DO YOU ENGAGE IN MODERATE TO STRENUOUS EXERCISE (LIKE A BRISK WALK)?: 7 DAYS

## 2025-08-18 SDOH — ECONOMIC STABILITY: FOOD INSECURITY: WITHIN THE PAST 12 MONTHS, THE FOOD YOU BOUGHT JUST DIDN'T LAST AND YOU DIDN'T HAVE MONEY TO GET MORE.: NEVER TRUE

## 2025-08-18 SDOH — HEALTH STABILITY: PHYSICAL HEALTH: ON AVERAGE, HOW MANY MINUTES DO YOU ENGAGE IN EXERCISE AT THIS LEVEL?: 30 MIN

## 2025-08-18 ASSESSMENT — PATIENT HEALTH QUESTIONNAIRE - PHQ9
SUM OF ALL RESPONSES TO PHQ9 QUESTIONS 1 & 2: 0
2. FEELING DOWN, DEPRESSED OR HOPELESS: NOT AT ALL
1. LITTLE INTEREST OR PLEASURE IN DOING THINGS: NOT AT ALL

## 2025-08-18 ASSESSMENT — EDINBURGH POSTNATAL DEPRESSION SCALE (EPDS)
I HAVE BEEN SO UNHAPPY THAT I HAVE HAD DIFFICULTY SLEEPING: NOT AT ALL
I HAVE FELT SAD OR MISERABLE: NO, NOT AT ALL
I HAVE BEEN ANXIOUS OR WORRIED FOR NO GOOD REASON: YES, SOMETIMES
I HAVE BLAMED MYSELF UNNECESSARILY WHEN THINGS WENT WRONG: NO, NEVER
TOTAL SCORE: 3
THE THOUGHT OF HARMING MYSELF HAS OCCURRED TO ME: NEVER
I HAVE BEEN SO UNHAPPY THAT I HAVE BEEN CRYING: NO, NEVER
I HAVE BEEN ABLE TO LAUGH AND SEE THE FUNNY SIDE OF THINGS: AS MUCH AS I ALWAYS COULD
I HAVE LOOKED FORWARD WITH ENJOYMENT TO THINGS: AS MUCH AS I EVER DID
THINGS HAVE BEEN GETTING ON TOP OF ME: NO, MOST OF THE TIME I HAVE COPED QUITE WELL
I HAVE FELT SCARED OR PANICKY FOR NO GOOD REASON: NO, NOT AT ALL

## 2025-08-18 ASSESSMENT — SOCIAL DETERMINANTS OF HEALTH (SDOH)
WITHIN THE LAST YEAR, HAVE TO BEEN RAPED OR FORCED TO HAVE ANY KIND OF SEXUAL ACTIVITY BY YOUR PARTNER OR EX-PARTNER?: NO
WITHIN THE LAST YEAR, HAVE YOU BEEN HUMILIATED OR EMOTIONALLY ABUSED IN OTHER WAYS BY YOUR PARTNER OR EX-PARTNER?: NO
HOW HARD IS IT FOR YOU TO PAY FOR THE VERY BASICS LIKE FOOD, HOUSING, MEDICAL CARE, AND HEATING?: NOT HARD AT ALL
WITHIN THE LAST YEAR, HAVE YOU BEEN AFRAID OF YOUR PARTNER OR EX-PARTNER?: NO
WITHIN THE LAST YEAR, HAVE YOU BEEN KICKED, HIT, SLAPPED, OR OTHERWISE PHYSICALLY HURT BY YOUR PARTNER OR EX-PARTNER?: NO

## 2025-08-18 ASSESSMENT — PAIN SCALES - GENERAL: PAINLEVEL_OUTOF10: 8

## 2025-08-18 ASSESSMENT — LIFESTYLE VARIABLES
HOW OFTEN DO YOU HAVE SIX OR MORE DRINKS ON ONE OCCASION: NEVER
SKIP TO QUESTIONS 9-10: 1
AUDIT-C TOTAL SCORE: 0
HOW MANY STANDARD DRINKS CONTAINING ALCOHOL DO YOU HAVE ON A TYPICAL DAY: PATIENT DOES NOT DRINK
HOW OFTEN DO YOU HAVE A DRINK CONTAINING ALCOHOL: NEVER

## (undated) DEVICE — TIP, SUCTION, YANKAUER, BULB, ADULT

## (undated) DEVICE — LIGASURE, MARYLAND JAW, OPEN DELIVERY

## (undated) DEVICE — TIP,  ELECTRODE COATED INSULATED, EXTENDED, LF

## (undated) DEVICE — COLLECTION UNIT, DRAINAGE, THORACIC, SINGLE TUBE, DRY SUCTION, ATS COMPATIBLE, OASIS 3600, LF

## (undated) DEVICE — DRAPE, INCISE, ANTIMICROBIAL, IOBAN 2, LARGE, 17 X 23 IN, DISPOSABLE, STERILE

## (undated) DEVICE — CATHETER TRAY, SURESTEP, 16FR, URINE METER W/STATLOCK

## (undated) DEVICE — COVER, TABLE, UHC

## (undated) DEVICE — DRAPE, SHEET, ENDOSCOPY, GENERAL, FENESTRATED, ARMBOARD COVER, 98 X 123.5 IN, DISPOSABLE, LF, STERILE

## (undated) DEVICE — Device

## (undated) DEVICE — ADAPTER, WATER BOTTLE, SMART CAP, 140/160/180 SERIES

## (undated) DEVICE — ELECTRODE, ELECTROSURGICAL, BLADE, INSULATED, ENT/IMA, STERILE

## (undated) DEVICE — CATHETER, THORACIC, STRAIGHT, ADULT, 28 FR, PVC

## (undated) DEVICE — DRAIN, CHANNEL, BLAKE, HUBLESS, ROUND, 28FR

## (undated) DEVICE — MANIFOLD, 4 PORT NEPTUNE STANDARD

## (undated) DEVICE — COVER, CART, 45 X 27 X 48 IN, CLEAR